# Patient Record
Sex: FEMALE | Race: WHITE | URBAN - METROPOLITAN AREA
[De-identification: names, ages, dates, MRNs, and addresses within clinical notes are randomized per-mention and may not be internally consistent; named-entity substitution may affect disease eponyms.]

---

## 2017-10-01 ENCOUNTER — INPATIENT (INPATIENT)
Facility: HOSPITAL | Age: 82
LOS: 1 days | Discharge: HOME HEALTH | DRG: 641 | End: 2017-10-03
Attending: INTERNAL MEDICINE | Admitting: STUDENT IN AN ORGANIZED HEALTH CARE EDUCATION/TRAINING PROGRAM
Payer: MEDICARE

## 2017-10-01 VITALS
DIASTOLIC BLOOD PRESSURE: 79 MMHG | SYSTOLIC BLOOD PRESSURE: 146 MMHG | RESPIRATION RATE: 16 BRPM | TEMPERATURE: 98 F | OXYGEN SATURATION: 99 % | HEART RATE: 75 BPM

## 2017-10-01 DIAGNOSIS — F03.90 UNSPECIFIED DEMENTIA, UNSPECIFIED SEVERITY, WITHOUT BEHAVIORAL DISTURBANCE, PSYCHOTIC DISTURBANCE, MOOD DISTURBANCE, AND ANXIETY: ICD-10-CM

## 2017-10-01 DIAGNOSIS — F41.0 PANIC DISORDER [EPISODIC PAROXYSMAL ANXIETY]: ICD-10-CM

## 2017-10-01 DIAGNOSIS — N18.3 CHRONIC KIDNEY DISEASE, STAGE 3 (MODERATE): ICD-10-CM

## 2017-10-01 DIAGNOSIS — G89.4 CHRONIC PAIN SYNDROME: ICD-10-CM

## 2017-10-01 DIAGNOSIS — Z29.9 ENCOUNTER FOR PROPHYLACTIC MEASURES, UNSPECIFIED: ICD-10-CM

## 2017-10-01 DIAGNOSIS — M79.673 PAIN IN UNSPECIFIED FOOT: ICD-10-CM

## 2017-10-01 DIAGNOSIS — I10 ESSENTIAL (PRIMARY) HYPERTENSION: ICD-10-CM

## 2017-10-01 DIAGNOSIS — R62.7 ADULT FAILURE TO THRIVE: ICD-10-CM

## 2017-10-01 DIAGNOSIS — E86.0 DEHYDRATION: ICD-10-CM

## 2017-10-01 PROBLEM — Z00.00 ENCOUNTER FOR PREVENTIVE HEALTH EXAMINATION: Status: ACTIVE | Noted: 2017-10-01

## 2017-10-01 LAB
ALBUMIN SERPL ELPH-MCNC: 2.9 G/DL — LOW (ref 3.3–5)
ALP SERPL-CCNC: 267 U/L — HIGH (ref 40–120)
ALT FLD-CCNC: 21 U/L — SIGNIFICANT CHANGE UP (ref 12–78)
ANION GAP SERPL CALC-SCNC: 11 MMOL/L — SIGNIFICANT CHANGE UP (ref 5–17)
APPEARANCE UR: CLEAR — SIGNIFICANT CHANGE UP
AST SERPL-CCNC: 28 U/L — SIGNIFICANT CHANGE UP (ref 15–37)
BACTERIA # UR AUTO: ABNORMAL
BILIRUB SERPL-MCNC: 0.5 MG/DL — SIGNIFICANT CHANGE UP (ref 0.2–1.2)
BILIRUB UR-MCNC: NEGATIVE — SIGNIFICANT CHANGE UP
BUN SERPL-MCNC: 30 MG/DL — HIGH (ref 7–23)
CALCIUM SERPL-MCNC: 8.6 MG/DL — SIGNIFICANT CHANGE UP (ref 8.5–10.1)
CHLORIDE SERPL-SCNC: 107 MMOL/L — SIGNIFICANT CHANGE UP (ref 96–108)
CO2 SERPL-SCNC: 22 MMOL/L — SIGNIFICANT CHANGE UP (ref 22–31)
COLOR SPEC: YELLOW — SIGNIFICANT CHANGE UP
CREAT SERPL-MCNC: 1.1 MG/DL — SIGNIFICANT CHANGE UP (ref 0.5–1.3)
DIFF PNL FLD: ABNORMAL
EOSINOPHIL NFR BLD AUTO: 8 % — HIGH (ref 0–6)
EPI CELLS # UR: ABNORMAL
GLUCOSE SERPL-MCNC: 138 MG/DL — HIGH (ref 70–99)
GLUCOSE UR QL: NEGATIVE — SIGNIFICANT CHANGE UP
HCT VFR BLD CALC: 40.2 % — SIGNIFICANT CHANGE UP (ref 34.5–45)
HGB BLD-MCNC: 11.7 G/DL — SIGNIFICANT CHANGE UP (ref 11.5–15.5)
KETONES UR-MCNC: NEGATIVE — SIGNIFICANT CHANGE UP
LEUKOCYTE ESTERASE UR-ACNC: ABNORMAL
LYMPHOCYTES # BLD AUTO: 32 % — SIGNIFICANT CHANGE UP (ref 13–44)
MCHC RBC-ENTMCNC: 29 GM/DL — LOW (ref 32–36)
MCHC RBC-ENTMCNC: 30.4 PG — SIGNIFICANT CHANGE UP (ref 27–34)
MCV RBC AUTO: 105 FL — HIGH (ref 80–100)
MONOCYTES NFR BLD AUTO: 20 % — HIGH (ref 1–9)
NEUTROPHILS NFR BLD AUTO: 40 % — LOW (ref 43–77)
NITRITE UR-MCNC: NEGATIVE — SIGNIFICANT CHANGE UP
PH UR: 6 — SIGNIFICANT CHANGE UP (ref 5–8)
PLAT MORPH BLD: NORMAL — SIGNIFICANT CHANGE UP
PLATELET # BLD AUTO: 212 K/UL — SIGNIFICANT CHANGE UP (ref 150–400)
POTASSIUM SERPL-MCNC: 3.8 MMOL/L — SIGNIFICANT CHANGE UP (ref 3.5–5.3)
POTASSIUM SERPL-SCNC: 3.8 MMOL/L — SIGNIFICANT CHANGE UP (ref 3.5–5.3)
PROT SERPL-MCNC: 6.7 G/DL — SIGNIFICANT CHANGE UP (ref 6–8.3)
PROT UR-MCNC: 25 MG/DL
RBC # BLD: 3.83 M/UL — SIGNIFICANT CHANGE UP (ref 3.8–5.2)
RBC # FLD: 13.7 % — SIGNIFICANT CHANGE UP (ref 10.3–14.5)
RBC BLD AUTO: SIGNIFICANT CHANGE UP
RBC CASTS # UR COMP ASSIST: ABNORMAL /HPF (ref 0–4)
SODIUM SERPL-SCNC: 140 MMOL/L — SIGNIFICANT CHANGE UP (ref 135–145)
SP GR SPEC: 1.02 — SIGNIFICANT CHANGE UP (ref 1.01–1.02)
UROBILINOGEN FLD QL: NEGATIVE — SIGNIFICANT CHANGE UP
WBC # BLD: 4.5 K/UL — SIGNIFICANT CHANGE UP (ref 3.8–10.5)
WBC # FLD AUTO: 4.5 K/UL — SIGNIFICANT CHANGE UP (ref 3.8–10.5)
WBC UR QL: SIGNIFICANT CHANGE UP

## 2017-10-01 PROCEDURE — 93970 EXTREMITY STUDY: CPT | Mod: 26

## 2017-10-01 PROCEDURE — 70450 CT HEAD/BRAIN W/O DYE: CPT | Mod: 26

## 2017-10-01 PROCEDURE — 93010 ELECTROCARDIOGRAM REPORT: CPT

## 2017-10-01 PROCEDURE — 73630 X-RAY EXAM OF FOOT: CPT | Mod: 26,LT

## 2017-10-01 PROCEDURE — 99223 1ST HOSP IP/OBS HIGH 75: CPT | Mod: AI

## 2017-10-01 PROCEDURE — 71010: CPT | Mod: 26

## 2017-10-01 PROCEDURE — 73610 X-RAY EXAM OF ANKLE: CPT | Mod: 26,LT

## 2017-10-01 PROCEDURE — 99285 EMERGENCY DEPT VISIT HI MDM: CPT

## 2017-10-01 PROCEDURE — 99497 ADVNCD CARE PLAN 30 MIN: CPT | Mod: 25

## 2017-10-01 RX ORDER — SODIUM CHLORIDE 9 MG/ML
3 INJECTION INTRAMUSCULAR; INTRAVENOUS; SUBCUTANEOUS ONCE
Qty: 0 | Refills: 0 | Status: COMPLETED | OUTPATIENT
Start: 2017-10-01 | End: 2017-10-01

## 2017-10-01 RX ORDER — SODIUM CHLORIDE 9 MG/ML
1000 INJECTION INTRAMUSCULAR; INTRAVENOUS; SUBCUTANEOUS ONCE
Qty: 0 | Refills: 0 | Status: COMPLETED | OUTPATIENT
Start: 2017-10-01 | End: 2017-10-01

## 2017-10-01 RX ORDER — LOSARTAN POTASSIUM 100 MG/1
100 TABLET, FILM COATED ORAL DAILY
Qty: 0 | Refills: 0 | Status: DISCONTINUED | OUTPATIENT
Start: 2017-10-01 | End: 2017-10-03

## 2017-10-01 RX ORDER — METOPROLOL TARTRATE 50 MG
25 TABLET ORAL
Qty: 0 | Refills: 0 | Status: DISCONTINUED | OUTPATIENT
Start: 2017-10-01 | End: 2017-10-03

## 2017-10-01 RX ORDER — OXYCODONE AND ACETAMINOPHEN 5; 325 MG/1; MG/1
1 TABLET ORAL EVERY 6 HOURS
Qty: 0 | Refills: 0 | Status: DISCONTINUED | OUTPATIENT
Start: 2017-10-01 | End: 2017-10-01

## 2017-10-01 RX ORDER — INFLUENZA VIRUS VACCINE 15; 15; 15; 15 UG/.5ML; UG/.5ML; UG/.5ML; UG/.5ML
0.5 SUSPENSION INTRAMUSCULAR ONCE
Qty: 0 | Refills: 0 | Status: DISCONTINUED | OUTPATIENT
Start: 2017-10-01 | End: 2017-10-03

## 2017-10-01 RX ORDER — NIFEDIPINE 30 MG
10 TABLET, EXTENDED RELEASE 24 HR ORAL
Qty: 0 | Refills: 0 | Status: DISCONTINUED | OUTPATIENT
Start: 2017-10-01 | End: 2017-10-02

## 2017-10-01 RX ORDER — PANTOPRAZOLE SODIUM 20 MG/1
0 TABLET, DELAYED RELEASE ORAL
Qty: 0 | Refills: 0 | COMMUNITY

## 2017-10-01 RX ORDER — ONDANSETRON 8 MG/1
4 TABLET, FILM COATED ORAL EVERY 6 HOURS
Qty: 0 | Refills: 0 | Status: DISCONTINUED | OUTPATIENT
Start: 2017-10-01 | End: 2017-10-03

## 2017-10-01 RX ORDER — SODIUM CHLORIDE 9 MG/ML
1000 INJECTION INTRAMUSCULAR; INTRAVENOUS; SUBCUTANEOUS
Qty: 0 | Refills: 0 | Status: DISCONTINUED | OUTPATIENT
Start: 2017-10-01 | End: 2017-10-02

## 2017-10-01 RX ORDER — METOPROLOL TARTRATE 50 MG
0 TABLET ORAL
Qty: 0 | Refills: 0 | COMMUNITY

## 2017-10-01 RX ORDER — NIFEDIPINE 30 MG
0 TABLET, EXTENDED RELEASE 24 HR ORAL
Qty: 0 | Refills: 0 | COMMUNITY

## 2017-10-01 RX ORDER — OXYCODONE AND ACETAMINOPHEN 5; 325 MG/1; MG/1
0.5 TABLET ORAL EVERY 4 HOURS
Qty: 0 | Refills: 0 | Status: DISCONTINUED | OUTPATIENT
Start: 2017-10-01 | End: 2017-10-03

## 2017-10-01 RX ORDER — ALPRAZOLAM 0.25 MG
0.25 TABLET ORAL EVERY 6 HOURS
Qty: 0 | Refills: 0 | Status: DISCONTINUED | OUTPATIENT
Start: 2017-10-01 | End: 2017-10-02

## 2017-10-01 RX ORDER — LOSARTAN POTASSIUM 100 MG/1
0 TABLET, FILM COATED ORAL
Qty: 0 | Refills: 0 | COMMUNITY

## 2017-10-01 RX ORDER — PANTOPRAZOLE SODIUM 20 MG/1
40 TABLET, DELAYED RELEASE ORAL
Qty: 0 | Refills: 0 | Status: DISCONTINUED | OUTPATIENT
Start: 2017-10-01 | End: 2017-10-03

## 2017-10-01 RX ORDER — ACETAMINOPHEN 500 MG
650 TABLET ORAL EVERY 6 HOURS
Qty: 0 | Refills: 0 | Status: DISCONTINUED | OUTPATIENT
Start: 2017-10-01 | End: 2017-10-03

## 2017-10-01 RX ADMIN — Medication 25 MILLIGRAM(S): at 17:59

## 2017-10-01 RX ADMIN — SODIUM CHLORIDE 1000 MILLILITER(S): 9 INJECTION INTRAMUSCULAR; INTRAVENOUS; SUBCUTANEOUS at 17:14

## 2017-10-01 RX ADMIN — SODIUM CHLORIDE 3 MILLILITER(S): 9 INJECTION INTRAMUSCULAR; INTRAVENOUS; SUBCUTANEOUS at 14:16

## 2017-10-01 NOTE — H&P ADULT - NSHPSOCIALHISTORY_GEN_ALL_CORE
never smoker  never drinker  never recreational drug user  cannot perform any ADL's, relies on son for assistance w/ all ADL's  consumes soft diet w/ thin liquids at home

## 2017-10-01 NOTE — H&P ADULT - ASSESSMENT
89yo F w/ advanced dementia, HTN, GERD, chronic pain, anxiety w/ panic attacks presents w/ failure to thrive at home.

## 2017-10-01 NOTE — H&P ADULT - NSHPPHYSICALEXAM_GEN_ALL_CORE
T(C): 36.3 (10-01-17 @ 18:15), Max: 36.6 (10-01-17 @ 12:09)  HR: 86 (10-01-17 @ 18:15) (75 - 96)  BP: 178/74 (10-01-17 @ 18:15) (146/79 - 178/90)  RR: 17 (10-01-17 @ 18:15) (16 - 17)  SpO2: 94% (10-01-17 @ 18:15) (94% - 99%)    GENERAL: patient appears thin, pleasantly demented, unable to answer yes/no questions w/ reliable answers  EYES: sclera clear, no exudates  ENMT: oropharynx clear without erythema, no exudates, moist mucous membranes  NECK: supple, soft, no thyromegaly noted  LUNGS: good air entry bilaterally, clear to auscultation, symmetric breath sounds, no wheezing or rhonchi appreciated  HEART: distant heart sounds, soft S1/S2, regular rate  GASTROINTESTINAL: abdomen is thin and soft soft, nontender, nondistended, normoactive bowel sounds  INTEGUMENT: good skin turgor, no lesions noted, marked ecchymosis of L ankle posterior   MUSCULOSKELETAL: no clubbing or cyanosis, no obvious deformity, good AROM of L ankle without obvious discomfort  NEUROLOGIC: awake, alert, oriented to person only, moving 4 extremities, PERRL  PSYCHIATRIC: unable to assess, appears comfortable  HEME/LYMPH: no palpable supraclavicular nodules

## 2017-10-01 NOTE — H&P ADULT - ATTENDING COMMENTS
Advanced care planning was discussed with the patient and family at the bedside. At this time, they have made the informed decision to elect for do not resuscitate and do not intubate without limitations. I completed the DNR hospital form and placed EMR order.    The admission plan was discussed in detail with the patient and family members at the bedside. Son is HCP. Their questions and concerns were addressed to the best of my ability. They are in agreement with the plan as detailed above. They demonstrated adequate understanding of the counseling which I have provided. Advanced care planning was discussed with the patient and family at the bedside. At this time, they have made the informed decision to elect for do not resuscitate and do not intubate without limitations. I completed the DNR hospital form and placed EMR order. The discussion took 19 minutes.     The admission plan was discussed in detail with the patient and family members at the bedside. Son is HCP. Their questions and concerns were addressed to the best of my ability. They are in agreement with the plan as detailed above. They demonstrated adequate understanding of the counseling which I have provided.

## 2017-10-01 NOTE — H&P ADULT - PROBLEM SELECTOR PLAN 7
discussed sedative adverse effect profile w/ the son  he states that she becomes extremely agitated especially at night and he gives 0.25-0.5mg of xanax w/ good effect  he recommends continuing this regimen

## 2017-10-01 NOTE — H&P ADULT - HISTORY OF PRESENT ILLNESS
89yo F w/ advanced dementia, HTN, GERD, chronic pain, anxiety w/ panic attacks presents w/ failure to thrive at home. The pt's son is elderly and states that he cannot provide the care she requires in the home. She isn't ambulatory. She relies on him for assistance w/ all ADL's. He states that she keeps hurting herself without realizing it. He noted bruising all over her L ankle and he isn't not sure how it happened. She isn't falling because she doesn't get out of bed anymore. Other than L ankle bruises, she has no superficial signs of trauma and is in her usual pleasantly demented state. She is oriented only to person @ baseline. Pt is extremely [place] and severely demented. Unable to answer any questions. No other concerns today. The son requests rehab placement once medically cleared.     In the ED, 1L NS bolus was given    Family Hx: son states that there is no premature CAD, DM2, CVA in immediate family members

## 2017-10-01 NOTE — ED PROVIDER NOTE - PROGRESS NOTE DETAILS
All results were explained to patient and/or family and a copy of all available results given.  case brianna Emanuel

## 2017-10-01 NOTE — H&P ADULT - PROBLEM SELECTOR PLAN 6
discussed sedative effects of opiates w/ the son, he gives 1/2 tab every 3-4 hours at home due to c/o constant pain in knees and lumbar spine  opiates are high risk medications for elderly pt's but he states that she has tolerated this regimen for years and recommends continuing for now

## 2017-10-01 NOTE — ED ADULT NURSE REASSESSMENT NOTE - NS ED NURSE REASSESS COMMENT FT1
called to give report LESLI Corbett didn't know she was getting a pt , has to look pt up in computer and will call me back

## 2017-10-01 NOTE — ED ADULT NURSE NOTE - OBJECTIVE STATEMENT
pt's son states pt has decreased po intake recently. pt having more difficulty ambulating. pt diagnosed with dementia 5 weeks ago. pt with bruising to left foot and to inner thigh on left leg

## 2017-10-01 NOTE — H&P ADULT - PROBLEM SELECTOR PLAN 1
admit medicine  may required placement  gentle IVF admit medicine  may require placement  gentle IVF  check BMP in the AM to trend creatinine  PT eval  SW eval

## 2017-10-02 DIAGNOSIS — Z90.12 ACQUIRED ABSENCE OF LEFT BREAST AND NIPPLE: Chronic | ICD-10-CM

## 2017-10-02 LAB
ANION GAP SERPL CALC-SCNC: 11 MMOL/L — SIGNIFICANT CHANGE UP (ref 5–17)
BUN SERPL-MCNC: 23 MG/DL — SIGNIFICANT CHANGE UP (ref 7–23)
CALCIUM SERPL-MCNC: 8.4 MG/DL — LOW (ref 8.5–10.1)
CHLORIDE SERPL-SCNC: 107 MMOL/L — SIGNIFICANT CHANGE UP (ref 96–108)
CO2 SERPL-SCNC: 22 MMOL/L — SIGNIFICANT CHANGE UP (ref 22–31)
CREAT SERPL-MCNC: 0.84 MG/DL — SIGNIFICANT CHANGE UP (ref 0.5–1.3)
CULTURE RESULTS: SIGNIFICANT CHANGE UP
GLUCOSE SERPL-MCNC: 104 MG/DL — HIGH (ref 70–99)
HCT VFR BLD CALC: 39.6 % — SIGNIFICANT CHANGE UP (ref 34.5–45)
HGB BLD-MCNC: 12.4 G/DL — SIGNIFICANT CHANGE UP (ref 11.5–15.5)
MCHC RBC-ENTMCNC: 31.3 GM/DL — LOW (ref 32–36)
MCHC RBC-ENTMCNC: 31.6 PG — SIGNIFICANT CHANGE UP (ref 27–34)
MCV RBC AUTO: 100.9 FL — HIGH (ref 80–100)
PLATELET # BLD AUTO: 235 K/UL — SIGNIFICANT CHANGE UP (ref 150–400)
POTASSIUM SERPL-MCNC: 3.9 MMOL/L — SIGNIFICANT CHANGE UP (ref 3.5–5.3)
POTASSIUM SERPL-SCNC: 3.9 MMOL/L — SIGNIFICANT CHANGE UP (ref 3.5–5.3)
RBC # BLD: 3.92 M/UL — SIGNIFICANT CHANGE UP (ref 3.8–5.2)
RBC # FLD: 13.7 % — SIGNIFICANT CHANGE UP (ref 10.3–14.5)
SODIUM SERPL-SCNC: 140 MMOL/L — SIGNIFICANT CHANGE UP (ref 135–145)
SPECIMEN SOURCE: SIGNIFICANT CHANGE UP
WBC # BLD: 6 K/UL — SIGNIFICANT CHANGE UP (ref 3.8–10.5)
WBC # FLD AUTO: 6 K/UL — SIGNIFICANT CHANGE UP (ref 3.8–10.5)

## 2017-10-02 PROCEDURE — 99233 SBSQ HOSP IP/OBS HIGH 50: CPT | Mod: GC

## 2017-10-02 RX ORDER — ALPRAZOLAM 0.25 MG
0.5 TABLET ORAL THREE TIMES A DAY
Qty: 0 | Refills: 0 | Status: DISCONTINUED | OUTPATIENT
Start: 2017-10-02 | End: 2017-10-03

## 2017-10-02 RX ADMIN — OXYCODONE AND ACETAMINOPHEN 0.5 TABLET(S): 5; 325 TABLET ORAL at 22:31

## 2017-10-02 RX ADMIN — Medication 25 MILLIGRAM(S): at 17:45

## 2017-10-02 RX ADMIN — Medication 0.25 MILLIGRAM(S): at 11:52

## 2017-10-02 RX ADMIN — Medication 10 MILLIGRAM(S): at 05:18

## 2017-10-02 RX ADMIN — PANTOPRAZOLE SODIUM 40 MILLIGRAM(S): 20 TABLET, DELAYED RELEASE ORAL at 05:18

## 2017-10-02 RX ADMIN — Medication 0.5 MILLIGRAM(S): at 17:45

## 2017-10-02 RX ADMIN — OXYCODONE AND ACETAMINOPHEN 0.5 TABLET(S): 5; 325 TABLET ORAL at 18:45

## 2017-10-02 RX ADMIN — Medication 25 MILLIGRAM(S): at 05:18

## 2017-10-02 RX ADMIN — Medication 0.5 MILLIGRAM(S): at 23:54

## 2017-10-02 RX ADMIN — LOSARTAN POTASSIUM 100 MILLIGRAM(S): 100 TABLET, FILM COATED ORAL at 05:18

## 2017-10-02 RX ADMIN — OXYCODONE AND ACETAMINOPHEN 0.5 TABLET(S): 5; 325 TABLET ORAL at 17:45

## 2017-10-02 NOTE — PROGRESS NOTE ADULT - PROBLEM SELECTOR PLAN 1
Does not appear dry on exam, no evidence of fluid overload, pt has taking out the IV   - son requesting placement  - continue gentle IVF   - cr wnl, will continue to trend   - f/u PT eval  - f/u Social work eval Does not appear dry on exam, no evidence of fluid overload. Pulling out IV.   - son requesting placement  - continue gentle IVF   - Cr wnl, will continue to trend   - f/u PT eval  - f/u Social work eval

## 2017-10-02 NOTE — PROGRESS NOTE ADULT - PROBLEM SELECTOR PLAN 2
Advanced, chronic - pt at baseline Advanced, chronic. pt at baseline. Advanced dementia, consider psych to help with controlling patient's behavioural aspects of the dementia

## 2017-10-02 NOTE — PROGRESS NOTE ADULT - ATTENDING COMMENTS
Pt seen + examined. Case discussed with intern/resident. Agree with assessment and plan above (edited) with following addendum:  89yo F w/ advanced dementia, HTN, GERD, chronic pain, anxiety w/ panic attacks presents w/ failure to thrive at home. Admitted for dehydration.  -it seems pt with worsening dementia and behavioral disturbance making it more difficult for son to manage her at home  -work with social work for safest discharge plan -- [facility] vs SNF

## 2017-10-02 NOTE — PROGRESS NOTE ADULT - PROBLEM SELECTOR PLAN 6
Pt severely demented at baseline, unable to ascertain precise pain scale - pain management, percocet prn Severely demented at baseline, unable to ascertain precise pain scale.   - pain management with percocet prn

## 2017-10-02 NOTE — PROGRESS NOTE ADULT - PROBLEM SELECTOR PROBLEM 2
Dementia without behavioral disturbance, unspecified dementia type Dementia with behavioral disturbance

## 2017-10-02 NOTE — GOALS OF CARE CONVERSATION - PERSONAL ADVANCE DIRECTIVE - CONVERSATION DETAILS
met pt son, completed hospital dnr dni consent: further molst discussion: son agrees to dnr dni no TF, wants to continue treatment plan for pt. Dr Gaines aware, to complete molst form  contact # given

## 2017-10-02 NOTE — PROGRESS NOTE ADULT - PROBLEM SELECTOR PLAN 4
BP labile - continue Cozaar, metoprolol, procardia with hold parameters  - consider better optimization of bp  - monitor vitals Chronic, labile.   - continue Cozaar, metoprolol, procardia with hold parameters  - consider better optimization of BP

## 2017-10-02 NOTE — PROGRESS NOTE ADULT - SUBJECTIVE AND OBJECTIVE BOX
INTERVAL HPI/OVERNIGHT EVENTS: 89yo F w/ advanced dementia, HTN, GERD, chronic pain, anxiety w/ panic attacks presents w/ failure to thrive at home. The pt's son is elderly and states that he cannot provide the care she requires in the home. She isn't ambulatory. She relies on him for assistance w/ all ADL's. He states that she keeps hurting herself without realizing it. He noted bruising all over her L ankle and he isn't not sure how it happened. She isn't falling because she doesn't get out of bed anymore. Other than L ankle bruises, she has no superficial signs of trauma and is in her usual pleasantly demented state. She is oriented only to person @ baseline. Pt is extremely [place] and severely demented. Unable to answer any questions. No other concerns today. The son requests rehab placement once medically cleared.     Pt seen and examined at bedside.  Pt is combative and uncooperative. Advanced dementia at baseline, does not respond to questions but to painful stimuli.  Per nurse, pt has been ripping out her IV.    MEDICATIONS  (STANDING):  sodium chloride 0.9%. 1000 milliLiter(s) (125 mL/Hr) IV Continuous <Continuous>  losartan 100 milliGRAM(s) Oral daily  metoprolol 25 milliGRAM(s) Oral two times a day  NIFEdipine IR 10 milliGRAM(s) Oral two times a day  pantoprazole    Tablet 40 milliGRAM(s) Oral before breakfast  influenza   Vaccine 0.5 milliLiter(s) IntraMuscular once    MEDICATIONS  (PRN):  acetaminophen   Tablet. 650 milliGRAM(s) Oral every 6 hours PRN Mild Pain (1 - 3)  ondansetron Injectable 4 milliGRAM(s) IV Push every 6 hours PRN Nausea  oxyCODONE    5 mG/acetaminophen 325 mG 0.5 Tablet(s) Oral every 4 hours PRN Moderate Pain (4 - 6)  ALPRAZolam 0.25 milliGRAM(s) Oral every 6 hours PRN agitation      Allergies    No Known Allergies    Intolerances    ROS: unable to obtain due to severe dementia      Vital Signs Last 24 Hrs  T(C): 36.4 (02 Oct 2017 04:42), Max: 36.6 (01 Oct 2017 12:09)  T(F): 97.6 (02 Oct 2017 04:42), Max: 97.8 (01 Oct 2017 12:09)  HR: 98 (02 Oct 2017 04:42) (75 - 98)  BP: 164/84 (02 Oct 2017 04:42) (124/69 - 178/90)  BP(mean): --  RR: 16 (02 Oct 2017 04:42) (16 - 17)  SpO2: 98% (02 Oct 2017 04:42) (94% - 99%)    10-01 @ 07:01  -  10-02 @ 07:00  --------------------------------------------------------  IN: 1100 mL / OUT: 300 mL / NET: 800 mL      Physical Exam:  General: Frail, severe dementia, combative, does not respond to commands and questions  HEENT: NCAT, PERRLA, EOMI bl, dry mucous membranes   Neck: Supple, nontender, no mass  Neurology: A&Ox1, nonfocal  Respiratory: CTA B/L, No W/R/R  CV: RRR, +S1/S2, no murmurs, rubs or gallops  Abdominal: Soft, NT, ND, hypoactive bowel sounds  Extremities: No C/C/E, +2 peripheral pulses  MSK: no joint erythema or warmth, no joint swelling   Skin: warm, dry, normal color, minor bruise on left lower back and left ankle       LABS:                        12.4   6.0   )-----------( 235      ( 02 Oct 2017 06:35 )             39.6     10    140  |  107  |  23  ----------------------------<  104<H>  3.9   |  22  |  0.84    Ca    8.4<L>      02 Oct 2017 06:35    TPro  6.7  /  Alb  2.9<L>  /  TBili  0.5  /  DBili  x   /  AST  28  /  ALT  21  /  AlkPhos  267<H>  10-01      Urinalysis Basic - ( 01 Oct 2017 15:13 )    Color: Yellow / Appearance: Clear / S.020 / pH: x  Gluc: x / Ketone: Negative  / Bili: Negative / Urobili: Negative   Blood: x / Protein: 25 mg/dL / Nitrite: Negative   Leuk Esterase: Trace / RBC: 3-5 /HPF / WBC 3-5   Sq Epi: x / Non Sq Epi: Moderate / Bacteria: x        RADIOLOGY & ADDITIONAL TESTS: Patient is a 89y old  Female who presents with a chief complaint of dementia "dehydration" as per son (01 Oct 2017 18:17)    INTERVAL HPI/OVERNIGHT EVENTS: 89yo F w/ advanced dementia, HTN, GERD, chronic pain, anxiety w/ panic attacks presents w/ failure to thrive at home. The pt's son is elderly and states that he cannot provide the care she requires in the home. She isn't ambulatory. She relies on him for assistance w/ all ADL's. He states that she keeps hurting herself without realizing it. He noted bruising all over her L ankle and he isn't not sure how it happened. She isn't falling because she doesn't get out of bed anymore. Other than L ankle bruises, she has no superficial signs of trauma and is in her usual pleasantly demented state. She is oriented only to person @ baseline. Pt is extremely [place] and severely demented. Unable to answer any questions. No other concerns today. The son requests rehab placement once medically cleared.     Pt seen and examined at bedside.  Pt is combative and uncooperative. Advanced dementia at baseline, does not respond to questions but to painful stimuli.  Per nurse, pt has been ripping out her IV.    MEDICATIONS  (STANDING):  sodium chloride 0.9%. 1000 milliLiter(s) (125 mL/Hr) IV Continuous <Continuous>  losartan 100 milliGRAM(s) Oral daily  metoprolol 25 milliGRAM(s) Oral two times a day  NIFEdipine IR 10 milliGRAM(s) Oral two times a day  pantoprazole    Tablet 40 milliGRAM(s) Oral before breakfast  influenza   Vaccine 0.5 milliLiter(s) IntraMuscular once    MEDICATIONS  (PRN):  acetaminophen   Tablet. 650 milliGRAM(s) Oral every 6 hours PRN Mild Pain (1 - 3)  ondansetron Injectable 4 milliGRAM(s) IV Push every 6 hours PRN Nausea  oxyCODONE    5 mG/acetaminophen 325 mG 0.5 Tablet(s) Oral every 4 hours PRN Moderate Pain (4 - 6)  ALPRAZolam 0.25 milliGRAM(s) Oral every 6 hours PRN agitation      Allergies    No Known Allergies    Intolerances    ROS: unable to obtain due to severe dementia      Vital Signs Last 24 Hrs  T(C): 36.4 (02 Oct 2017 04:42), Max: 36.6 (01 Oct 2017 12:09)  T(F): 97.6 (02 Oct 2017 04:42), Max: 97.8 (01 Oct 2017 12:09)  HR: 98 (02 Oct 2017 04:42) (75 - 98)  BP: 164/84 (02 Oct 2017 04:42) (124/69 - 178/90)  BP(mean): --  RR: 16 (02 Oct 2017 04:42) (16 - 17)  SpO2: 98% (02 Oct 2017 04:42) (94% - 99%)    10-01 @ 07:01  -  10-02 @ 07:00  --------------------------------------------------------  IN: 1100 mL / OUT: 300 mL / NET: 800 mL      Physical Exam:  General: Frail, severe dementia, combative, does not respond to commands and questions  HEENT: NCAT, PERRLA, EOMI bl, dry mucous membranes   Neck: Supple, nontender, no mass  Neurology: A&Ox1, nonfocal  Respiratory: CTA B/L, No W/R/R  CV: RRR, +S1/S2, no murmurs, rubs or gallops  Abdominal: Soft, NT, ND, hypoactive bowel sounds  Extremities: No C/C/E, +2 peripheral pulses  MSK: no joint erythema or warmth, no joint swelling   Skin: warm, dry, normal color, minor bruise on left lower back and left ankle       LABS:                        12.4   6.0   )-----------( 235      ( 02 Oct 2017 06:35 )             39.6     10    140  |  107  |  23  ----------------------------<  104<H>  3.9   |  22  |  0.84    Ca    8.4<L>      02 Oct 2017 06:35    TPro  6.7  /  Alb  2.9<L>  /  TBili  0.5  /  DBili  x   /  AST  28  /  ALT  21  /  AlkPhos  267<H>  10      Urinalysis Basic - ( 01 Oct 2017 15:13 )    Color: Yellow / Appearance: Clear / S.020 / pH: x  Gluc: x / Ketone: Negative  / Bili: Negative / Urobili: Negative   Blood: x / Protein: 25 mg/dL / Nitrite: Negative   Leuk Esterase: Trace / RBC: 3-5 /HPF / WBC 3-5   Sq Epi: x / Non Sq Epi: Moderate / Bacteria: x        RADIOLOGY & ADDITIONAL TESTS:

## 2017-10-02 NOTE — PROGRESS NOTE ADULT - PROBLEM SELECTOR PLAN 3
likely stage 3-4 @ baseline  - GFR improving, cr wnl  - continue to trend  - continue ivf likely stage 3-4 @ baseline  - continue gentle hydration   - GFR improving, Cr wnl  - continue to trend

## 2017-10-02 NOTE — PROGRESS NOTE ADULT - PROBLEM SELECTOR PLAN 8
GI ppx w/ home dose protonix 40mg   DVT ppx w/ ICD's due to unknown case of bruising, potentially ?falls DVT ppx with ICD's due to unknown case of bruising, potentially ?falls  GI ppx with protonix 40mg

## 2017-10-03 ENCOUNTER — TRANSCRIPTION ENCOUNTER (OUTPATIENT)
Age: 82
End: 2017-10-03

## 2017-10-03 VITALS
RESPIRATION RATE: 16 BRPM | HEART RATE: 90 BPM | DIASTOLIC BLOOD PRESSURE: 69 MMHG | SYSTOLIC BLOOD PRESSURE: 139 MMHG | TEMPERATURE: 98 F | OXYGEN SATURATION: 95 %

## 2017-10-03 DIAGNOSIS — G30.8 OTHER ALZHEIMER'S DISEASE: ICD-10-CM

## 2017-10-03 DIAGNOSIS — F32.9 MAJOR DEPRESSIVE DISORDER, SINGLE EPISODE, UNSPECIFIED: ICD-10-CM

## 2017-10-03 DIAGNOSIS — F03.91 UNSPECIFIED DEMENTIA WITH BEHAVIORAL DISTURBANCE: ICD-10-CM

## 2017-10-03 LAB
ANION GAP SERPL CALC-SCNC: 10 MMOL/L — SIGNIFICANT CHANGE UP (ref 5–17)
BUN SERPL-MCNC: 23 MG/DL — SIGNIFICANT CHANGE UP (ref 7–23)
CALCIUM SERPL-MCNC: 9.1 MG/DL — SIGNIFICANT CHANGE UP (ref 8.5–10.1)
CHLORIDE SERPL-SCNC: 104 MMOL/L — SIGNIFICANT CHANGE UP (ref 96–108)
CO2 SERPL-SCNC: 24 MMOL/L — SIGNIFICANT CHANGE UP (ref 22–31)
CREAT SERPL-MCNC: 1.1 MG/DL — SIGNIFICANT CHANGE UP (ref 0.5–1.3)
GLUCOSE SERPL-MCNC: 81 MG/DL — SIGNIFICANT CHANGE UP (ref 70–99)
HCT VFR BLD CALC: 40.6 % — SIGNIFICANT CHANGE UP (ref 34.5–45)
HGB BLD-MCNC: 12.1 G/DL — SIGNIFICANT CHANGE UP (ref 11.5–15.5)
MCHC RBC-ENTMCNC: 29.7 GM/DL — LOW (ref 32–36)
MCHC RBC-ENTMCNC: 31.1 PG — SIGNIFICANT CHANGE UP (ref 27–34)
MCV RBC AUTO: 104.7 FL — HIGH (ref 80–100)
PLATELET # BLD AUTO: 237 K/UL — SIGNIFICANT CHANGE UP (ref 150–400)
POTASSIUM SERPL-MCNC: 4.2 MMOL/L — SIGNIFICANT CHANGE UP (ref 3.5–5.3)
POTASSIUM SERPL-SCNC: 4.2 MMOL/L — SIGNIFICANT CHANGE UP (ref 3.5–5.3)
RBC # BLD: 3.88 M/UL — SIGNIFICANT CHANGE UP (ref 3.8–5.2)
RBC # FLD: 14.2 % — SIGNIFICANT CHANGE UP (ref 10.3–14.5)
SODIUM SERPL-SCNC: 138 MMOL/L — SIGNIFICANT CHANGE UP (ref 135–145)
WBC # BLD: 7 K/UL — SIGNIFICANT CHANGE UP (ref 3.8–10.5)
WBC # FLD AUTO: 7 K/UL — SIGNIFICANT CHANGE UP (ref 3.8–10.5)

## 2017-10-03 PROCEDURE — 99239 HOSP IP/OBS DSCHRG MGMT >30: CPT

## 2017-10-03 RX ORDER — NIFEDIPINE 30 MG
0 TABLET, EXTENDED RELEASE 24 HR ORAL
Qty: 0 | Refills: 0 | COMMUNITY

## 2017-10-03 RX ORDER — ALPRAZOLAM 0.25 MG
1 TABLET ORAL
Qty: 0 | Refills: 0 | COMMUNITY

## 2017-10-03 RX ORDER — ALPRAZOLAM 0.25 MG
1 TABLET ORAL
Qty: 0 | Refills: 0 | COMMUNITY
Start: 2017-10-03

## 2017-10-03 RX ADMIN — LOSARTAN POTASSIUM 100 MILLIGRAM(S): 100 TABLET, FILM COATED ORAL at 05:42

## 2017-10-03 RX ADMIN — PANTOPRAZOLE SODIUM 40 MILLIGRAM(S): 20 TABLET, DELAYED RELEASE ORAL at 05:42

## 2017-10-03 RX ADMIN — OXYCODONE AND ACETAMINOPHEN 0.5 TABLET(S): 5; 325 TABLET ORAL at 00:27

## 2017-10-03 RX ADMIN — Medication 25 MILLIGRAM(S): at 05:42

## 2017-10-03 NOTE — PROGRESS NOTE ADULT - PROBLEM SELECTOR PLAN 1
Does not appear dry on exam, no evidence of fluid overload. Off IVF  - encourage PO intake  - Cr wnl, will continue to trend   - per PT, bed mobility training; transfer training; gait training on placement  - f/u Social work eval, [facility] vs SNF Does not appear dry on exam, no evidence of fluid overload. Off IVF  - encourage PO fluid intake  - f/u Social work eval, [facility] vs SNF  - PT recommends home PT

## 2017-10-03 NOTE — DISCHARGE NOTE ADULT - MEDICATION SUMMARY - MEDICATIONS TO STOP TAKING
I will STOP taking the medications listed below when I get home from the hospital:  None I will STOP taking the medications listed below when I get home from the hospital:    Procardia 10 mg oral capsule  -- orally 2 times a day

## 2017-10-03 NOTE — PROGRESS NOTE ADULT - ASSESSMENT
89yo F w/ advanced dementia, HTN, GERD, chronic pain, anxiety w/ panic attacks presents w/ failure to thrive at home. Admitted for dehydration.
89yo F w/ advanced dementia, HTN, GERD, chronic pain, anxiety w/ panic attacks presents w/ failure to thrive at home admitted for dehydration and FTT at home.

## 2017-10-03 NOTE — DISCHARGE NOTE ADULT - CARE PLAN
Principal Discharge DX:	Dehydration  Goal:	Resolution  Instructions for follow-up, activity and diet:	Resolved. Encourage fluid intake.  Secondary Diagnosis:	Alzheimer's dementia with behavioral disturbance, unspecified timing of dementia onset  Instructions for follow-up, activity and diet:	Continue taking  Secondary Diagnosis:	Essential hypertension  Instructions for follow-up, activity and diet:	Continue taking cozaar and metoprolol  Secondary Diagnosis:	CKD (chronic kidney disease) stage 3, GFR 30-59 ml/min  Secondary Diagnosis:	Chronic pain syndrome  Instructions for follow-up, activity and diet:	Continue taking vicodin.  Secondary Diagnosis:	Pain of foot, unspecified laterality  Instructions for follow-up, activity and diet:	X rays negative for acute fractures.   Continue to monitor for changes. Principal Discharge DX:	Dehydration  Goal:	Resolution  Instructions for follow-up, activity and diet:	Resolved. Encourage fluid intake.  Secondary Diagnosis:	Alzheimer's dementia with behavioral disturbance, unspecified timing of dementia onset  Secondary Diagnosis:	Essential hypertension  Goal:	BP control  Instructions for follow-up, activity and diet:	Continue taking cozaar, metoprolol, procardia  Secondary Diagnosis:	CKD (chronic kidney disease) stage 3, GFR 30-59 ml/min  Secondary Diagnosis:	Chronic pain syndrome  Goal:	Pain control  Instructions for follow-up, activity and diet:	Continue taking home meds, Vicodin  Secondary Diagnosis:	Pain of foot, unspecified laterality  Instructions for follow-up, activity and diet:	X rays negative for acute fractures.   Continue to monitor for changes.  Secondary Diagnosis:	Panic anxiety syndrome  Goal:	Improving  Instructions for follow-up, activity and diet:	Continue xanax Principal Discharge DX:	Dehydration  Goal:	Resolution  Instructions for follow-up, activity and diet:	Resolved. Encourage fluid intake.  Secondary Diagnosis:	Alzheimer's dementia with behavioral disturbance, unspecified timing of dementia onset  Instructions for follow-up, activity and diet:	Continue taking parnate and xanax.  Secondary Diagnosis:	Essential hypertension  Goal:	BP control  Instructions for follow-up, activity and diet:	Stop taking procardia. Continue taking cozaar, metoprolol.  Secondary Diagnosis:	CKD (chronic kidney disease) stage 3, GFR 30-59 ml/min  Instructions for follow-up, activity and diet:	Encourage fluid intake.  Secondary Diagnosis:	Chronic pain syndrome  Goal:	Pain control  Instructions for follow-up, activity and diet:	Continue taking Vicodin.  Secondary Diagnosis:	Pain of foot, unspecified laterality  Instructions for follow-up, activity and diet:	X rays negative for acute fractures.   Continue to monitor for changes.  Secondary Diagnosis:	Panic anxiety syndrome  Goal:	Improving  Instructions for follow-up, activity and diet:	Continue taking xanax. Principal Discharge DX:	Dehydration  Goal:	Resolution  Instructions for follow-up, activity and diet:	Resolved. Encourage fluid intake by mouth.  Secondary Diagnosis:	Alzheimer's dementia with behavioral disturbance, unspecified timing of dementia onset  Instructions for follow-up, activity and diet:	Continue taking parnate and xanax. Follow up with PMD / psychiatrist.  Secondary Diagnosis:	Essential hypertension  Instructions for follow-up, activity and diet:	Stop taking procardia (this has been shown to cause significant drop in blood pressure for some patients). Continue taking cozaar, metoprolol. Monitor the blood pressure and make a daily log of blood pressure readings to show to PMD, to see if blood pressure adequately controlled without the procardia.  Secondary Diagnosis:	CKD (chronic kidney disease) stage 3, GFR 30-59 ml/min  Instructions for follow-up, activity and diet:	Encourage fluid intake. Follow up with PMD.  Secondary Diagnosis:	Chronic pain syndrome  Goal:	Pain control  Instructions for follow-up, activity and diet:	Continue taking Vicodin.  Secondary Diagnosis:	Pain of foot, unspecified laterality  Instructions for follow-up, activity and diet:	X rays negative for acute fractures.   Continue to monitor for changes.  Secondary Diagnosis:	Panic anxiety syndrome  Instructions for follow-up, activity and diet:	Continue taking xanax. Principal Discharge DX:	Dehydration  Goal:	Resolution  Instructions for follow-up, activity and diet:	Resolved. Encourage fluid intake by mouth.  Secondary Diagnosis:	Alzheimer's dementia with behavioral disturbance, unspecified timing of dementia onset  Instructions for follow-up, activity and diet:	Continue taking parnate and xanax. Follow up with PMD / psychiatrist.  Secondary Diagnosis:	Essential hypertension  Instructions for follow-up, activity and diet:	Stop taking procardia (this has been shown to cause significant drop in blood pressure for some patients). Continue taking cozaar, metoprolol. Monitor the blood pressure and make a daily log of blood pressure readings to show to PMD, to see if blood pressure adequately controlled without the procardia.  Secondary Diagnosis:	CKD (chronic kidney disease) stage 3, GFR 30-59 ml/min  Instructions for follow-up, activity and diet:	Encourage fluid intake. Follow up with PMD.  Secondary Diagnosis:	Chronic pain syndrome  Goal:	Pain control  Instructions for follow-up, activity and diet:	Continue taking Vicodin.  Secondary Diagnosis:	Pain of foot, unspecified laterality  Instructions for follow-up, activity and diet:	X rays negative for acute fractures.   You may use Tylenol for pain control.  Secondary Diagnosis:	Panic anxiety syndrome  Instructions for follow-up, activity and diet:	Continue taking xanax and follow up with your PMD / psychiatrist.

## 2017-10-03 NOTE — PROGRESS NOTE ADULT - SUBJECTIVE AND OBJECTIVE BOX
Patient is a 89y old  Female who presents with a chief complaint of dementia "dehydration" as per son (01 Oct 2017 18:17)    INTERVAL HPI/OVERNIGHT EVENTS: 89yo F w/ advanced dementia, HTN, GERD, chronic pain, anxiety w/ panic attacks presents w/ failure to thrive at home. The pt's son is elderly and states that he cannot provide the care she requires in the home. She isn't ambulatory. She relies on him for assistance w/ all ADL's. He states that she keeps hurting herself without realizing it. He noted bruising all over her L ankle and he isn't not sure how it happened. She isn't falling because she doesn't get out of bed anymore. Other than L ankle bruises, she has no superficial signs of trauma and is in her usual pleasantly demented state. She is oriented only to person @ baseline. Pt is extremely [place] and severely demented. Unable to answer any questions. No other concerns today. The son requests rehab placement once medically cleared.     Pt seen and examined at bedside sleeping comfortably.  Remains uncooperative with exam and does not respond to questions properly likely due to advanced dementia at baseline.  Improved from yesterday.    MEDICATIONS  (STANDING):  influenza   Vaccine 0.5 milliLiter(s) IntraMuscular once  losartan 100 milliGRAM(s) Oral daily  metoprolol 25 milliGRAM(s) Oral two times a day  pantoprazole    Tablet 40 milliGRAM(s) Oral before breakfast    MEDICATIONS  (PRN):  acetaminophen   Tablet. 650 milliGRAM(s) Oral every 6 hours PRN Mild Pain (1 - 3)  ALPRAZolam 0.5 milliGRAM(s) Oral three times a day PRN Aniexty/insomia  ondansetron Injectable 4 milliGRAM(s) IV Push every 6 hours PRN Nausea  oxyCODONE    5 mG/acetaminophen 325 mG 0.5 Tablet(s) Oral every 4 hours PRN Moderate Pain (4 - 6)      Allergies    No Known Allergies    Intolerances      ROS: unable to obtain due to severe dementia    Vital Signs Last 24 Hrs  T(C): 36.6 (03 Oct 2017 06:43), Max: 36.9 (02 Oct 2017 12:50)  T(F): 97.8 (03 Oct 2017 06:43), Max: 98.4 (02 Oct 2017 12:50)  HR: 90 (03 Oct 2017 06:43) (88 - 91)  BP: 139/69 (03 Oct 2017 06:43) (134/73 - 151/68)  BP(mean): --  RR: 16 (03 Oct 2017 06:43) (16 - 17)  SpO2: 95% (03 Oct 2017 06:43) (95% - 99%)    10-02 @ 07:01  -  10-03 @ 07:00  --------------------------------------------------------  IN: 250 mL / OUT: 0 mL / NET: 250 mL      Physical Exam:  General: Frail, severe dementia, responds to stimuli and pain  HEENT: NCAT, PERRLA, EOMI bl, dry mucous membranes   Neck: Supple, nontender, no mass  Neurology: A&Ox1, nonfocal  Respiratory: CTA B/L, No W/R/R  CV: RRR, +S1/S2, no murmurs, rubs or gallops  Abdominal: Soft, NT, ND, hypoactive bowel sounds  Extremities: No C/C/E, +2 peripheral pulses  MSK: no joint erythema or warmth, no joint swelling   Skin: warm, dry, normal color, minor bruise on left lower back and left ankle     LABS:                        12.1   7.0   )-----------( 237      ( 03 Oct 2017 06:46 )             40.6     10-03    138  |  104  |  23  ----------------------------<  81  4.2   |  24  |  1.10    Ca    9.1      03 Oct 2017 06:46    TPro  6.7  /  Alb  2.9<L>  /  TBili  0.5  /  DBili  x   /  AST  28  /  ALT  21  /  AlkPhos  267<H>  10-      Urinalysis Basic - ( 01 Oct 2017 15:13 )    Color: Yellow / Appearance: Clear / S.020 / pH: x  Gluc: x / Ketone: Negative  / Bili: Negative / Urobili: Negative   Blood: x / Protein: 25 mg/dL / Nitrite: Negative   Leuk Esterase: Trace / RBC: 3-5 /HPF / WBC 3-5   Sq Epi: x / Non Sq Epi: Moderate / Bacteria: Occasional        RADIOLOGY & ADDITIONAL TESTS: Patient is a 89y old  Female who presents with a chief complaint of dementia "dehydration" as per son (01 Oct 2017 18:17)    INTERVAL HPI/OVERNIGHT EVENTS: 89yo F w/ advanced dementia, HTN, GERD, chronic pain, anxiety w/ panic attacks presents w/ failure to thrive at home. The pt's son is elderly and states that he cannot provide the care she requires in the home. She isn't ambulatory. She relies on him for assistance w/ all ADL's. He states that she keeps hurting herself without realizing it. He noted bruising all over her L ankle and he isn't not sure how it happened. She isn't falling because she doesn't get out of bed anymore. Other than L ankle bruises, she has no superficial signs of trauma and is in her usual pleasantly demented state. She is oriented only to person @ baseline. Pt is extremely [place] and severely demented. Unable to answer any questions. No other concerns today. The son requests rehab placement once medically cleared.     Pt seen and examined at bedside sleeping comfortably.  Remains uncooperative with exam and does not respond to questions properly likely due to advanced dementia at baseline.  Improved from yesterday.    MEDICATIONS  (STANDING):  influenza   Vaccine 0.5 milliLiter(s) IntraMuscular once  losartan 100 milliGRAM(s) Oral daily  metoprolol 25 milliGRAM(s) Oral two times a day  pantoprazole    Tablet 40 milliGRAM(s) Oral before breakfast    MEDICATIONS  (PRN):  acetaminophen   Tablet. 650 milliGRAM(s) Oral every 6 hours PRN Mild Pain (1 - 3)  ALPRAZolam 0.5 milliGRAM(s) Oral three times a day PRN Aniexty/insomia  ondansetron Injectable 4 milliGRAM(s) IV Push every 6 hours PRN Nausea  oxyCODONE    5 mG/acetaminophen 325 mG 0.5 Tablet(s) Oral every 4 hours PRN Moderate Pain (4 - 6)      Allergies    No Known Allergies    Intolerances      ROS: unable to obtain due to severe dementia    Vital Signs Last 24 Hrs  T(C): 36.6 (03 Oct 2017 06:43), Max: 36.9 (02 Oct 2017 12:50)  T(F): 97.8 (03 Oct 2017 06:43), Max: 98.4 (02 Oct 2017 12:50)  HR: 90 (03 Oct 2017 06:43) (88 - 91)  BP: 139/69 (03 Oct 2017 06:43) (134/73 - 151/68)  BP(mean): --  RR: 16 (03 Oct 2017 06:43) (16 - 17)  SpO2: 95% (03 Oct 2017 06:43) (95% - 99%)    10-02 @ 07:01  -  10-03 @ 07:00  --------------------------------------------------------  IN: 250 mL / OUT: 0 mL / NET: 250 mL      Physical Exam:  General: Frail, severe dementia, responds to stimuli and pain  HEENT: NCAT, PERRLA, EOMI bl, dry mucous membranes   Neck: Supple, nontender, no mass  Neurology: A&Ox1, nonfocal  Respiratory: CTA B/L, No W/R/R  CV: RRR, +S1/S2, no murmurs, rubs or gallops  Abdominal: Soft, NT, ND, hypoactive bowel sounds  Extremities: No C/C/E, +2 peripheral pulses  MSK: no joint erythema or warmth, no joint swelling   Skin: warm, dry, normal color, minor bruise on left lower back and left ankle     LABS:                        12.1   7.0   )-----------( 237      ( 03 Oct 2017 06:46 )             40.6     10-03    138  |  104  |  23  ----------------------------<  81  4.2   |  24  |  1.10    Ca    9.1      03 Oct 2017 06:46    TPro  6.7  /  Alb  2.9<L>  /  TBili  0.5  /  DBili  x   /  AST  28  /  ALT  21  /  AlkPhos  267<H>  10-      Urinalysis Basic - ( 01 Oct 2017 15:13 )    Color: Yellow / Appearance: Clear / S.020 / pH: x  Gluc: x / Ketone: Negative  / Bili: Negative / Urobili: Negative   Blood: x / Protein: 25 mg/dL / Nitrite: Negative   Leuk Esterase: Trace / RBC: 3-5 /HPF / WBC 3-5   Sq Epi: x / Non Sq Epi: Moderate / Bacteria: Occasional    Culture - Urine (10.01.17 @ 18:37)    Specimen Source: .Urine Catheterized    Culture Results:   <10,000 CFU/ml  Normal Urogenital elizabeth present      RADIOLOGY & ADDITIONAL TESTS:

## 2017-10-03 NOTE — DISCHARGE NOTE ADULT - HOSPITAL COURSE
89yo F w/ advanced dementia, HTN, GERD, chronic pain, anxiety w/ panic attacks presents w/ failure to thrive at home. The pt's son is elderly and states that he cannot provide the care she requires in the home. She isn't ambulatory. She relies on him for assistance w/ all ADL's. He states that she keeps hurting herself without realizing it. He noted bruising all over her L ankle and he isn't not sure how it happened. She isn't falling because she doesn't get out of bed anymore. Other than L ankle bruises, she has no superficial signs of trauma and is in her usual pleasantly demented state. She is oriented only to person @ baseline. Pt is extremely [place] and severely demented. Unable to answer any questions. No other concerns today. The son requests rehab placement once medically cleared.     Admitted for dehydration and failure to thrive at home.  Pt severely demented at baseline, very agitated throughout hospital course. PT and Psych (Patel) consulted.  Xrays of feet and ankle done on admission due to bruising of unknown cause, negative for any acute pathology or fracture. B/L lower extremity doppler performed due to swelling of unknown etiology negative for DVTs and CT head negative for ?falls.  Pt labs all wnl during hospital course but severely agitated due to half home dose of xanax started on admission in light of patients age. Pt started back on home dose xanax with improvement in agitation and anxiety.  Son requested placement due to inability to take care of pt at home, pt is completely dependent.  Social work evaluation for home health aid or nursing home. Discharge home with home health aid. 89yo F w/ advanced dementia, HTN, GERD, chronic pain, anxiety w/ panic attacks presents w/ failure to thrive at home. The pt's son is elderly and states that he cannot provide the care she requires in the home. She isn't ambulatory. She relies on him for assistance w/ all ADL's. He states that she keeps hurting herself without realizing it. He noted bruising all over her L ankle and he isn't not sure how it happened. She isn't falling because she doesn't get out of bed anymore. Other than L ankle bruises, she has no superficial signs of trauma and is in her usual pleasantly demented state. She is oriented only to person @ baseline. Pt is extremely [place] and severely demented. Unable to answer any questions. No other concerns today. The son requests rehab placement once medically cleared.     Admitted for dehydration and failure to thrive at home.  Pt severely demented at baseline, very agitated throughout hospital course. PT and Psych (Patel) consulted.  Xrays of feet and ankle done on admission due to bruising of unknown cause, negative for any acute pathology or fracture. B/L lower extremity doppler performed due to swelling of unknown etiology negative for DVTs and CT head negative for ?falls.  Pt labs all wnl during hospital course but severely agitated due to half home dose of xanax started on admission in light of patients age. Pt started back on home dose xanax with improvement in agitation and anxiety.  Son requested placement due to inability to take care of pt at home, pt is completely dependent.  Social work evaluation for home health aid or nursing home. Pt medical stable to be discharged home with home health aid. 89yo F w/ advanced dementia, HTN, GERD, chronic pain, anxiety w/ panic attacks presents w/ failure to thrive at home. The pt's son is elderly and states that he cannot provide the care she requires in the home. She isn't ambulatory. She relies on him for assistance w/ all ADL's. He states that she keeps hurting herself without realizing it. He noted bruising all over her L ankle and he isn't not sure how it happened. She isn't falling because she doesn't get out of bed anymore. Other than L ankle bruises, she has no superficial signs of trauma and is in her usual pleasantly demented state. She is oriented only to person @ baseline. Pt is extremely [place] and severely demented. Unable to answer any questions. No other concerns today. The son requests rehab placement once medically cleared. Admitted for dehydration and failure to thrive at home.     During hospital stay, Pt given fluids for dehydration with improvement. Pt severely demented at baseline, very agitated throughout hospital course. Pt started back on home dose xanax with improvement in agitation and anxiety. PT and Psych (Patel) consulted. Xrays of feet and ankle done due to bruising of unknown cause, negative for any acute pathology or fracture. B/L lower extremity doppler performed due to swelling of unknown etiology negative for DVTs.  CT head negative for questionable falls. Social work on case recommended discharge home with health aid. Pt medical stable to be discharged home. HPI:  89yo F w/ advanced dementia, HTN, GERD, chronic pain, anxiety w/ panic attacks presents w/ failure to thrive at home. The pt's son is elderly and states that he cannot provide the care she requires in the home. She isn't ambulatory. She relies on him for assistance w/ all ADL's. He states that she keeps hurting herself without realizing it. He noted bruising all over her L ankle and he isn't not sure how it happened. She isn't falling because she doesn't get out of bed anymore. Other than L ankle bruises, she has no superficial signs of trauma and is in her usual pleasantly demented state. She is oriented only to person @ baseline. Pt is extremely [place] and severely demented. Unable to answer any questions. No other concerns today. The son requests rehab placement once medically cleared. Admitted for dehydration and failure to thrive at home.     Hospital Course:  During hospital stay, pt given fluids for dehydration with improvement. Pt severely demented at baseline, very agitated throughout hospital course. Pt started back on home dose xanax with improvement in agitation and anxiety. PT and Psych (Patel) consulted. Xrays of feet and ankle done due to bruising of unknown cause, negative for any acute pathology or fracture. B/L lower extremity doppler performed due to swelling of unknown etiology negative for DVTs.  CT head negative for questionable falls. Social work on case recommended discharge home with health aid. Pt medical stable to be discharged home.     Called PMD, Dr. Quesada's office and left message regarding discharge.     Time spent: 45min

## 2017-10-03 NOTE — BEHAVIORAL HEALTH ASSESSMENT NOTE - HPI (INCLUDE ILLNESS QUALITY, SEVERITY, DURATION, TIMING, CONTEXT, MODIFYING FACTORS, ASSOCIATED SIGNS AND SYMPTOMS)
89yo F w/ advanced dementia, HTN, GERD, chronic pain, anxiety w/ panic attacks presents w/ failure to thrive at home. The pt's son is elderly and states that he cannot provide the care she requires in the home. She isn't ambulatory. She relies on him for assistance w/ all ADL's. He states that she keeps hurting herself without realizing it. He noted bruising all over her L ankle and he isn't not sure how it happened. She isn't falling because she doesn't get out of bed anymore. Other than L ankle bruises, she has no superficial signs of trauma and is in her usual pleasantly demented state. She is oriented only to person @ baseline. Pt is extremely [place] and severely demented. Unable to answer any questions. No other concerns today. The son requests rehab placement once medically cleared.   Patient is a 89 y/o WWF, with no prior psychiatric hospitalizations, who was brought to the hospital by her son, who is no longer able to care for self. Patient at this time is in control, but she is very hard of hearing and is unable to answer the most basic questions.

## 2017-10-03 NOTE — PROGRESS NOTE ADULT - PROBLEM SELECTOR PLAN 3
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

## 2017-10-03 NOTE — PROGRESS NOTE ADULT - PROBLEM SELECTOR PLAN 4
Chronic, labile - more controlled today  - continue Cozaar, metoprolol with hold parameters Chronic, labile - improved  - d/c procardia  - continue cozaar and metoprolol with hold parameters

## 2017-10-03 NOTE — DISCHARGE NOTE ADULT - MEDICATION SUMMARY - MEDICATIONS TO TAKE
I will START or STAY ON the medications listed below when I get home from the hospital:    Vicodin 5 mg-300 mg oral tablet  -- orally 4 times a day  -- Indication: For Chronic pain syndrome    Cozaar 100 mg oral tablet  -- 1 tab(s) by mouth once a day  -- Indication: For Essential hypertension    Parnate 10 mg oral tablet  -- 1 tab(s) by mouth 3 times a day  -- Indication: For Depressive disorder    ALPRAZolam 0.5 mg oral tablet  -- 1 tab(s) by mouth 3 times a day, As needed, Aniexty/insomia  -- Indication: For Panic anxiety syndrome    metoprolol tartrate 25 mg oral tablet  -- 1 tab(s) by mouth 2 times a day  -- Indication: For HTN (hypertension)    Procardia 10 mg oral capsule  -- orally 2 times a day  -- Indication: For HTN (hypertension)    pantoprazole 40 mg oral granule, enteric coated  -- 1 each by mouth once a day  -- Indication: For GERD I will START or STAY ON the medications listed below when I get home from the hospital:    Vicodin 5 mg-300 mg oral tablet  -- orally 4 times a day  -- Indication: For Chronic pain syndrome    Cozaar 100 mg oral tablet  -- 1 tab(s) by mouth once a day  -- Indication: For Essential hypertension    Parnate 10 mg oral tablet  -- 1 tab(s) by mouth 3 times a day  -- Indication: For Depressive disorder    ALPRAZolam 0.5 mg oral tablet  -- 1 tab(s) by mouth 3 times a day, As needed, Aniexty/insomia  -- Indication: For Panic anxiety syndrome    metoprolol tartrate 25 mg oral tablet  -- 1 tab(s) by mouth 2 times a day  -- Indication: For HTN (hypertension)    pantoprazole 40 mg oral granule, enteric coated  -- 1 each by mouth once a day  -- Indication: For GERD I will START or STAY ON the medications listed below when I get home from the hospital:    Vicodin 5 mg-300 mg oral tablet  -- 1 tab by mouth 4 times a day, As Needed for severe pain  -- Indication: For for severe pain if needed    Cozaar 100 mg oral tablet  -- 1 tab(s) by mouth once a day  -- Indication: For Essential hypertension    Parnate 10 mg oral tablet  -- 1 tab(s) by mouth 3 times a day  -- Indication: For Depressive disorder    ALPRAZolam 0.5 mg oral tablet  -- 1 tab(s) by mouth 3 times a day, As needed, Aniexty/insomia  -- Indication: For Panic anxiety syndrome    metoprolol tartrate 25 mg oral tablet  -- 1 tab(s) by mouth 2 times a day  -- Indication: For HTN (hypertension)    pantoprazole 40 mg oral granule, enteric coated  -- 1 each by mouth once a day  -- Indication: For GERD

## 2017-10-03 NOTE — DISCHARGE NOTE ADULT - SECONDARY DIAGNOSIS.
Alzheimer's dementia with behavioral disturbance, unspecified timing of dementia onset Essential hypertension CKD (chronic kidney disease) stage 3, GFR 30-59 ml/min Chronic pain syndrome Pain of foot, unspecified laterality Panic anxiety syndrome

## 2017-10-03 NOTE — DISCHARGE NOTE ADULT - NS AS ACTIVITY OBS
Do not make important decisions/No Heavy lifting/straining/Do not drive or operate machinery Showering allowed/Bathing allowed/No Heavy lifting/straining/Do not make important decisions/Do not drive or operate machinery

## 2017-10-03 NOTE — DISCHARGE NOTE ADULT - PLAN OF CARE
Resolution Resolved. Encourage fluid intake. Continue taking Continue taking cozaar and metoprolol Continue taking vicodin. X rays negative for acute fractures.   Continue to monitor for changes. BP control Continue taking cozaar, metoprolol, procardia Pain control Continue taking home meds, Vicodin Improving Continue xanax Continue taking parnate and xanax. Stop taking procardia. Continue taking cozaar, metoprolol. Encourage fluid intake. Continue taking Vicodin. Continue taking xanax. Resolved. Encourage fluid intake by mouth. Continue taking parnate and xanax. Follow up with PMD / psychiatrist. Stop taking procardia (this has been shown to cause significant drop in blood pressure for some patients). Continue taking cozaar, metoprolol. Monitor the blood pressure and make a daily log of blood pressure readings to show to PMD, to see if blood pressure adequately controlled without the procardia. Encourage fluid intake. Follow up with PMD. X rays negative for acute fractures.   You may use Tylenol for pain control. Continue taking xanax and follow up with your PMD / psychiatrist.

## 2017-10-03 NOTE — PROGRESS NOTE ADULT - ATTENDING COMMENTS
Pt seen + examined. Case discussed with intern/resident. Agree with assessment and plan above (edited) with following addendum:  89yo F w/ advanced dementia, HTN, GERD, chronic pain, anxiety w/ panic attacks presents w/ failure to thrive at home. Admitted for dehydration.  -it seems pt with worsening dementia and behavioral disturbance making it more difficult for son to manage her at home. psych consulted and recommended continuing home regimen if it successfully calmed her  -work with social work for safest discharge plan -- son chose to take pt home and has hired privately for [facility].   -discharge today

## 2017-10-03 NOTE — PROGRESS NOTE ADULT - PROBLEM SELECTOR PLAN 5
Uncertain cause of bruising in the area, X-ray's negative for fx or concerns  - continue to monitor patient, fall protocols Uncertain cause of bruising in the area, X-ray's negative for fx or concerns  - continue to monitor patient  - fall precautions

## 2017-10-03 NOTE — PROGRESS NOTE ADULT - PROBLEM SELECTOR PLAN 2
Advanced, chronic - pt with worsening dementia and behavioral disturbance making it more difficult for son to manage her at home  - son requesting placement Chronic, advanced. Pt with worsening dementia and behavioral disturbance making it more difficult for son to manage pt at home.   - No dementia meds. Chronic, advanced. Pt with worsening dementia and behavioral disturbance making it more difficult for son to manage pt at home.   - No dementia meds. --- using xanax to keep calm / less anxious

## 2017-10-03 NOTE — DISCHARGE NOTE ADULT - PATIENT PORTAL LINK FT
“You can access the [facility] Patient Portal, offered by [facility], by registering with the following website: [email or web address]”

## 2017-10-06 DIAGNOSIS — N18.3 CHRONIC KIDNEY DISEASE, STAGE 3 (MODERATE): ICD-10-CM

## 2017-10-06 DIAGNOSIS — G89.4 CHRONIC PAIN SYNDROME: ICD-10-CM

## 2017-10-06 DIAGNOSIS — R62.51 FAILURE TO THRIVE (CHILD): ICD-10-CM

## 2017-10-06 DIAGNOSIS — F41.9 ANXIETY DISORDER, UNSPECIFIED: ICD-10-CM

## 2017-10-06 DIAGNOSIS — I12.9 HYPERTENSIVE CHRONIC KIDNEY DISEASE WITH STAGE 1 THROUGH STAGE 4 CHRONIC KIDNEY DISEASE, OR UNSPECIFIED CHRONIC KIDNEY DISEASE: ICD-10-CM

## 2017-10-06 DIAGNOSIS — Z28.21 IMMUNIZATION NOT CARRIED OUT BECAUSE OF PATIENT REFUSAL: ICD-10-CM

## 2017-10-06 DIAGNOSIS — F03.91 UNSPECIFIED DEMENTIA WITH BEHAVIORAL DISTURBANCE: ICD-10-CM

## 2017-10-06 DIAGNOSIS — E86.0 DEHYDRATION: ICD-10-CM

## 2017-10-06 DIAGNOSIS — F41.0 PANIC DISORDER [EPISODIC PAROXYSMAL ANXIETY]: ICD-10-CM

## 2017-10-06 DIAGNOSIS — M79.673 PAIN IN UNSPECIFIED FOOT: ICD-10-CM

## 2017-10-06 DIAGNOSIS — K21.9 GASTRO-ESOPHAGEAL REFLUX DISEASE WITHOUT ESOPHAGITIS: ICD-10-CM

## 2017-12-19 PROCEDURE — 36415 COLL VENOUS BLD VENIPUNCTURE: CPT

## 2017-12-19 PROCEDURE — 70450 CT HEAD/BRAIN W/O DYE: CPT

## 2017-12-19 PROCEDURE — 80053 COMPREHEN METABOLIC PANEL: CPT

## 2017-12-19 PROCEDURE — 87086 URINE CULTURE/COLONY COUNT: CPT

## 2017-12-19 PROCEDURE — 73630 X-RAY EXAM OF FOOT: CPT

## 2017-12-19 PROCEDURE — 85027 COMPLETE CBC AUTOMATED: CPT

## 2017-12-19 PROCEDURE — 81001 URINALYSIS AUTO W/SCOPE: CPT

## 2017-12-19 PROCEDURE — 71045 X-RAY EXAM CHEST 1 VIEW: CPT

## 2017-12-19 PROCEDURE — 97110 THERAPEUTIC EXERCISES: CPT

## 2017-12-19 PROCEDURE — 93005 ELECTROCARDIOGRAM TRACING: CPT

## 2017-12-19 PROCEDURE — 99285 EMERGENCY DEPT VISIT HI MDM: CPT | Mod: 25

## 2017-12-19 PROCEDURE — 80048 BASIC METABOLIC PNL TOTAL CA: CPT

## 2017-12-19 PROCEDURE — 97530 THERAPEUTIC ACTIVITIES: CPT

## 2017-12-19 PROCEDURE — 93970 EXTREMITY STUDY: CPT

## 2017-12-19 PROCEDURE — 97162 PT EVAL MOD COMPLEX 30 MIN: CPT

## 2017-12-19 PROCEDURE — 73610 X-RAY EXAM OF ANKLE: CPT

## 2018-02-25 ENCOUNTER — INPATIENT (INPATIENT)
Facility: HOSPITAL | Age: 83
LOS: 4 days | Discharge: HOME | DRG: 563 | End: 2018-03-02
Attending: INTERNAL MEDICINE | Admitting: INTERNAL MEDICINE
Payer: MEDICARE

## 2018-02-25 VITALS
OXYGEN SATURATION: 99 % | DIASTOLIC BLOOD PRESSURE: 74 MMHG | TEMPERATURE: 94 F | HEART RATE: 60 BPM | SYSTOLIC BLOOD PRESSURE: 133 MMHG | RESPIRATION RATE: 18 BRPM | WEIGHT: 119.93 LBS

## 2018-02-25 DIAGNOSIS — Z29.9 ENCOUNTER FOR PROPHYLACTIC MEASURES, UNSPECIFIED: ICD-10-CM

## 2018-02-25 DIAGNOSIS — S82.202A UNSPECIFIED FRACTURE OF SHAFT OF LEFT TIBIA, INITIAL ENCOUNTER FOR CLOSED FRACTURE: ICD-10-CM

## 2018-02-25 DIAGNOSIS — F03.90 UNSPECIFIED DEMENTIA WITHOUT BEHAVIORAL DISTURBANCE: ICD-10-CM

## 2018-02-25 DIAGNOSIS — T68.XXXA HYPOTHERMIA, INITIAL ENCOUNTER: ICD-10-CM

## 2018-02-25 DIAGNOSIS — F41.9 ANXIETY DISORDER, UNSPECIFIED: ICD-10-CM

## 2018-02-25 DIAGNOSIS — Z90.12 ACQUIRED ABSENCE OF LEFT BREAST AND NIPPLE: Chronic | ICD-10-CM

## 2018-02-25 DIAGNOSIS — I10 ESSENTIAL (PRIMARY) HYPERTENSION: ICD-10-CM

## 2018-02-25 DIAGNOSIS — N17.9 ACUTE KIDNEY FAILURE, UNSPECIFIED: ICD-10-CM

## 2018-02-25 DIAGNOSIS — K21.9 GASTRO-ESOPHAGEAL REFLUX DISEASE WITHOUT ESOPHAGITIS: ICD-10-CM

## 2018-02-25 DIAGNOSIS — G89.4 CHRONIC PAIN SYNDROME: ICD-10-CM

## 2018-02-25 DIAGNOSIS — Z71.89 OTHER SPECIFIED COUNSELING: ICD-10-CM

## 2018-02-25 LAB
ALBUMIN SERPL ELPH-MCNC: 3.5 G/DL — SIGNIFICANT CHANGE UP (ref 3.3–5)
ALP SERPL-CCNC: 193 U/L — HIGH (ref 40–120)
ALT FLD-CCNC: 13 U/L — SIGNIFICANT CHANGE UP (ref 12–78)
ANION GAP SERPL CALC-SCNC: 8 MMOL/L — SIGNIFICANT CHANGE UP (ref 5–17)
APPEARANCE UR: CLEAR — SIGNIFICANT CHANGE UP
AST SERPL-CCNC: 18 U/L — SIGNIFICANT CHANGE UP (ref 15–37)
BACTERIA # UR AUTO: ABNORMAL
BASOPHILS # BLD AUTO: 0.1 K/UL — SIGNIFICANT CHANGE UP (ref 0–0.2)
BASOPHILS NFR BLD AUTO: 1 % — SIGNIFICANT CHANGE UP (ref 0–2)
BILIRUB SERPL-MCNC: 0.5 MG/DL — SIGNIFICANT CHANGE UP (ref 0.2–1.2)
BILIRUB UR-MCNC: NEGATIVE — SIGNIFICANT CHANGE UP
BUN SERPL-MCNC: 38 MG/DL — HIGH (ref 7–23)
CALCIUM SERPL-MCNC: 8.6 MG/DL — SIGNIFICANT CHANGE UP (ref 8.5–10.1)
CHLORIDE SERPL-SCNC: 104 MMOL/L — SIGNIFICANT CHANGE UP (ref 96–108)
CK SERPL-CCNC: 39 U/L — SIGNIFICANT CHANGE UP (ref 26–192)
CO2 SERPL-SCNC: 25 MMOL/L — SIGNIFICANT CHANGE UP (ref 22–31)
COLOR SPEC: YELLOW — SIGNIFICANT CHANGE UP
CREAT SERPL-MCNC: 1.4 MG/DL — HIGH (ref 0.5–1.3)
DIFF PNL FLD: ABNORMAL
EOSINOPHIL # BLD AUTO: 0.1 K/UL — SIGNIFICANT CHANGE UP (ref 0–0.5)
EOSINOPHIL NFR BLD AUTO: 1.1 % — SIGNIFICANT CHANGE UP (ref 0–6)
EPI CELLS # UR: SIGNIFICANT CHANGE UP
GLUCOSE SERPL-MCNC: 112 MG/DL — HIGH (ref 70–99)
GLUCOSE UR QL: NEGATIVE — SIGNIFICANT CHANGE UP
HCT VFR BLD CALC: 33.1 % — LOW (ref 34.5–45)
HGB BLD-MCNC: 10.8 G/DL — LOW (ref 11.5–15.5)
INR BLD: 1.09 RATIO — SIGNIFICANT CHANGE UP (ref 0.88–1.16)
KETONES UR-MCNC: NEGATIVE — SIGNIFICANT CHANGE UP
LACTATE SERPL-SCNC: 1.5 MMOL/L — SIGNIFICANT CHANGE UP (ref 0.7–2)
LEUKOCYTE ESTERASE UR-ACNC: NEGATIVE — SIGNIFICANT CHANGE UP
LYMPHOCYTES # BLD AUTO: 2.5 K/UL — SIGNIFICANT CHANGE UP (ref 1–3.3)
LYMPHOCYTES # BLD AUTO: 27.2 % — SIGNIFICANT CHANGE UP (ref 13–44)
MCHC RBC-ENTMCNC: 32.5 GM/DL — SIGNIFICANT CHANGE UP (ref 32–36)
MCHC RBC-ENTMCNC: 32.7 PG — SIGNIFICANT CHANGE UP (ref 27–34)
MCV RBC AUTO: 100.5 FL — HIGH (ref 80–100)
MONOCYTES # BLD AUTO: 1.2 K/UL — HIGH (ref 0–0.9)
MONOCYTES NFR BLD AUTO: 13.6 % — HIGH (ref 1–9)
NEUTROPHILS # BLD AUTO: 5.2 K/UL — SIGNIFICANT CHANGE UP (ref 1.8–7.4)
NEUTROPHILS NFR BLD AUTO: 57 % — SIGNIFICANT CHANGE UP (ref 43–77)
NITRITE UR-MCNC: NEGATIVE — SIGNIFICANT CHANGE UP
PH UR: 5 — SIGNIFICANT CHANGE UP (ref 5–8)
PLATELET # BLD AUTO: 199 K/UL — SIGNIFICANT CHANGE UP (ref 150–400)
POTASSIUM SERPL-MCNC: 4.7 MMOL/L — SIGNIFICANT CHANGE UP (ref 3.5–5.3)
POTASSIUM SERPL-SCNC: 4.7 MMOL/L — SIGNIFICANT CHANGE UP (ref 3.5–5.3)
PROT SERPL-MCNC: 6.9 G/DL — SIGNIFICANT CHANGE UP (ref 6–8.3)
PROT UR-MCNC: NEGATIVE — SIGNIFICANT CHANGE UP
PROTHROM AB SERPL-ACNC: 11.9 SEC — SIGNIFICANT CHANGE UP (ref 9.8–12.7)
RBC # BLD: 3.3 M/UL — LOW (ref 3.8–5.2)
RBC # FLD: 14.1 % — SIGNIFICANT CHANGE UP (ref 10.3–14.5)
RBC CASTS # UR COMP ASSIST: SIGNIFICANT CHANGE UP /HPF (ref 0–4)
SODIUM SERPL-SCNC: 137 MMOL/L — SIGNIFICANT CHANGE UP (ref 135–145)
SP GR SPEC: 1.01 — SIGNIFICANT CHANGE UP (ref 1.01–1.02)
TROPONIN I SERPL-MCNC: <.015 NG/ML — SIGNIFICANT CHANGE UP (ref 0.01–0.04)
UROBILINOGEN FLD QL: NEGATIVE — SIGNIFICANT CHANGE UP
WBC # BLD: 9.1 K/UL — SIGNIFICANT CHANGE UP (ref 3.8–10.5)
WBC # FLD AUTO: 9.1 K/UL — SIGNIFICANT CHANGE UP (ref 3.8–10.5)
WBC UR QL: SIGNIFICANT CHANGE UP

## 2018-02-25 PROCEDURE — 73552 X-RAY EXAM OF FEMUR 2/>: CPT | Mod: 26,LT

## 2018-02-25 PROCEDURE — 73610 X-RAY EXAM OF ANKLE: CPT | Mod: 26,LT

## 2018-02-25 PROCEDURE — 99223 1ST HOSP IP/OBS HIGH 75: CPT | Mod: AI,GC

## 2018-02-25 PROCEDURE — 99285 EMERGENCY DEPT VISIT HI MDM: CPT

## 2018-02-25 PROCEDURE — 73700 CT LOWER EXTREMITY W/O DYE: CPT | Mod: 26,LT

## 2018-02-25 PROCEDURE — 72125 CT NECK SPINE W/O DYE: CPT | Mod: 26

## 2018-02-25 PROCEDURE — 71045 X-RAY EXAM CHEST 1 VIEW: CPT | Mod: 26

## 2018-02-25 PROCEDURE — 72192 CT PELVIS W/O DYE: CPT | Mod: 26

## 2018-02-25 PROCEDURE — 70450 CT HEAD/BRAIN W/O DYE: CPT | Mod: 26

## 2018-02-25 PROCEDURE — 73521 X-RAY EXAM HIPS BI 2 VIEWS: CPT | Mod: 26

## 2018-02-25 PROCEDURE — 73590 X-RAY EXAM OF LOWER LEG: CPT | Mod: 26,LT

## 2018-02-25 PROCEDURE — 73562 X-RAY EXAM OF KNEE 3: CPT | Mod: 26,LT

## 2018-02-25 PROCEDURE — 99497 ADVNCD CARE PLAN 30 MIN: CPT | Mod: 25

## 2018-02-25 PROCEDURE — 93010 ELECTROCARDIOGRAM REPORT: CPT

## 2018-02-25 RX ORDER — NIFEDIPINE 30 MG
10 TABLET, EXTENDED RELEASE 24 HR ORAL EVERY 12 HOURS
Qty: 0 | Refills: 0 | Status: DISCONTINUED | OUTPATIENT
Start: 2018-02-25 | End: 2018-03-02

## 2018-02-25 RX ORDER — TRAMADOL HYDROCHLORIDE 50 MG/1
50 TABLET ORAL EVERY 6 HOURS
Qty: 0 | Refills: 0 | Status: DISCONTINUED | OUTPATIENT
Start: 2018-02-25 | End: 2018-03-02

## 2018-02-25 RX ORDER — MORPHINE SULFATE 50 MG/1
2 CAPSULE, EXTENDED RELEASE ORAL ONCE
Qty: 0 | Refills: 0 | Status: DISCONTINUED | OUTPATIENT
Start: 2018-02-25 | End: 2018-02-25

## 2018-02-25 RX ORDER — METOPROLOL TARTRATE 50 MG
25 TABLET ORAL
Qty: 0 | Refills: 0 | Status: DISCONTINUED | OUTPATIENT
Start: 2018-02-25 | End: 2018-03-02

## 2018-02-25 RX ORDER — SENNA PLUS 8.6 MG/1
2 TABLET ORAL AT BEDTIME
Qty: 0 | Refills: 0 | Status: DISCONTINUED | OUTPATIENT
Start: 2018-02-25 | End: 2018-03-02

## 2018-02-25 RX ORDER — DOCUSATE SODIUM 100 MG
100 CAPSULE ORAL THREE TIMES A DAY
Qty: 0 | Refills: 0 | Status: DISCONTINUED | OUTPATIENT
Start: 2018-02-25 | End: 2018-03-02

## 2018-02-25 RX ORDER — SODIUM CHLORIDE 9 MG/ML
1000 INJECTION INTRAMUSCULAR; INTRAVENOUS; SUBCUTANEOUS ONCE
Qty: 0 | Refills: 0 | Status: COMPLETED | OUTPATIENT
Start: 2018-02-25 | End: 2018-02-25

## 2018-02-25 RX ORDER — ACETAMINOPHEN 500 MG
650 TABLET ORAL EVERY 6 HOURS
Qty: 0 | Refills: 0 | Status: DISCONTINUED | OUTPATIENT
Start: 2018-02-25 | End: 2018-03-02

## 2018-02-25 RX ORDER — TRANYLCYPROMINE SULFATE 10 MG/1
10 TABLET, FILM COATED ORAL THREE TIMES A DAY
Qty: 0 | Refills: 0 | Status: DISCONTINUED | OUTPATIENT
Start: 2018-02-25 | End: 2018-03-02

## 2018-02-25 RX ORDER — HEPARIN SODIUM 5000 [USP'U]/ML
5000 INJECTION INTRAVENOUS; SUBCUTANEOUS EVERY 12 HOURS
Qty: 0 | Refills: 0 | Status: DISCONTINUED | OUTPATIENT
Start: 2018-02-25 | End: 2018-03-02

## 2018-02-25 RX ORDER — ALPRAZOLAM 0.25 MG
0.5 TABLET ORAL
Qty: 0 | Refills: 0 | Status: DISCONTINUED | OUTPATIENT
Start: 2018-02-25 | End: 2018-03-02

## 2018-02-25 RX ORDER — PANTOPRAZOLE SODIUM 20 MG/1
40 TABLET, DELAYED RELEASE ORAL DAILY
Qty: 0 | Refills: 0 | Status: DISCONTINUED | OUTPATIENT
Start: 2018-02-25 | End: 2018-02-25

## 2018-02-25 RX ORDER — ACETAMINOPHEN 500 MG
650 TABLET ORAL ONCE
Qty: 0 | Refills: 0 | Status: COMPLETED | OUTPATIENT
Start: 2018-02-25 | End: 2018-02-25

## 2018-02-25 RX ORDER — PANTOPRAZOLE SODIUM 20 MG/1
40 TABLET, DELAYED RELEASE ORAL DAILY
Qty: 0 | Refills: 0 | Status: DISCONTINUED | OUTPATIENT
Start: 2018-02-25 | End: 2018-02-26

## 2018-02-25 RX ORDER — OXYCODONE AND ACETAMINOPHEN 5; 325 MG/1; MG/1
1 TABLET ORAL EVERY 6 HOURS
Qty: 0 | Refills: 0 | Status: DISCONTINUED | OUTPATIENT
Start: 2018-02-25 | End: 2018-03-02

## 2018-02-25 RX ADMIN — TRANYLCYPROMINE SULFATE 10 MILLIGRAM(S): 10 TABLET, FILM COATED ORAL at 21:38

## 2018-02-25 RX ADMIN — OXYCODONE AND ACETAMINOPHEN 1 TABLET(S): 5; 325 TABLET ORAL at 14:14

## 2018-02-25 RX ADMIN — MORPHINE SULFATE 2 MILLIGRAM(S): 50 CAPSULE, EXTENDED RELEASE ORAL at 09:00

## 2018-02-25 RX ADMIN — TRANYLCYPROMINE SULFATE 10 MILLIGRAM(S): 10 TABLET, FILM COATED ORAL at 16:04

## 2018-02-25 RX ADMIN — Medication 100 MILLIGRAM(S): at 21:36

## 2018-02-25 RX ADMIN — HEPARIN SODIUM 5000 UNIT(S): 5000 INJECTION INTRAVENOUS; SUBCUTANEOUS at 17:36

## 2018-02-25 RX ADMIN — PANTOPRAZOLE SODIUM 40 MILLIGRAM(S): 20 TABLET, DELAYED RELEASE ORAL at 13:04

## 2018-02-25 RX ADMIN — Medication 25 MILLIGRAM(S): at 17:36

## 2018-02-25 RX ADMIN — OXYCODONE AND ACETAMINOPHEN 1 TABLET(S): 5; 325 TABLET ORAL at 17:15

## 2018-02-25 RX ADMIN — Medication 650 MILLIGRAM(S): at 06:38

## 2018-02-25 RX ADMIN — Medication 0.5 MILLIGRAM(S): at 16:38

## 2018-02-25 RX ADMIN — SODIUM CHLORIDE 1000 MILLILITER(S): 9 INJECTION INTRAMUSCULAR; INTRAVENOUS; SUBCUTANEOUS at 06:38

## 2018-02-25 RX ADMIN — TRAMADOL HYDROCHLORIDE 50 MILLIGRAM(S): 50 TABLET ORAL at 22:36

## 2018-02-25 RX ADMIN — Medication 10 MILLIGRAM(S): at 17:36

## 2018-02-25 RX ADMIN — TRAMADOL HYDROCHLORIDE 50 MILLIGRAM(S): 50 TABLET ORAL at 21:36

## 2018-02-25 RX ADMIN — Medication 100 MILLIGRAM(S): at 16:04

## 2018-02-25 RX ADMIN — Medication 650 MILLIGRAM(S): at 08:00

## 2018-02-25 RX ADMIN — MORPHINE SULFATE 2 MILLIGRAM(S): 50 CAPSULE, EXTENDED RELEASE ORAL at 08:34

## 2018-02-25 NOTE — H&P ADULT - NSHPSOCIALHISTORY_GEN_ALL_CORE
never smoker  never drinker  never recreational drug user  cannot perform any ADL's, relies on son for assistance w/ all ADL's  consumes soft diet w/ thin liquids at home    Family Hx: son states that there is no premature CAD, DM2, CVA in immediate family members never smoker  never drinker  never recreational drug user  cannot perform any ADL's, relies on son for assistance w/ all ADL's  consumes soft diet w/ thin liquids at home  Patient lives at home with son and does not use assistive devices.     Family Hx: son states that there is no premature CAD, DM2, CVA in immediate family members

## 2018-02-25 NOTE — CONSULT NOTE ADULT - ASSESSMENT
A/P: 89y Female with L tibial plateau and proximal fibula fracture  Pain control  NWB LLE, bedrest  FU labs/imaging, CT scans  Admit to medical team  Will Discuss with attending A/P: 89y Female with L tibial plateau and proximal fibula fracture  Pain control  NWB LLE, bedrest, maintain in bulky harp knee immobilizer  FU labs/imaging, CT scans official reads  DVT PPX  Admit to medical team  No acute orthopedic surgical intervention at this time  Discussed with Dr. De León who agrees with above

## 2018-02-25 NOTE — H&P ADULT - PROBLEM SELECTOR PLAN 9
DNR/DNI. Patient's family does not want artificial feeds.   SW consult for likely ELIAS  PT consult IMPROVE VTE Individual Risk Assessment          RISK                                                          Points  [  ] Previous VTE                                                3  [  ] Thrombophilia                                             2  [  ] Lower limb paralysis                                   2        (unable to hold up >15 seconds)    [  ] Current Cancer                                             2         (within 6 months)  [ x ] Immobilization > 24 hrs                              1  [  ] ICU/CCU stay > 24 hours                             1  [  x] Age > 60                                                         1    IMPROVE VTE Score: 2    Heparin 5000 units SQ BID for dvt ppx

## 2018-02-25 NOTE — PATIENT PROFILE ADULT. - SOURCE OF INFORMATION, PROFILE
chart(s)/Copy Forward H&P from 2/25/2018, info verified with HCP. son HCP Daron Segundo  [phone]. Pt poor ./family

## 2018-02-25 NOTE — H&P ADULT - PROBLEM SELECTOR PLAN 8
IMPROVE VTE Individual Risk Assessment          RISK                                                          Points  [  ] Previous VTE                                                3  [  ] Thrombophilia                                             2  [  ] Lower limb paralysis                                   2        (unable to hold up >15 seconds)    [  ] Current Cancer                                             2         (within 6 months)  [ x ] Immobilization > 24 hrs                              1  [  ] ICU/CCU stay > 24 hours                             1  [  x] Age > 60                                                         1    IMPROVE VTE Score: 2    SCDs for dvt ppx as patient is at risk for falls GERD, chronic.   - IV protonix 40 daily as patient's son states the patient needs Protonix on time every day as she has bad dysphagia if she does not get protonix routinely.

## 2018-02-25 NOTE — H&P ADULT - PROBLEM SELECTOR PLAN 2
Hypothermia, T: 94.5 in ED. Patient given hyperthermia blanket.   - monitor vital signs   -continue hyperthermia blanket, warm packs Hypothermia, T: 94.5 in ED. Patient given hyperthermia blanket.   - monitor vital signs   -continue hyperthermia blanket, warm packs  -f/u blood and urine cultures

## 2018-02-25 NOTE — ED ADULT NURSE REASSESSMENT NOTE - NS ED NURSE REASSESS COMMENT FT1
Pt is very [place], confused about situation/location. Pt does not recall falling out of bed. Pt is C/O left knee/leg pain, unable to state pain scale, appears to be in severe pain with movement. MD made aware, medicated as per MD orders. Pt remains hypothermic, son states baseline temp is 94-95F. Pt cleaned & linens changed after BM. Pt placed in knee immobilizer by ortho. Pt at CT at this time. Will CTM.

## 2018-02-25 NOTE — PATIENT PROFILE ADULT. - HEALTH/HEALTHCARE ANXIETIES, PROFILE
per HCP son, "she is nervous and naturally gets depressed due to she can't do the things she wants to do"

## 2018-02-25 NOTE — PATIENT PROFILE ADULT. - FALL HARM RISK
age(85 years old or older)/coagulation(Bleeding disorder R/T clinical cond/anti-coags)/bones(Osteoporosis,prev fx,steroid use,metastatic bone ca/other/s/p fall at home 2/24@11PM per son pt fell OOB

## 2018-02-25 NOTE — H&P ADULT - PROBLEM SELECTOR PLAN 1
Fracture of tibia and fibula s/p mechanical fall  - admit to F  - Ortho consulted. No surgical intervention required at this time.   - SW consult  - fall precautions  - pain management Fracture of tibia and fibula s/p mechanical fall  - admit to [facility]  - Ortho consulted. No surgical intervention required at this time.   - SW consult for possible ELIAS  - PT eval  - fall precautions  -safety precautions   - pain management Fracture of tibia and fibula s/p mechanical fall  - admit to [facility]  - Tylenol for mild pain, percocet for moderate pain, tramadol for severe pain.   -Will hold home vicodin for now.   - Ortho consulted. No surgical intervention required at this time.   - SW consult for possible ELIAS  - PT eval  - fall precautions  -safety precautions   - pain management

## 2018-02-25 NOTE — H&P ADULT - PROBLEM SELECTOR PLAN 5
REGINALDO 2/2 likely dehydration. BUN/Cr elevated REGINALDO 2/2 likely dehydration. BUN/Cr elevated  - Patient given 1L NS bolus in ED.   - monitor AM labs HTN, chronic, stable.   - Continue Metoprolol tartrate 25mg BID and Cozaar 100mg PO daily with hold parameters. HTN, chronic, stable.   - Continue Metoprolol tartrate 25mg BID with hold parameters.   - Hold Cozaar 100mg PO daily in setting of REGINALDO. Resume when REGINALDO resolves.  -monitor vital signs q12 hours (morning and night)

## 2018-02-25 NOTE — CONSULT NOTE ADULT - SUBJECTIVE AND OBJECTIVE BOX
89y Female minimal household ambulator with 1 person assist presents c/o L knee pain after an unwitnessed roll out of bed from a low height. Per Son the patient has dementia and rolled out of bed this morning. Per son the patient ambulates minimally around the house too and from the bathroom or kitchen under one person carry assist and is only oriented to person. Per son the patient has a history of bilateral lower extremity and knee pain with numbness below the knees for "years". He also states that she had been receiving injections in her right knee "years ago" for arthritis. The patient is unable to answer questions appropriately but follows some commands, she then forgets and asks where she is and what is happening every few moments.       PAST MEDICAL & SURGICAL HISTORY:  HTN (hypertension)  Dementia  Arthritis  Neuropathy  Spinal Surgery for Stenosis type uknown  H/O left mastectomy    MEDICATIONS  (STANDING):  docusate sodium 100 milliGRAM(s) Oral three times a day    Allergies    No Known Allergies    Intolerances                            10.8   9.1   )-----------( 199      ( 25 Feb 2018 06:30 )             33.1     25 Feb 2018 06:30    137    |  104    |  38     ----------------------------<  112    4.7     |  25     |  1.40     Ca    8.6        25 Feb 2018 06:30    TPro  6.9    /  Alb  3.5    /  TBili  0.5    /  DBili  x      /  AST  18     /  ALT  13     /  AlkPhos  193    25 Feb 2018 06:30    PT/INR - ( 25 Feb 2018 06:30 )   PT: 11.9 sec;   INR: 1.09 ratio           Vital Signs Last 24 Hrs  T(C): 34.7 (02-25-18 @ 08:00), Max: 34.7 (02-25-18 @ 05:45)  T(F): 94.4 (02-25-18 @ 08:00), Max: 94.5 (02-25-18 @ 05:45)  HR: 60 (02-25-18 @ 08:00) (60 - 60)  BP: 129/61 (02-25-18 @ 08:00) (129/61 - 133/74)  BP(mean): --  RR: 16 (02-25-18 @ 08:00) (16 - 18)  SpO2: 98% (02-25-18 @ 08:00) (98% - 99%)    Imaging: XR demonstrates L tibial plateau and proximal fibula fractures  Pending CT scans    Physical Exam  General: Awake, not oriented,   Neurologic: No gross deficits, moving all 4s. Patient rolling around in bed on examination.     Left LE: No open skin. Ecchymosis noted from the knee down to just proximal to the ankle. No deformities or other signs of trauma at lower leg, ankle or foot. Full baseline painless ROM at ankle and toes with. Negative log-roll. Patient actively SLR on inspection prior to exam. Patient unable to follow commands for movement of ankles bilaterally, Unable to assess sensory 2/2 mental status. 2+ DP pulse with brisk cap refill distally. Compartments soft and compressible.     Secondary Survey: Full ROM of unaffected extremities, able to SLR B/L, SILT Globally, compartments soft, no bony TTP over bony prominences, no calf TTP B/L, no TTP along axial spine. 89y Female minimal household ambulator with 1 person assist presents c/o L knee pain after an unwitnessed roll out of bed from a low height. Per Son the patient has dementia and rolled out of bed this morning. Per son the patient ambulates minimally around the house too and from the bathroom or kitchen under one person carry assist and is only oriented to person. Per son the patient has a history of bilateral lower extremity and knee pain with numbness below the knees for "years". He also states that she had been receiving injections in her right knee "years ago" for arthritis. The patient is unable to answer questions appropriately but follows some commands, she then forgets and asks where she is and what is happening every few moments.       PAST MEDICAL & SURGICAL HISTORY:  HTN (hypertension)  Dementia  Arthritis  Neuropathy  Spinal Surgery for Stenosis type uknown  H/O left mastectomy    MEDICATIONS  (STANDING):  docusate sodium 100 milliGRAM(s) Oral three times a day    Allergies    No Known Allergies    Intolerances                            10.8   9.1   )-----------( 199      ( 25 Feb 2018 06:30 )             33.1     25 Feb 2018 06:30    137    |  104    |  38     ----------------------------<  112    4.7     |  25     |  1.40     Ca    8.6        25 Feb 2018 06:30    TPro  6.9    /  Alb  3.5    /  TBili  0.5    /  DBili  x      /  AST  18     /  ALT  13     /  AlkPhos  193    25 Feb 2018 06:30    PT/INR - ( 25 Feb 2018 06:30 )   PT: 11.9 sec;   INR: 1.09 ratio           Vital Signs Last 24 Hrs  T(C): 34.7 (02-25-18 @ 08:00), Max: 34.7 (02-25-18 @ 05:45)  T(F): 94.4 (02-25-18 @ 08:00), Max: 94.5 (02-25-18 @ 05:45)  HR: 60 (02-25-18 @ 08:00) (60 - 60)  BP: 129/61 (02-25-18 @ 08:00) (129/61 - 133/74)  BP(mean): --  RR: 16 (02-25-18 @ 08:00) (16 - 18)  SpO2: 98% (02-25-18 @ 08:00) (98% - 99%)    Imaging: XR demonstrates L tibial plateau and proximal fibula fractures  CT Pelvis: No acute fracture/dislocations noted pending official read  CT L Knee: Tibial plateau fracture and proximal fibula fracture      Physical Exam  General: Awake, not oriented,   Neurologic: No gross deficits, moving all 4s. Patient rolling around in bed on examination.     Left LE: No open skin. Ecchymosis noted from the knee down to just proximal to the ankle. No deformities or other signs of trauma at lower leg, ankle or foot. Full baseline painless ROM at ankle and toes with. Negative log-roll. Patient actively SLR on inspection prior to exam. Patient unable to follow commands for movement of ankles bilaterally, Unable to assess sensory 2/2 mental status. 2+ DP pulse with brisk cap refill distally. Compartments soft and compressible.     Secondary Survey: Full ROM of unaffected extremities, able to SLR B/L, SILT Globally, compartments soft, no bony TTP over bony prominences, no calf TTP B/L, no TTP along axial spine.

## 2018-02-25 NOTE — ED PROVIDER NOTE - OBJECTIVE STATEMENT
89 female presents to ER by ambulance with report of fall out of bed, left leg pain, son is on his way to ER. Patient provides no information, details obtained from EMS.

## 2018-02-25 NOTE — CHART NOTE - NSCHARTNOTEFT_GEN_A_CORE
Compartment check    Patient is more responsive at this time following more commands  LLE Exam: Compartments soft/compressible, no pain with passive stretch of toes/foot/ankle, EHL/FHL/TA/GS intact, DP pulse palpable, diminished sensation over foot per patient/son at baseline.
Patient resting comfortably eating dinner    LLE Exam: Compartments soft/compressible, no pain with passive stretch of toes/foot/ankle, EHL/FHL/TA/GS intact, DP pulse palpable, diminished sensation over foot  unchanged from previous

## 2018-02-25 NOTE — PATIENT PROFILE ADULT. - VISION (WITH CORRECTIVE LENSES IF THE PATIENT USUALLY WEARS THEM):
reading glasses not with pt/Partially impaired: cannot see medication labels or newsprint, but can see obstacles in path, and the surrounding layout; can count fingers at arm's length

## 2018-02-25 NOTE — H&P ADULT - PROBLEM SELECTOR PLAN 6
IMPROVE VTE Individual Risk Assessment          RISK                                                          Points  [  ] Previous VTE                                                3  [  ] Thrombophilia                                             2  [  ] Lower limb paralysis                                   2        (unable to hold up >15 seconds)    [  ] Current Cancer                                             2         (within 6 months)  [ x ] Immobilization > 24 hrs                              1  [  ] ICU/CCU stay > 24 hours                             1  [  x] Age > 60                                                         1    IMPROVE VTE Score: 2    SCDs for dvt ppx as patient is at risk for falls discussed sedative effects of opiates w/ the son, he gives 1/2 tab every 3-4 hours at home due to c/o constant pain in knees and lumbar spine  opiates are high risk medications for elderly pt's but he states that she has tolerated this regimen for years and recommends continuing for now. opiates are high risk medications for elderly pt's. Will hold home vicodin for now.   - Tylenol for mild, percocet for moderate pain, tramadol for severe pain.

## 2018-02-25 NOTE — ED PROVIDER NOTE - CARE PLAN
Principal Discharge DX:	Fracture of tibia and fibula, left, closed, initial encounter  Secondary Diagnosis:	Unable to ambulate  Secondary Diagnosis:	Hypothermia, initial encounter

## 2018-02-25 NOTE — H&P ADULT - PROBLEM SELECTOR PLAN 7
discussed sedative adverse effect profile w/ the son  he states that she becomes extremely agitated especially at night and he gives 0.25-0.5mg of xanax w/ good effect  he recommends continuing this regimen. Will continue Xanax 0.5mg [id number] prn as patient has been taking it for years.   Please hold for lethargy or sedation.

## 2018-02-25 NOTE — H&P ADULT - HISTORY OF PRESENT ILLNESS
89yo F with PMHx of advanced dementia, HTN, GERD, chronic pain, anxiety w/ panic attacks presents s/p mechanical fall after "falling out of bed" at home. She is complaining of left leg pain. The patient’s son is elderly and states that he cannot provide the care she requires in the home. Son is HCP. Patient is [facility]   Of note, patient was admitted in October 2017 for FTT at home. During that admission, patient was made DNR/DNI.   In the ED, vital signs were T: 94.5, HR 60, /74, R 18, SpO2 99% RA. Patient given Hyperthermia blanket for decreased temperature. Lactate 1.5. CMP significant for elevated Cr 1.4 and BUN 38, alk phos elevated to 193. UA negative. X-ray of ankle , X-ray hip, Tibia and Fibula, X-ray knee  CXR shows… EKG Cardiac enzymes were negative x1. Patient given NS bolus x1, Tylenol x1, blood cultures pending, urine culture pending. Ortho consulted to evaluate patient. 89yo F with PMHx of advanced dementia, HTN, GERD, chronic pain, anxiety w/ panic attacks presents s/p mechanical fall after "falling out of bed" at home. She is complaining of left leg pain. The patient’s son is elderly and states that he cannot provide the care she requires in the home. Son is HCP. Patient is [place] with baseline severe dementia (AOx1 self). Spoke to son who states her baseline temperature runs around 94F.   Of note, patient was admitted in October 2017 for FTT at home. During that admission, patient was made DNR/DNI.   In the ED, vital signs were T: 94.5, HR 60, /74, R 18, SpO2 99% RA. Patient given Hyperthermia blanket for decreased temperature. Lactate 1.5. CMP significant for elevated Cr 1.4 and BUN 38, alk phos elevated to 193. UA negative. X-ray of ankle , X-ray hip, Tibia and Fibula, X-ray knee  CXR shows… EKG Cardiac enzymes were negative x1. Patient given NS bolus x1, Tylenol x1, blood cultures pending, urine culture pending. Ortho consulted to evaluate patient. 89yo F with PMHx of advanced dementia, HTN, GERD, chronic pain, anxiety w/ panic attacks presents s/p mechanical fall after "falling out of bed" at home. She is complaining of left leg pain. The patient’s son is present at bedside and he is her HCP providing history. Patient is [place] with baseline severe dementia (AOx1 self). The patient is unable to answer questions appropriately but follows some commands, she then forgets and asks where she is and what is happening every few moments. He states that she doesn't sleep well at night and she must have been reaching for her glass of water at night and rolled out of bed this morning. The patient ambulates minimally around the house with assistance from him as patient does not use assistive devices. The patient has chronic pain in bilateral lower extremities and knee pain with numbness below the knees for many years. She has had 2 spinal stenosis surgeries in the past. As per son, her baseline temperature runs around 94F.   Of note, patient was admitted in October 2017 for FTT at home. During that admission, patient was made DNR/DNI.   In the ED, vital signs were T: 94.5, HR 60, /74, R 18, SpO2 99% RA. Patient given Hyperthermia blanket for decreased temperature. Lactate 1.5. CMP significant for elevated Cr 1.4 and BUN 38, alk phos elevated to 193. UA negative. X-ray of ankle , X-ray hip, Tibia and Fibula, X-ray knee . CT head shows: scattered chronic ischemic changes with atrophy. No acute abnormality noted. No intracerebral hemorrhage or contusion is identified. CXR shows… EKG shows sinus rhythm with PACs, RBBB. Cardiac enzymes were negative x1. Patient given NS bolus x1, Tylenol x1, blood cultures pending, urine culture pending. Ortho consulted to evaluate patient. 89yo F with PMHx of advanced dementia, HTN, GERD, chronic pain, anxiety w/ panic attacks presents s/p mechanical fall after "falling out of bed" at home. She is complaining of left leg pain. The patient’s son is present at bedside and he is her HCP providing history. Patient is [facility] with baseline severe dementia (AOx1 self). The patient is unable to answer questions appropriately but follows some commands, she then forgets and asks where she is and what is happening every few moments. He states that she doesn't sleep well at night and she must have been reaching for her glass of water at night and rolled out of bed this morning. The patient ambulates minimally around the house with assistance from him as patient does not use assistive devices. The patient has chronic pain in bilateral lower extremities and knee pain with numbness below the knees for many years. She has had 2 spinal stenosis surgeries in the past. As per son, her baseline temperature runs around 94F.   Of note, patient was admitted in October 2017 for FTT at home. During that admission, patient was made DNR/DNI. No artificial feeding desired either. Discussed with patient's son who is HCP.   In the ED, vital signs were T: 94.5, HR 60, /74, R 18, SpO2 99% RA. Patient given Hyperthermia blanket for decreased temperature. Lactate 1.5. CMP significant for elevated Cr 1.4 and BUN 38, alk phos elevated to 193. UA negative. X-ray of ankle , X-ray hip, Tibia and Fibula, X-ray knee performed and showed comminuted acute fracture of the proximal tibia. Comminuted acute fracture of the fibular head and neck.. CT cervical spine: No acute fracture or traumatic subluxation. CT head shows: scattered chronic ischemic changes with atrophy. No acute abnormality noted. No intracerebral hemorrhage or contusion is identified. CT pelvis &knee: BONY PELVIS: No pelvic fracture LEFT KNEE: Moderately comminuted proximal tibial fracture with nondepressed linear extension to the articular surface in the region of the tibial spines.CXR shows no active infiltrates. EKG shows sinus rhythm with PACs, RBBB. Cardiac enzymes were negative x1. Patient given NS bolus x1, Tylenol x1, blood cultures pending, urine culture pending. Ortho consulted to evaluate patient. 89yo F with PMHx of  dementia, HTN, GERD, chronic pain, anxiety w/ panic attacks presents s/p mechanical fall after "falling out of bed" at home. She is complaining of left leg pain. The patient’s son is present at bedside and he is her HCP providing history. Patient is [facility] with baseline severe dementia (AOx1 self). The patient is unable to answer questions appropriately but follows some commands, she then forgets and asks where she is and what is happening every few moments. He states that she doesn't sleep well at night and she must have been reaching for her glass of water at night and rolled out of bed this morning. The patient ambulates minimally around the house with assistance from him as patient does not use assistive devices. The patient has chronic pain in bilateral lower extremities and knee pain with numbness below the knees for many years. She has had 2 spinal stenosis surgeries in the past. As per son, her baseline temperature runs around 94F.   Of note, patient was admitted in October 2017 for FTT at home. During that admission, patient was made DNR/DNI. No artificial feeding desired either. Discussed with patient's son who is HCP.   In the ED, vital signs were T: 94.5, HR 60, /74, R 18, SpO2 99% RA. Patient given Hyperthermia blanket for decreased temperature. Lactate 1.5. CMP significant for elevated Cr 1.4 and BUN 38, alk phos elevated to 193. UA negative. X-ray of ankle , X-ray hip, Tibia and Fibula, X-ray knee performed and showed comminuted acute fracture of the proximal tibia. Comminuted acute fracture of the fibular head and neck.. CT cervical spine: No acute fracture or traumatic subluxation. CT head shows: scattered chronic ischemic changes with atrophy. No acute abnormality noted. No intracerebral hemorrhage or contusion is identified. CT pelvis &knee: BONY PELVIS: No pelvic fracture LEFT KNEE: Moderately comminuted proximal tibial fracture with nondepressed linear extension to the articular surface in the region of the tibial spines.CXR shows no active infiltrates. EKG shows sinus rhythm with PACs, RBBB. Cardiac enzymes were negative x1. Patient given NS bolus x1, Tylenol x1, blood cultures pending, urine culture pending. Ortho consulted to evaluate patient. 89yo F with PMHx of  dementia, HTN, GERD, chronic pain, anxiety w/ panic attacks presents s/p mechanical fall after "falling out of bed" at home. She is complaining of left leg pain. The patient’s son is present at bedside and he is her HCP providing history. Patient is [place] with baseline severe dementia (AOx1 self). The patient is unable to answer questions appropriately but follows some commands, she then forgets and asks where she is and what is happening every few moments. He states that she doesn't sleep well at night and she must have been reaching for her glass of water at night and rolled out of bed this morning. The patient ambulates minimally around the house with assistance from him as patient does not use assistive devices. He states that she did not hit her head and denied LOC. The patient has chronic pain in bilateral lower extremities and knee pain with numbness below the knees for many years. She has had 2 spinal stenosis surgeries in the past. As per son, her baseline temperature runs around 94F.   Of note, patient was admitted in October 2017 for FTT at home. During that admission, patient was made DNR/DNI. No artificial feeding desired either. Discussed with patient's son who is HCP.   In the ED, vital signs were T: 94.5, HR 60, /74, R 18, SpO2 99% RA. Patient given Hyperthermia blanket for decreased temperature. Lactate 1.5. CMP significant for elevated Cr 1.4 and BUN 38, alk phos elevated to 193. UA negative. X-ray of ankle , X-ray hip, Tibia and Fibula, X-ray knee performed and showed comminuted acute fracture of the proximal tibia. Comminuted acute fracture of the fibular head and neck.. CT cervical spine: No acute fracture or traumatic subluxation. CT head shows: scattered chronic ischemic changes with atrophy. No acute abnormality noted. No intracerebral hemorrhage or contusion is identified. CT pelvis &knee: BONY PELVIS: No pelvic fracture LEFT KNEE: Moderately comminuted proximal tibial fracture with nondepressed linear extension to the articular surface in the region of the tibial spines.CXR shows no active infiltrates. EKG shows sinus rhythm with PACs, RBBB. Cardiac enzymes were negative x1. Patient given NS bolus x1, Tylenol x1, blood cultures pending, urine culture pending. Ortho consulted to evaluate patient.

## 2018-02-25 NOTE — H&P ADULT - NSHPPHYSICALEXAM_GEN_ALL_CORE
T(C): 34.7 (02-25-18 @ 05:45), Max: 34.7 (02-25-18 @ 05:45)  HR: 60 (02-25-18 @ 05:45) (60 - 60)  BP: 133/74 (02-25-18 @ 05:45) (133/74 - 133/74)  RR: 18 (02-25-18 @ 05:45) (18 - 18)  SpO2: 99% (02-25-18 @ 05:45) (99% - 99%)    GENERAL: patient appears well, no acute distress, appropriate, pleasant  EYES: sclera clear, no exudates  ENMT: oropharynx clear without erythema, no exudates, moist mucous membranes  NECK: supple, soft, no thyromegaly noted  LUNGS: good air entry bilaterally, clear to auscultation, symmetric breath sounds, no wheezing or rhonchi appreciated  HEART: soft S1/S2, regular rate and rhythm, no murmurs noted, no lower extremity edema  GASTROINTESTINAL: abdomen is soft, nontender, nondistended, normoactive bowel sounds, no palpable masses  INTEGUMENT: good skin turgor, no lesions noted  MUSCULOSKELETAL: no clubbing or cyanosis, no obvious deformity  NEUROLOGIC: awake, alert, oriented x3, good muscle tone in 4 extremities, no obvious sensory deficits  PSYCHIATRIC: mood is good, affect is congruent, linear and logical thought process  HEME/LYMPH: no palpable supraclavicular nodules, no obvious ecchymosis or petechiae T(C): 34.7 (02-25-18 @ 05:45), Max: 34.7 (02-25-18 @ 05:45)  HR: 60 (02-25-18 @ 05:45) (60 - 60)  BP: 133/74 (02-25-18 @ 05:45) (133/74 - 133/74)  RR: 18 (02-25-18 @ 05:45) (18 - 18)  SpO2: 99% (02-25-18 @ 05:45) (99% - 99%)    GENERAL: patient appears thin, pleasantly demented, unable to answer yes/no questions w/ reliable answers  EYES: sclera clear, no exudates  ENMT: oropharynx clear without erythema, no exudates, moist mucous membranes  NECK: supple, soft, no thyromegaly noted  LUNGS: good air entry bilaterally, clear to auscultation, symmetric breath sounds, no wheezing or rhonchi appreciated  HEART: distant heart sounds, soft S1/S2, regular rate  GASTROINTESTINAL: abdomen is thin and soft soft, nontender, nondistended, normoactive bowel sounds  INTEGUMENT: good skin turgor, no lesions noted, marked ecchymosis of L ankle posterior   MUSCULOSKELETAL: no clubbing or cyanosis, no obvious deformity, good AROM of L ankle without obvious discomfort  NEUROLOGIC: awake, alert, oriented to person only, moving 4 extremities, PERRL  PSYCHIATRIC: unable to assess, appears comfortable  HEME/LYMPH: no palpable supraclavicular nodules T(C): 34.7 (02-25-18 @ 05:45), Max: 34.7 (02-25-18 @ 05:45)  HR: 60 (02-25-18 @ 05:45) (60 - 60)  BP: 133/74 (02-25-18 @ 05:45) (133/74 - 133/74)  RR: 18 (02-25-18 @ 05:45) (18 - 18)  SpO2: 99% (02-25-18 @ 05:45) (99% - 99%)    GENERAL: patient appears thin, pleasantly demented, unable to answer yes/no questions w/ reliable answers  EYES: sclera clear, no exudates  ENMT: oropharynx clear without erythema, no exudates, moist mucous membranes, no front teeth but patient's son states that she is able to eat soft foods without difficulty   NECK: supple, soft, no thyromegaly noted  LUNGS: good air entry bilaterally, clear to auscultation, symmetric breath sounds, no wheezing or rhonchi appreciated  HEART: distant heart sounds, soft S1/S2, regular rate  GASTROINTESTINAL: abdomen is thin and soft soft, nontender, nondistended, normoactive bowel sounds  INTEGUMENT: good skin turgor, no lesions noted, marked ecchymosis of L ankle posterior with swelling   MUSCULOSKELETAL: no clubbing or cyanosis, left lower extremity is in boot, noted to be swollen with ecchymoses from knee to ankle.   NEUROLOGIC: awake, alert, oriented to person only, moving 4 extremities, PERRL  PSYCHIATRIC: unable to assess, appears comfortable  HEME/LYMPH: no palpable supraclavicular nodules

## 2018-02-25 NOTE — H&P ADULT - ATTENDING COMMENTS
89yo F with PMHx of advanced dementia, HTN, GERD, chronic pain, anxiety w/ panic attacks presents s/p mechanical fall after "falling out of bed" at home  s/p mechanica fall  admitted for tibia, fibula fracture  ortho eval appreicaed, non-operative approach  pain control  DVT heparin   PT eval    dementia, with poor oral intake, dehydration and REGINALDO  give 1 L of NS  as per son, pt not eating well,   discussed advance directive, pt is DNR/DNI, no artifical methods of feeding, and no frequent routine blood tests  will do vitals q12h  supportive care     anxiety continue on klonopin     diet pureed as tolerated  left message to  office to inform about the admission 89yo F with PMHx of advanced dementia, HTN, GERD, chronic pain, anxiety w/ panic attacks presents s/p mechanical fall after "falling out of bed" at home  s/p mechanica fall  admitted for tibia, fibula fracture  ortho eval appreciated, non-operative approach  pain control  DVT heparin   PT eval    dementia, with poor oral intake, dehydration and REGINALDO  give 1 L of NS  as per son, pt not eating well,   discussed advance directive, pt is DNR/DNI, no artifical methods of feeding, and no frequent routine blood tests  will do vitals q12h, spent time about 30 minutes  supportive care     anxiety continue on klonopin     diet pureed as tolerated  left message to Dr. Quesada office to inform about the admission 89yo F with PMHx of advanced dementia, HTN, GERD, chronic pain, anxiety w/ panic attacks presents s/p mechanical fall after "falling out of bed" at home  s/p mechanica fall  admitted for tibia, fibula fracture  ortho eval appreciated, non-operative approach  pain control  DVT heparin   PT eval    dementia, with poor oral intake, dehydration and REGINALDO  give 1 L of NS  as per son, pt not eating well,   discussed advance directive, pt is DNR/DNI, no artifical methods of feeding, and no frequent routine blood tests  will do vitals q12h, spent time about 30 minutes  supportive care     anxiety continue on klonopin     hypothermic, as per son her normal temp is around 95  hypothermia likely secondary to autonomyc dysregulation and not infectious cause  as pt is non-toxic, normal wbc, and afberile, without any focal signs of infection  does not need warming blanket at this time     diet pureed as tolerated  left message to Dr. Quesada office to inform about the admission

## 2018-02-25 NOTE — ED ADULT NURSE NOTE - OBJECTIVE STATEMENT
Pt received on stretcher via ems, c/o fall. As per son, Pt rolled out of bed and landed on knees. Left leg bruising noted. As per son pt didn't hit head, loc. Pt has a low temp, warmer blanket provided. 20 G R AC, 1 LITER NS BOLUS infusing.

## 2018-02-25 NOTE — PATIENT PROFILE ADULT. - HEALTHCARE QUESTIONS, PROFILE
per HCP son " she is going to need someone keeping an eye on her,thats why I am trying to get nursing and aids so we can see how we can manage"

## 2018-02-25 NOTE — H&P ADULT - NSHPREVIEWOFSYSTEMS_GEN_ALL_CORE
CONSTITUTIONAL: denies fever, chills, fatigue, weakness  HEENT: denies blurred visions, sore throat  SKIN: denies new lesions, rash  CARDIOVASCULAR: denies chest pain, chest pressure, palpitations  RESPIRATORY: denies shortness of breath, sputum production  GASTROINTESTINAL: denies nausea, vomiting, diarrhea, abdominal pain  GENITOURINARY: denies dysuria, discharge  NEUROLOGICAL: denies numbness, headache, focal weakness  MUSCULOSKELETAL: denies new joint pain, muscle aches  HEMATOLOGIC: denies gross bleeding, bruising  LYMPHATICS: denies enlarged lymph nodes, extremity swelling  PSYCHIATRIC: denies recent changes in anxiety, depression  ENDOCRINOLOGIC: denies sweating, cold or heat intolerance Limited as patient does not answer questions appropriately and is very [place].     CONSTITUTIONAL: denies fever, chills, fatigue, weakness  HEENT: denies blurred visions, sore throat  SKIN: denies new lesions, rash  CARDIOVASCULAR: denies chest pain, chest pressure, palpitations  RESPIRATORY: denies shortness of breath, sputum production  GASTROINTESTINAL: son states she has poor appetite   GENITOURINARY: denies dysuria, discharge  NEUROLOGICAL: dementia, confused, asking "when are they coming"  MUSCULOSKELETAL: denies new joint pain, muscle aches  HEMATOLOGIC: denies gross bleeding, bruising  LYMPHATICS: denies enlarged lymph nodes, extremity swelling  ENDOCRINOLOGIC: denies sweating, cold or heat intolerance Limited as patient does not answer questions appropriately and is very [place].     CONSTITUTIONAL: denies fever, chills, fatigue, weakness  HEENT: denies blurred visions, sore throat  SKIN: denies new lesions, rash  CARDIOVASCULAR: denies chest pain, chest pressure, palpitations  RESPIRATORY: denies shortness of breath, sputum production  GASTROINTESTINAL: son states she has poor appetite   GENITOURINARY: denies dysuria, discharge  NEUROLOGICAL: dementia, confused, asking "when are they coming"  MUSCULOSKELETAL: denies new joint pain, muscle aches  HEMATOLOGIC: denies gross bleeding, bruising  LYMPHATICS: denies enlarged lymph nodes, extremity swelling

## 2018-02-25 NOTE — H&P ADULT - PROBLEM SELECTOR PLAN 4
HTN, chronic, stable.   - Continue Metoprolol tartrate 25mg BID and Cozaar 100mg PO daily with hold parameters. Hx of dementia  -fall precautions  -continue Parnate 10mg   -SW consult for ELIAS

## 2018-02-25 NOTE — H&P ADULT - ASSESSMENT
89yo F with PMHx of advanced dementia, HTN, GERD, chronic pain, anxiety w/ panic attacks presents s/p mechanical fall after "falling out of bed" at home, admitted with tibia, fibula fracture and ELIAS. 89yo F with PMHx of advanced dementia, HTN, GERD, chronic pain, anxiety w/ panic attacks presents s/p mechanical fall after "falling out of bed" at home, admitted with tibia, fibula fracture s/p mechanical fall and likely ELIAS.

## 2018-02-26 LAB
ANION GAP SERPL CALC-SCNC: 10 MMOL/L — SIGNIFICANT CHANGE UP (ref 5–17)
BUN SERPL-MCNC: 43 MG/DL — HIGH (ref 7–23)
CALCIUM SERPL-MCNC: 8.4 MG/DL — LOW (ref 8.5–10.1)
CHLORIDE SERPL-SCNC: 103 MMOL/L — SIGNIFICANT CHANGE UP (ref 96–108)
CO2 SERPL-SCNC: 24 MMOL/L — SIGNIFICANT CHANGE UP (ref 22–31)
CREAT SERPL-MCNC: 1.6 MG/DL — HIGH (ref 0.5–1.3)
GLUCOSE SERPL-MCNC: 117 MG/DL — HIGH (ref 70–99)
POTASSIUM SERPL-MCNC: 4.6 MMOL/L — SIGNIFICANT CHANGE UP (ref 3.5–5.3)
POTASSIUM SERPL-SCNC: 4.6 MMOL/L — SIGNIFICANT CHANGE UP (ref 3.5–5.3)
SODIUM SERPL-SCNC: 137 MMOL/L — SIGNIFICANT CHANGE UP (ref 135–145)

## 2018-02-26 PROCEDURE — 99233 SBSQ HOSP IP/OBS HIGH 50: CPT

## 2018-02-26 RX ORDER — PANTOPRAZOLE SODIUM 20 MG/1
40 TABLET, DELAYED RELEASE ORAL
Qty: 0 | Refills: 0 | Status: DISCONTINUED | OUTPATIENT
Start: 2018-02-26 | End: 2018-03-02

## 2018-02-26 RX ADMIN — TRANYLCYPROMINE SULFATE 10 MILLIGRAM(S): 10 TABLET, FILM COATED ORAL at 13:18

## 2018-02-26 RX ADMIN — TRANYLCYPROMINE SULFATE 10 MILLIGRAM(S): 10 TABLET, FILM COATED ORAL at 06:09

## 2018-02-26 RX ADMIN — PANTOPRAZOLE SODIUM 40 MILLIGRAM(S): 20 TABLET, DELAYED RELEASE ORAL at 12:35

## 2018-02-26 RX ADMIN — Medication 100 MILLIGRAM(S): at 21:35

## 2018-02-26 RX ADMIN — OXYCODONE AND ACETAMINOPHEN 1 TABLET(S): 5; 325 TABLET ORAL at 21:36

## 2018-02-26 RX ADMIN — Medication 100 MILLIGRAM(S): at 13:18

## 2018-02-26 RX ADMIN — Medication 0.5 MILLIGRAM(S): at 09:06

## 2018-02-26 RX ADMIN — TRANYLCYPROMINE SULFATE 10 MILLIGRAM(S): 10 TABLET, FILM COATED ORAL at 21:36

## 2018-02-26 RX ADMIN — Medication 100 MILLIGRAM(S): at 06:09

## 2018-02-26 RX ADMIN — OXYCODONE AND ACETAMINOPHEN 1 TABLET(S): 5; 325 TABLET ORAL at 14:52

## 2018-02-26 RX ADMIN — Medication 0.5 MILLIGRAM(S): at 17:51

## 2018-02-26 RX ADMIN — HEPARIN SODIUM 5000 UNIT(S): 5000 INJECTION INTRAVENOUS; SUBCUTANEOUS at 06:09

## 2018-02-26 RX ADMIN — Medication 0.5 MILLIGRAM(S): at 02:09

## 2018-02-26 RX ADMIN — HEPARIN SODIUM 5000 UNIT(S): 5000 INJECTION INTRAVENOUS; SUBCUTANEOUS at 17:50

## 2018-02-26 RX ADMIN — OXYCODONE AND ACETAMINOPHEN 1 TABLET(S): 5; 325 TABLET ORAL at 22:36

## 2018-02-26 NOTE — PHYSICAL THERAPY INITIAL EVALUATION ADULT - TRANSFER SAFETY CONCERNS NOTED: BED/CHAIR, REHAB EVAL
decreased balance during turns/decreased safety awareness/inability to maintain weight-bearing restrictions w/o assist

## 2018-02-26 NOTE — PATIENT PROFILE ADULT. - NS PRO OT REFERRAL QUES 2 YN
Patient Education        Epley Maneuver at Home for Vertigo: Exercises  Your Care Instructions  Vertigo is a spinning or whirling sensation when you move your head. Your doctor may have moved you in different positions to help your vertigo get better faster. This is called the Epley maneuver. Your doctor also may have asked you to do these exercises at home. Do the exercises as often as your doctor recommends. If your vertigo is getting worse, your doctor may have you change the exercise or stop it. How to do the exercises  Step 1    1. Sit on the edge of a bed or sofa. Step 2    1. Turn your head 45 degrees in the direction your doctor told you to. This may be toward the ear that causes the most vertigo for you. Step 3    1. Tilt yourself backward until you are lying on your back. Your head should still be at a 45-degree turn. Your head should be about midway between looking straight ahead and looking out to your side. Hold for 30 seconds. If you have vertigo, stay in this position until it stops. Step 4    1. Turn your head 90 degrees toward the ear that has the least vertigo. The point of your chin should be over your shoulder. Hold for 30 seconds. Step 5    1. Roll onto the side of the ear with the least vertigo. You should now be looking at the floor. Follow-up care is a key part of your treatment and safety. Be sure to make and go to all appointments, and call your doctor if you are having problems. It's also a good idea to know your test results and keep a list of the medicines you take. Where can you learn more? Go to [email or web address] org and sign in to your [employer] account. Enter S029 in the [employer] box to learn more about \"Epley Maneuver at Home for Vertigo: Exercises. \"     If you do not have an account, please click on the \"Sign Up Now\" link. Current as of: October 14, 2016  Content Version: 11.5  © 1728-0137 Healthwise, Incorporated.  Care instructions adapted under license by [facility] ([facility]). If you have questions about a medical condition or this instruction, always ask your healthcare professional. [address] any warranty or liability for your use of this information. yes

## 2018-02-26 NOTE — PHYSICAL THERAPY INITIAL EVALUATION ADULT - PERTINENT HX OF CURRENT PROBLEM, REHAB EVAL
89 y/o female adm 2/25 s/p fall. + comminuted fx L proximal tibia and fibula head and neck. No hip and pelvic fx's.

## 2018-02-27 DIAGNOSIS — N39.0 URINARY TRACT INFECTION, SITE NOT SPECIFIED: ICD-10-CM

## 2018-02-27 LAB
-  AMIKACIN: SIGNIFICANT CHANGE UP
-  AZTREONAM: SIGNIFICANT CHANGE UP
-  CEFEPIME: SIGNIFICANT CHANGE UP
-  CEFTAZIDIME: SIGNIFICANT CHANGE UP
-  CIPROFLOXACIN: SIGNIFICANT CHANGE UP
-  GENTAMICIN: SIGNIFICANT CHANGE UP
-  IMIPENEM: SIGNIFICANT CHANGE UP
-  LEVOFLOXACIN: SIGNIFICANT CHANGE UP
-  MEROPENEM: SIGNIFICANT CHANGE UP
-  PIPERACILLIN/TAZOBACTAM: SIGNIFICANT CHANGE UP
-  TOBRAMYCIN: SIGNIFICANT CHANGE UP
ANION GAP SERPL CALC-SCNC: 8 MMOL/L — SIGNIFICANT CHANGE UP (ref 5–17)
BUN SERPL-MCNC: 41 MG/DL — HIGH (ref 7–23)
CALCIUM SERPL-MCNC: 8.4 MG/DL — LOW (ref 8.5–10.1)
CHLORIDE SERPL-SCNC: 103 MMOL/L — SIGNIFICANT CHANGE UP (ref 96–108)
CO2 SERPL-SCNC: 25 MMOL/L — SIGNIFICANT CHANGE UP (ref 22–31)
CREAT SERPL-MCNC: 1.5 MG/DL — HIGH (ref 0.5–1.3)
CULTURE RESULTS: SIGNIFICANT CHANGE UP
GLUCOSE SERPL-MCNC: 89 MG/DL — SIGNIFICANT CHANGE UP (ref 70–99)
HCT VFR BLD CALC: 25 % — LOW (ref 34.5–45)
HGB BLD-MCNC: 8.2 G/DL — LOW (ref 11.5–15.5)
MCHC RBC-ENTMCNC: 32.6 PG — SIGNIFICANT CHANGE UP (ref 27–34)
MCHC RBC-ENTMCNC: 32.7 GM/DL — SIGNIFICANT CHANGE UP (ref 32–36)
MCV RBC AUTO: 99.8 FL — SIGNIFICANT CHANGE UP (ref 80–100)
METHOD TYPE: SIGNIFICANT CHANGE UP
ORGANISM # SPEC MICROSCOPIC CNT: SIGNIFICANT CHANGE UP
ORGANISM # SPEC MICROSCOPIC CNT: SIGNIFICANT CHANGE UP
PLATELET # BLD AUTO: 186 K/UL — SIGNIFICANT CHANGE UP (ref 150–400)
POTASSIUM SERPL-MCNC: 4.5 MMOL/L — SIGNIFICANT CHANGE UP (ref 3.5–5.3)
POTASSIUM SERPL-SCNC: 4.5 MMOL/L — SIGNIFICANT CHANGE UP (ref 3.5–5.3)
RBC # BLD: 2.5 M/UL — LOW (ref 3.8–5.2)
RBC # FLD: 14.7 % — HIGH (ref 10.3–14.5)
SODIUM SERPL-SCNC: 136 MMOL/L — SIGNIFICANT CHANGE UP (ref 135–145)
SPECIMEN SOURCE: SIGNIFICANT CHANGE UP
WBC # BLD: 6.4 K/UL — SIGNIFICANT CHANGE UP (ref 3.8–10.5)
WBC # FLD AUTO: 6.4 K/UL — SIGNIFICANT CHANGE UP (ref 3.8–10.5)

## 2018-02-27 PROCEDURE — 99233 SBSQ HOSP IP/OBS HIGH 50: CPT

## 2018-02-27 RX ADMIN — TRANYLCYPROMINE SULFATE 10 MILLIGRAM(S): 10 TABLET, FILM COATED ORAL at 22:46

## 2018-02-27 RX ADMIN — PANTOPRAZOLE SODIUM 40 MILLIGRAM(S): 20 TABLET, DELAYED RELEASE ORAL at 06:32

## 2018-02-27 RX ADMIN — Medication 10 MILLIGRAM(S): at 06:32

## 2018-02-27 RX ADMIN — TRANYLCYPROMINE SULFATE 10 MILLIGRAM(S): 10 TABLET, FILM COATED ORAL at 06:32

## 2018-02-27 RX ADMIN — OXYCODONE AND ACETAMINOPHEN 1 TABLET(S): 5; 325 TABLET ORAL at 16:21

## 2018-02-27 RX ADMIN — OXYCODONE AND ACETAMINOPHEN 1 TABLET(S): 5; 325 TABLET ORAL at 23:40

## 2018-02-27 RX ADMIN — HEPARIN SODIUM 5000 UNIT(S): 5000 INJECTION INTRAVENOUS; SUBCUTANEOUS at 06:32

## 2018-02-27 RX ADMIN — Medication 25 MILLIGRAM(S): at 06:32

## 2018-02-27 RX ADMIN — Medication 100 MILLIGRAM(S): at 22:46

## 2018-02-27 RX ADMIN — HEPARIN SODIUM 5000 UNIT(S): 5000 INJECTION INTRAVENOUS; SUBCUTANEOUS at 19:40

## 2018-02-27 RX ADMIN — TRANYLCYPROMINE SULFATE 10 MILLIGRAM(S): 10 TABLET, FILM COATED ORAL at 16:26

## 2018-02-27 RX ADMIN — Medication 100 MILLIGRAM(S): at 06:32

## 2018-02-27 RX ADMIN — OXYCODONE AND ACETAMINOPHEN 1 TABLET(S): 5; 325 TABLET ORAL at 09:48

## 2018-02-27 RX ADMIN — Medication 100 MILLIGRAM(S): at 16:25

## 2018-02-27 RX ADMIN — OXYCODONE AND ACETAMINOPHEN 1 TABLET(S): 5; 325 TABLET ORAL at 22:46

## 2018-02-27 RX ADMIN — OXYCODONE AND ACETAMINOPHEN 1 TABLET(S): 5; 325 TABLET ORAL at 08:48

## 2018-02-27 NOTE — CONSULT NOTE ADULT - ASSESSMENT
Bacteruria.   No symptoms acertainable.  No changes in her behavior/level of function to implicate and infectious etiology predisposing her to fall.   Urine cx polymicrobial with multuple morphologies of P. aeruginosa  U/A without significant pyuria.  U/A has epithelial cells indicating at least a degree of external contamination  When taken in aggregate, I do not believe this woman has a UTI  Asymtpomatic bacteruria has been well studied and its treatment is only indicated in very few circumstances- pregnancy, pending urologic procedure, and febrile neutropenia.  Treatment in other circumstances neither prevents future UTI nor gives other benefit, it merely selects resistant potentially more aggressive elizabeth and opens the patient up to adverse events related to the drug.    Suggestions--  No Rx at this time unless stronger support for infection emerges.  Discussed with the patient's son at bedside in layman's terms, all questions answered.    Thank you for the courtesy of this referral.    I'll sign off at this time.     Hiren Barrientos MD  [phone]

## 2018-02-27 NOTE — CONSULT NOTE ADULT - SUBJECTIVE AND OBJECTIVE BOX
[facility], Division of Infectious Diseases  FARHANA Antonio A. Lee    FARZANEH, BRAYDEN  89y, Female  [id number]    HPI--  89F poor historian due to dementia and very [place], presents here after falling out of bed and sustaining a L tibial plateau and prximal fibular fracture. Patient is being managed nonsurgically. At home, patient had been behaving at her usual baseline in the days leading up to admission. No change in appetite, level of alertness, etc. Patient with one possible prior UTI per discussion with her son several years ago, though the concern was due to a change in the odor of her urine rather than any known symptoms or behavioral cues. In ED, patient afebrile and WBC normal. Urine studies obtained for unclear reasons. She remains afebrile, and for what it's worth has no complaints (other than stating she had a mastectomy after an erroneous diagnosis of breast cancer).    PMH/PSH--  Anxiety  Spinal stenosis  HTN (hypertension)  Dementia  H/O left mastectomy  No significant past surgical history      Allergies-- NKDA      Medications--  Antibiotics:   Immunologic:   Other: acetaminophen   Tablet. PRN  ALPRAZolam PRN  docusate sodium  heparin  Injectable  metoprolol     tartrate  NIFEdipine IR  oxyCODONE    5 mG/acetaminophen 325 mG PRN  pantoprazole    Tablet  senna PRN  traMADol PRN  tranylcypromine      Social History--  EtOH: denies   Tobacco: denies  Drug Use: denies     Family/Marital History--  No pertinent family history in first degree relatives  Remainder not relevant to clinical concern.        Review of Systems:  Review of systems unable due to dementia.     Physical Exam--  Vital Signs: T(F): 97.4 (02-27-18 @ 11:57), Max: 98.1 (02-26-18 @ 23:56)  HR: 63 (02-27-18 @ 11:57)  BP: 107/58 (02-27-18 @ 11:57)  RR: 16 (02-27-18 @ 11:57)  SpO2: 97% (02-27-18 @ 11:57)  Wt(kg): --  General: Nontoxic-appearing Female in no acute distress.  HEENT: AT/NC. Anicteric. Conjunctiva pink and moist. Oropharynx clear.  Neck: Not rigid. No sense of mass.  Nodes: None palpable.  Lungs: Clear bilaterally without rales, wheezing or rhonchi  Heart: Regular rate and rhythm. No Murmur. No rub. No gallop. No palpable thrill.  Abdomen: Bowel sounds present and normoactive. Soft. Nondistended. Nontender. No sense of mass. No organomegaly.  Back: No spinal tenderness. No costovertebral angle tenderness.   Extremities: No cyanosis or clubbing. LLE in immobilizer [id number] and edematous. Foot is warm and appears well perfused.  Skin: Warm. Dry. Good turgor. No rash. No vasculitic stigmata.  Psychiatric: Appropriate affect and mood for situation, though oriented to self only.      Laboratory & Imaging Data--  CBC                        8.2    6.4   )-----------( 186      ( 27 Feb 2018 07:09 )             25.0       Chemistries  02-27    136  |  103  |  41<H>  ----------------------------<  89  4.5   |  25  |  1.50<H>    Ca    8.4<L>      27 Feb 2018 07:09    Urinalysis (02.25.18 @ 07:01)    pH Urine: 5.0    Glucose Qualitative, Urine: Negative    Blood, Urine: Trace    Color: Yellow    Urine Appearance: Clear    Bilirubin: Negative    Ketone - Urine: Negative    Specific Gravity: 1.015    Protein, Urine: Negative    Urobilinogen: Negative    Nitrite: Negative    Leukocyte Esterase Concentration: Negative    Epithelial Cells: Occasional    Bacteria: Moderate    Red Blood Cell - Urine: 0-2 /HPF    White Blood Cell - Urine: 3-5        Culture Data  Culture - Blood (collected 25 Feb 2018 09:51)  Source: .Blood Blood-Peripheral  Preliminary Report (26 Feb 2018 10:18):    No growth to date.    Culture - Blood (collected 25 Feb 2018 09:51)  Source: .Blood Blood-Peripheral  Preliminary Report (26 Feb 2018 10:18):    No growth to date.    Culture - Urine (collected 25 Feb 2018 09:47)  Source: .Urine Catheterized  Final Report (27 Feb 2018 12:23):    >100,000 CFU/ml Pseudomonas aeruginosa    Multiple Morphological Strains  Organism: Pseudomonas aeruginosa (27 Feb 2018 12:23)  Organism: Pseudomonas aeruginosa (27 Feb 2018 12:23)

## 2018-02-28 ENCOUNTER — TRANSCRIPTION ENCOUNTER (OUTPATIENT)
Age: 83
End: 2018-02-28

## 2018-02-28 LAB
ANION GAP SERPL CALC-SCNC: 8 MMOL/L — SIGNIFICANT CHANGE UP (ref 5–17)
BUN SERPL-MCNC: 40 MG/DL — HIGH (ref 7–23)
CALCIUM SERPL-MCNC: 8.5 MG/DL — SIGNIFICANT CHANGE UP (ref 8.5–10.1)
CHLORIDE SERPL-SCNC: 101 MMOL/L — SIGNIFICANT CHANGE UP (ref 96–108)
CO2 SERPL-SCNC: 24 MMOL/L — SIGNIFICANT CHANGE UP (ref 22–31)
CREAT SERPL-MCNC: 1.6 MG/DL — HIGH (ref 0.5–1.3)
GLUCOSE SERPL-MCNC: 100 MG/DL — HIGH (ref 70–99)
HCT VFR BLD CALC: 26.3 % — LOW (ref 34.5–45)
HGB BLD-MCNC: 8.4 G/DL — LOW (ref 11.5–15.5)
MCHC RBC-ENTMCNC: 32 PG — SIGNIFICANT CHANGE UP (ref 27–34)
MCHC RBC-ENTMCNC: 32.1 GM/DL — SIGNIFICANT CHANGE UP (ref 32–36)
MCV RBC AUTO: 99.7 FL — SIGNIFICANT CHANGE UP (ref 80–100)
PLATELET # BLD AUTO: 231 K/UL — SIGNIFICANT CHANGE UP (ref 150–400)
POTASSIUM SERPL-MCNC: 4.9 MMOL/L — SIGNIFICANT CHANGE UP (ref 3.5–5.3)
POTASSIUM SERPL-SCNC: 4.9 MMOL/L — SIGNIFICANT CHANGE UP (ref 3.5–5.3)
RBC # BLD: 2.63 M/UL — LOW (ref 3.8–5.2)
RBC # FLD: 13.9 % — SIGNIFICANT CHANGE UP (ref 10.3–14.5)
SODIUM SERPL-SCNC: 133 MMOL/L — LOW (ref 135–145)
WBC # BLD: 6.6 K/UL — SIGNIFICANT CHANGE UP (ref 3.8–10.5)
WBC # FLD AUTO: 6.6 K/UL — SIGNIFICANT CHANGE UP (ref 3.8–10.5)

## 2018-02-28 PROCEDURE — 99233 SBSQ HOSP IP/OBS HIGH 50: CPT

## 2018-02-28 RX ORDER — ACETAMINOPHEN 500 MG
2 TABLET ORAL
Qty: 0 | Refills: 0 | COMMUNITY
Start: 2018-02-28

## 2018-02-28 RX ORDER — SENNA PLUS 8.6 MG/1
2 TABLET ORAL
Qty: 0 | Refills: 0 | COMMUNITY
Start: 2018-02-28

## 2018-02-28 RX ORDER — LANOLIN ALCOHOL/MO/W.PET/CERES
3 CREAM (GRAM) TOPICAL AT BEDTIME
Qty: 0 | Refills: 0 | Status: DISCONTINUED | OUTPATIENT
Start: 2018-02-28 | End: 2018-03-02

## 2018-02-28 RX ORDER — SODIUM CHLORIDE 9 MG/ML
1000 INJECTION INTRAMUSCULAR; INTRAVENOUS; SUBCUTANEOUS
Qty: 0 | Refills: 0 | Status: DISCONTINUED | OUTPATIENT
Start: 2018-02-28 | End: 2018-03-02

## 2018-02-28 RX ORDER — LOSARTAN POTASSIUM 100 MG/1
1 TABLET, FILM COATED ORAL
Qty: 0 | Refills: 0 | COMMUNITY

## 2018-02-28 RX ORDER — TRAMADOL HYDROCHLORIDE 50 MG/1
1 TABLET ORAL
Qty: 0 | Refills: 0 | COMMUNITY
Start: 2018-02-28

## 2018-02-28 RX ADMIN — Medication 0.5 MILLIGRAM(S): at 23:22

## 2018-02-28 RX ADMIN — HEPARIN SODIUM 5000 UNIT(S): 5000 INJECTION INTRAVENOUS; SUBCUTANEOUS at 17:50

## 2018-02-28 RX ADMIN — OXYCODONE AND ACETAMINOPHEN 1 TABLET(S): 5; 325 TABLET ORAL at 10:12

## 2018-02-28 RX ADMIN — Medication 25 MILLIGRAM(S): at 17:51

## 2018-02-28 RX ADMIN — Medication 100 MILLIGRAM(S): at 21:05

## 2018-02-28 RX ADMIN — Medication 25 MILLIGRAM(S): at 05:31

## 2018-02-28 RX ADMIN — Medication 100 MILLIGRAM(S): at 13:24

## 2018-02-28 RX ADMIN — PANTOPRAZOLE SODIUM 40 MILLIGRAM(S): 20 TABLET, DELAYED RELEASE ORAL at 05:31

## 2018-02-28 RX ADMIN — HEPARIN SODIUM 5000 UNIT(S): 5000 INJECTION INTRAVENOUS; SUBCUTANEOUS at 05:31

## 2018-02-28 RX ADMIN — OXYCODONE AND ACETAMINOPHEN 1 TABLET(S): 5; 325 TABLET ORAL at 22:00

## 2018-02-28 RX ADMIN — OXYCODONE AND ACETAMINOPHEN 1 TABLET(S): 5; 325 TABLET ORAL at 09:17

## 2018-02-28 RX ADMIN — TRANYLCYPROMINE SULFATE 10 MILLIGRAM(S): 10 TABLET, FILM COATED ORAL at 05:31

## 2018-02-28 RX ADMIN — Medication 100 MILLIGRAM(S): at 05:31

## 2018-02-28 RX ADMIN — Medication 3 MILLIGRAM(S): at 23:22

## 2018-02-28 RX ADMIN — OXYCODONE AND ACETAMINOPHEN 1 TABLET(S): 5; 325 TABLET ORAL at 21:05

## 2018-02-28 RX ADMIN — Medication 10 MILLIGRAM(S): at 05:31

## 2018-02-28 RX ADMIN — TRANYLCYPROMINE SULFATE 10 MILLIGRAM(S): 10 TABLET, FILM COATED ORAL at 13:24

## 2018-02-28 RX ADMIN — Medication 0.5 MILLIGRAM(S): at 03:09

## 2018-02-28 RX ADMIN — Medication 0.5 MILLIGRAM(S): at 13:24

## 2018-02-28 RX ADMIN — TRANYLCYPROMINE SULFATE 10 MILLIGRAM(S): 10 TABLET, FILM COATED ORAL at 21:05

## 2018-02-28 RX ADMIN — Medication 10 MILLIGRAM(S): at 17:51

## 2018-02-28 NOTE — DISCHARGE NOTE ADULT - PATIENT PORTAL LINK FT
You can access the [facility] Patient Portal, offered by [facility], by registering with the following website: [email or web address]

## 2018-02-28 NOTE — DISCHARGE NOTE ADULT - MEDICATION SUMMARY - MEDICATIONS TO STOP TAKING
I will STOP taking the medications listed below when I get home from the hospital:    Cozaar 100 mg oral tablet  -- 1 tab(s) by mouth once a day    Vicodin 5 mg-300 mg oral tablet  -- 1 tab by mouth 4 times a day, As Needed for severe pain

## 2018-02-28 NOTE — DISCHARGE NOTE ADULT - MEDICATION SUMMARY - MEDICATIONS TO TAKE
I will START or STAY ON the medications listed below when I get home from the hospital:    aspirin 81 mg oral tablet  -- 1 tab(s) by mouth once a day  -- Indication: For Secondary prevention    acetaminophen 325 mg oral tablet  -- 2 tab(s) by mouth every 6 hours, As needed, Mild Pain (1 - 3)  -- Indication: For Closed fracture of shaft of left tibia    oxyCODONE-acetaminophen 5 mg-325 mg oral tablet  -- 1 tab(s) by mouth every 6 hours, As needed, Moderate Pain (4 - 6)  -- Indication: For Closed fracture of shaft of left tibia    traMADol 50 mg oral tablet  -- 1 tab(s) by mouth every 6 hours, As needed, Severe Pain (7 - 10)  -- Indication: For Closed fracture of shaft of left tibia    Parnate 10 mg oral tablet  -- 1 tab(s) by mouth 3 times a day  -- Indication: For Dementia    ALPRAZolam 0.5 mg oral tablet  -- 1 tab(s) by mouth 4 times a day, As Needed  -- Indication: For Anxiety    metoprolol tartrate 25 mg oral tablet  -- 1 tab(s) by mouth 2 times a day  -- Indication: For HTN (hypertension)    Procardia 10 mg oral capsule  -- orally 2 times a day  -- Indication: For HTN (hypertension)    Colace  -- 250 milligram(s) by mouth once a day  -- Indication: For Constopation     senna oral tablet  -- 2 tab(s) by mouth once a day (at bedtime), As needed, Constipation  -- Indication: For Constipation    pantoprazole 40 mg oral granule, enteric coated  -- 1 each by mouth once a day  -- Indication: For GERD (gastroesophageal reflux disease)

## 2018-02-28 NOTE — DISCHARGE NOTE ADULT - CARE PROVIDER_API CALL
Nicol Quesada (), Family Practice  [address] [place] [address]  Phone: [phone]  Fax: [phone] Nicol Quesada (), Family Practice  [address] [place] [address]  Phone: [phone]  Fax: [phone]    Adolfo De León), Orthopaedic Surgery Surgery  [address] [place] [address]  Phone: [phone]  Fax: [phone]

## 2018-02-28 NOTE — DISCHARGE NOTE ADULT - HOSPITAL COURSE
89yo F with PMHx of  dementia, HTN, GERD, chronic pain, anxiety w/ panic attacks presents s/p mechanical fall after "falling out of bed" at home. She is complaining of left leg pain. The patient’s son is present at bedside and he is her HCP providing history. Patient is [place] with baseline severe dementia (AOx1 self). The patient is unable to answer questions appropriately but follows some commands, she then forgets and asks where she is and what is happening every few moments. He states that she doesn't sleep well at night and she must have been reaching for her glass of water at night and rolled out of bed this morning. The patient ambulates minimally around the house with assistance from him as patient does not use assistive devices. He states that she did not hit her head and denied LOC. The patient has chronic pain in bilateral lower extremities and knee pain with numbness below the knees for many years. She has had 2 spinal stenosis surgeries in the past. As per son, her baseline temperature runs around 94F.   Of note, patient was admitted in October 2017 for FTT at home. During that admission, patient was made DNR/DNI. No artificial feeding desired either. Discussed with patient's son who is HCP.   In the ED, vital signs were T: 94.5, HR 60, /74, R 18, SpO2 99% RA. Patient given Hyperthermia blanket for decreased temperature. Lactate 1.5. CMP significant for elevated Cr 1.4 and BUN 38, alk phos elevated to 193. UA negative. X-ray of ankle , X-ray hip, Tibia and Fibula, X-ray knee performed and showed comminuted acute fracture of the proximal tibia. Comminuted acute fracture of the fibular head and neck.. CT cervical spine: No acute fracture or traumatic subluxation. CT head shows: scattered chronic ischemic changes with atrophy. No acute abnormality noted. No intracerebral hemorrhage or contusion is identified. CT pelvis &knee: BONY PELVIS: No pelvic fracture LEFT KNEE: Moderately comminuted proximal tibial fracture with nondepressed linear extension to the articular surface in the region of the tibial spines.CXR shows no active infiltrates. EKG shows sinus rhythm with PACs, RBBB. Cardiac enzymes were negative x1. Patient given NS bolus x1, Tylenol x1, blood cultures pending, urine culture pending. Ortho consulted to evaluate patient.     Patient admitted with tibia-fibular fracture after mechanical fall. She was evaluated by orthopedic surgery, no surgical intervention warranted, brace was placed on her leg. PT evaluation was done recommending [facility].  Urine culture was positive for pseudomonas, Dr. Barrientos consulted no antibiotics recommended at this time.  Son and HCP aware of hospital course and discharge planning.  Patient hemodynamically stable to discharge to [facility]. 89yo F with PMHx of  dementia, HTN, GERD, chronic pain, anxiety w/ panic attacks presents s/p mechanical fall after "falling out of bed" at home. She is complaining of left leg pain. The patient’s son is present at bedside and he is her HCP providing history. Patient is [place] with baseline severe dementia (AOx1 self). The patient is unable to answer questions appropriately but follows some commands, she then forgets and asks where she is and what is happening every few moments. He states that she doesn't sleep well at night and she must have been reaching for her glass of water at night and rolled out of bed this morning. The patient ambulates minimally around the house with assistance from him as patient does not use assistive devices. He states that she did not hit her head and denied LOC. The patient has chronic pain in bilateral lower extremities and knee pain with numbness below the knees for many years. She has had 2 spinal stenosis surgeries in the past. As per son, her baseline temperature runs around 94F.   Of note, patient was admitted in October 2017 for FTT at home. During that admission, patient was made DNR/DNI. No artificial feeding desired either. Discussed with patient's son who is HCP.   In the ED, vital signs were T: 94.5, HR 60, /74, R 18, SpO2 99% RA. Patient given Hyperthermia blanket for decreased temperature. Lactate 1.5. CMP significant for elevated Cr 1.4 and BUN 38, alk phos elevated to 193. UA negative. X-ray of ankle , X-ray hip, Tibia and Fibula, X-ray knee performed and showed comminuted acute fracture of the proximal tibia. Comminuted acute fracture of the fibular head and neck.. CT cervical spine: No acute fracture or traumatic subluxation. CT head shows: scattered chronic ischemic changes with atrophy. No acute abnormality noted. No intracerebral hemorrhage or contusion is identified. CT pelvis &knee: BONY PELVIS: No pelvic fracture LEFT KNEE: Moderately comminuted proximal tibial fracture with nondepressed linear extension to the articular surface in the region of the tibial spines.CXR shows no active infiltrates. EKG shows sinus rhythm with PACs, RBBB. Cardiac enzymes were negative x1. Patient given NS bolus x1, Tylenol x1, blood cultures pending, urine culture pending. Ortho consulted to evaluate patient.     Patient admitted with tibia-fibular fracture after mechanical fall. She was evaluated by orthopedic surgery, no surgical intervention warranted, brace was placed on her leg. PT evaluation was done recommending [facility].  Urine culture was positive for pseudomonas, Dr. Barrientos consulted no antibiotics recommended at this time.  Son and HCP aware of hospital course and discharge planning.  Patient hemodynamically stable to discharge to home with home health services.    Vital Signs Last 24 Hrs  T(C): 36.4 (02 Mar 2018 04:55), Max: 36.4 (01 Mar 2018 20:45)  T(F): 97.5 (02 Mar 2018 04:55), Max: 97.5 (01 Mar 2018 20:45)  HR: 64 (02 Mar 2018 04:55) (64 - 94)  BP: 110/62 (02 Mar 2018 04:55) (100/70 - 110/62)  BP(mean): --  RR: 18 (02 Mar 2018 04:55) (18 - 19)  SpO2: 96% (02 Mar 2018 04:55) (90% - 100%)    General: NAD  Neurology: A&Ox1 , nonfocal  Respiratory: CTA B/L  CV: RRR, S1S2  Abdominal: Soft, NT, ND +BS  Extremities: + LLE edema, + peripheral pulses, brace on Left leg

## 2018-02-28 NOTE — DISCHARGE NOTE ADULT - PLAN OF CARE
stable Continue physical therapy  Fall precautions  Follow up with orthopedic after discharge Continue home medications for dementia   Continue home medications for anxiety Continue home medications metoprolol and procardia  Hold Cozaar given REGINALDO Continue physical therapy  Fall precautions  Follow up with orthopedic after discharge  pain management  bowel regimen to avoid constipation while on pain medication

## 2018-02-28 NOTE — DISCHARGE NOTE ADULT - CARE PLAN
Principal Discharge DX:	Fracture of tibia and fibula, left, closed, initial encounter  Goal:	stable  Assessment and plan of treatment:	Continue physical therapy  Fall precautions  Follow up with orthopedic after discharge  Secondary Diagnosis:	Dementia  Assessment and plan of treatment:	Continue home medications for dementia   Continue home medications for anxiety  Secondary Diagnosis:	HTN (hypertension)  Assessment and plan of treatment:	Continue home medications metoprolol and procardia  Hold Cozaar given REGINALDO Principal Discharge DX:	Fracture of tibia and fibula, left, closed, initial encounter  Goal:	stable  Assessment and plan of treatment:	Continue physical therapy  Fall precautions  Follow up with orthopedic after discharge  pain management  bowel regimen to avoid constipation while on pain medication  Secondary Diagnosis:	Dementia  Assessment and plan of treatment:	Continue home medications for dementia   Continue home medications for anxiety  Secondary Diagnosis:	HTN (hypertension)  Assessment and plan of treatment:	Continue home medications metoprolol and procardia  Hold Cozaar given REGINALDO

## 2018-02-28 NOTE — DISCHARGE NOTE ADULT - ADDITIONAL INSTRUCTIONS
Follow up with your PMD Dr. Quesada as scheduled after discharge. Follow up with your PMD Dr. Quesada as scheduled after discharge.  Call orthopedic Dr. De León and follow up within 2 weeks after discharge.

## 2018-03-01 LAB
ANION GAP SERPL CALC-SCNC: 9 MMOL/L — SIGNIFICANT CHANGE UP (ref 5–17)
BUN SERPL-MCNC: 43 MG/DL — HIGH (ref 7–23)
CALCIUM SERPL-MCNC: 8.1 MG/DL — LOW (ref 8.5–10.1)
CHLORIDE SERPL-SCNC: 102 MMOL/L — SIGNIFICANT CHANGE UP (ref 96–108)
CO2 SERPL-SCNC: 24 MMOL/L — SIGNIFICANT CHANGE UP (ref 22–31)
CREAT SERPL-MCNC: 1.6 MG/DL — HIGH (ref 0.5–1.3)
FERRITIN SERPL-MCNC: 91 NG/ML — SIGNIFICANT CHANGE UP (ref 15–150)
GLUCOSE SERPL-MCNC: 93 MG/DL — SIGNIFICANT CHANGE UP (ref 70–99)
HCT VFR BLD CALC: 23.1 % — LOW (ref 34.5–45)
HGB BLD-MCNC: 7.8 G/DL — LOW (ref 11.5–15.5)
IRON SATN MFR SERPL: 17 % — SIGNIFICANT CHANGE UP (ref 14–50)
IRON SATN MFR SERPL: 41 UG/DL — SIGNIFICANT CHANGE UP (ref 30–160)
MCHC RBC-ENTMCNC: 33.4 PG — SIGNIFICANT CHANGE UP (ref 27–34)
MCHC RBC-ENTMCNC: 33.6 GM/DL — SIGNIFICANT CHANGE UP (ref 32–36)
MCV RBC AUTO: 99.3 FL — SIGNIFICANT CHANGE UP (ref 80–100)
PLATELET # BLD AUTO: 239 K/UL — SIGNIFICANT CHANGE UP (ref 150–400)
POTASSIUM SERPL-MCNC: 5 MMOL/L — SIGNIFICANT CHANGE UP (ref 3.5–5.3)
POTASSIUM SERPL-SCNC: 5 MMOL/L — SIGNIFICANT CHANGE UP (ref 3.5–5.3)
RBC # BLD: 2.33 M/UL — LOW (ref 3.8–5.2)
RBC # FLD: 14.2 % — SIGNIFICANT CHANGE UP (ref 10.3–14.5)
SODIUM SERPL-SCNC: 135 MMOL/L — SIGNIFICANT CHANGE UP (ref 135–145)
TIBC SERPL-MCNC: 238 UG/DL — SIGNIFICANT CHANGE UP (ref 220–430)
UIBC SERPL-MCNC: 197 UG/DL — SIGNIFICANT CHANGE UP (ref 110–370)
WBC # BLD: 6.2 K/UL — SIGNIFICANT CHANGE UP (ref 3.8–10.5)
WBC # FLD AUTO: 6.2 K/UL — SIGNIFICANT CHANGE UP (ref 3.8–10.5)

## 2018-03-01 PROCEDURE — 99233 SBSQ HOSP IP/OBS HIGH 50: CPT

## 2018-03-01 RX ADMIN — PANTOPRAZOLE SODIUM 40 MILLIGRAM(S): 20 TABLET, DELAYED RELEASE ORAL at 06:02

## 2018-03-01 RX ADMIN — OXYCODONE AND ACETAMINOPHEN 1 TABLET(S): 5; 325 TABLET ORAL at 22:10

## 2018-03-01 RX ADMIN — TRAMADOL HYDROCHLORIDE 50 MILLIGRAM(S): 50 TABLET ORAL at 18:41

## 2018-03-01 RX ADMIN — TRAMADOL HYDROCHLORIDE 50 MILLIGRAM(S): 50 TABLET ORAL at 18:40

## 2018-03-01 RX ADMIN — Medication 100 MILLIGRAM(S): at 06:02

## 2018-03-01 RX ADMIN — HEPARIN SODIUM 5000 UNIT(S): 5000 INJECTION INTRAVENOUS; SUBCUTANEOUS at 18:00

## 2018-03-01 RX ADMIN — TRANYLCYPROMINE SULFATE 10 MILLIGRAM(S): 10 TABLET, FILM COATED ORAL at 14:06

## 2018-03-01 RX ADMIN — Medication 25 MILLIGRAM(S): at 18:41

## 2018-03-01 RX ADMIN — OXYCODONE AND ACETAMINOPHEN 1 TABLET(S): 5; 325 TABLET ORAL at 14:05

## 2018-03-01 RX ADMIN — OXYCODONE AND ACETAMINOPHEN 1 TABLET(S): 5; 325 TABLET ORAL at 14:30

## 2018-03-01 RX ADMIN — TRANYLCYPROMINE SULFATE 10 MILLIGRAM(S): 10 TABLET, FILM COATED ORAL at 06:02

## 2018-03-01 RX ADMIN — Medication 10 MILLIGRAM(S): at 18:40

## 2018-03-01 RX ADMIN — OXYCODONE AND ACETAMINOPHEN 1 TABLET(S): 5; 325 TABLET ORAL at 21:39

## 2018-03-01 RX ADMIN — HEPARIN SODIUM 5000 UNIT(S): 5000 INJECTION INTRAVENOUS; SUBCUTANEOUS at 06:02

## 2018-03-01 RX ADMIN — Medication 3 MILLIGRAM(S): at 21:40

## 2018-03-01 RX ADMIN — Medication 100 MILLIGRAM(S): at 14:06

## 2018-03-01 RX ADMIN — TRANYLCYPROMINE SULFATE 10 MILLIGRAM(S): 10 TABLET, FILM COATED ORAL at 21:40

## 2018-03-01 RX ADMIN — Medication 100 MILLIGRAM(S): at 21:40

## 2018-03-01 NOTE — PROGRESS NOTE ADULT - PROBLEM SELECTOR PROBLEM 3
REGINALDO (acute kidney injury)

## 2018-03-01 NOTE — PROGRESS NOTE ADULT - NSHPATTENDINGPLANDISCUSS_GEN_ALL_CORE
patient, son, SW, consults
patient, son, , , consults
patient, son, , , consults
patient, son, SW, consults

## 2018-03-01 NOTE — PROGRESS NOTE ADULT - PROBLEM SELECTOR PLAN 5
HTN, chronic, stable.   - Continue Metoprolol tartrate 25mg BID with hold parameters.   - Hold Cozaar 100mg PO daily in setting of REGINALDO. Resume when REGINALDO resolves.
HTN, chronic, stable.   - Continue Metoprolol tartrate and Nifedipine with hold parameters.   - Hold Cozaar 100mg PO daily in setting of REGINALDO on CKD
HTN, chronic, stable.   - Continue Metoprolol tartrate and Nifedipine with hold parameters.   - Hold Cozaar 100mg PO daily in setting of REGINALDO.
HTN, chronic, stable.   - Continue Metoprolol tartrate and Nifedipine with hold parameters.   - Hold Cozaar 100mg PO daily in setting of REGINALDO.

## 2018-03-01 NOTE — PROGRESS NOTE ADULT - SUBJECTIVE AND OBJECTIVE BOX
INTERVAL HPI/OVERNIGHT EVENTS: Patient seen and examined at bedside. No acute events overnight. Patient with baseline dementia but not in distress.  Spoke to cheng Astudillo at bedside aware of plan. Awaiting PT eval for [facility] versus home as per sons wishes.     MEDICATIONS  (STANDING):  docusate sodium 100 milliGRAM(s) Oral three times a day  heparin  Injectable 5000 Unit(s) SubCutaneous every 12 hours  metoprolol     tartrate 25 milliGRAM(s) Oral two times a day  NIFEdipine IR 10 milliGRAM(s) Oral every 12 hours  pantoprazole    Tablet 40 milliGRAM(s) Oral before breakfast  tranylcypromine 10 milliGRAM(s) Oral three times a day    MEDICATIONS  (PRN):  acetaminophen   Tablet. 650 milliGRAM(s) Oral every 6 hours PRN Mild Pain (1 - 3)  ALPRAZolam 0.5 milliGRAM(s) Oral four times a day PRN anxiety  oxyCODONE    5 mG/acetaminophen 325 mG 1 Tablet(s) Oral every 6 hours PRN Moderate Pain (4 - 6)  senna 2 Tablet(s) Oral at bedtime PRN Constipation  traMADol 50 milliGRAM(s) Oral every 6 hours PRN Severe Pain (7 - 10)      Allergies    No Known Allergies    Intolerances        ROS: Unable to obtain, patient with dementia     Vital Signs Last 24 Hrs  T(C): 36.3 (2018 11:57), Max: 36.7 (2018 23:56)  T(F): 97.4 (2018 11:57), Max: 98.1 (2018 23:56)  HR: 63 (2018 11:57) (58 - 86)  BP: 107/58 (2018 11:57) (99/64 - 124/64)  BP(mean): --  RR: 16 (2018 11:57) (16 - 16)  SpO2: 97% (2018 11:57) (96% - 99%)    General: NAD  Neurology: A&Ox1 , nonfocal  Respiratory: CTA B/L  CV: RRR, S1S2  Abdominal: Soft, NT, ND +BS  Extremities: + LLE edema, + peripheral pulses, brace on Left leg      LABS:                        8.2    6.4   )-----------( 186      ( 2018 07:09 )             25.0         136  |  103  |  41<H>  ----------------------------<  89  4.5   |  25  |  1.50<H>    Ca    8.4<L>      2018 07:09               Urinalysis Basic - ( 2018 07:01 )    Color: Yellow / Appearance: Clear / S.015 / pH: x  Gluc: x / Ketone: Negative  / Bili: Negative / Urobili: Negative   Blood: x / Protein: Negative / Nitrite: Negative   Leuk Esterase: Negative / RBC: 0-2 /HPF / WBC 3-5   Sq Epi: x / Non Sq Epi: Occasional / Bacteria: Moderate    Culture - Urine (18 @ 09:47)    -  Amikacin: S <=8    -  Aztreonam: S <=4    -  Cefepime: S <=2    -  Ceftazidime: S <=1    -  Ciprofloxacin: S <=0.5    -  Gentamicin: S <=1    -  Imipenem: S <=1    -  Levofloxacin: S <=1    -  Meropenem: S <=1    -  Piperacillin/Tazobactam: S <=8    -  Tobramycin: S <=2    Specimen Source: .Urine Catheterized    Culture Results:   >100,000 CFU/ml Pseudomonas aeruginosa  Multiple Morphological Strains    Organism Identification: Pseudomonas aeruginosa    Organism: Pseudomonas aeruginosa    Method Type: RUPERTO        RADIOLOGY & ADDITIONAL TESTS:
Pt S/E at bedside, no acute events overnight, pain controlled    Vital Signs Last 24 Hrs  T(C): 36.3 (25 Feb 2018 23:53), Max: 36.4 (25 Feb 2018 16:36)  T(F): 97.4 (25 Feb 2018 23:53), Max: 97.6 (25 Feb 2018 16:36)  HR: 73 (26 Feb 2018 06:07) (60 - 99)  BP: 104/59 (26 Feb 2018 06:07) (104/59 - 156/71)  BP(mean): --  RR: 16 (25 Feb 2018 23:53) (16 - 17)  SpO2: 97% (25 Feb 2018 23:53) (94% - 98%)    Gen: NAD,     Left Lower Extremity:  Skin intact  In Bulky Platt knee immobilizer  +EHL/FHL/TA/GS  Sensation exam limited secondary to patient mental status  +DP Pulse  Brisk CR  Compartments soft  No calf TTP B/L
INTERVAL HPI/OVERNIGHT EVENTS: Patient seen and examined at bedside. No acute events overnight. Patient with baseline dementia but not in distress.  Spoke to cheng Astudillo at bedside aware of plan.     MEDICATIONS  (STANDING):  docusate sodium 100 milliGRAM(s) Oral three times a day  heparin  Injectable 5000 Unit(s) SubCutaneous every 12 hours  metoprolol     tartrate 25 milliGRAM(s) Oral two times a day  NIFEdipine IR 10 milliGRAM(s) Oral every 12 hours  pantoprazole    Tablet 40 milliGRAM(s) Oral before breakfast  tranylcypromine 10 milliGRAM(s) Oral three times a day    MEDICATIONS  (PRN):  acetaminophen   Tablet. 650 milliGRAM(s) Oral every 6 hours PRN Mild Pain (1 - 3)  ALPRAZolam 0.5 milliGRAM(s) Oral four times a day PRN anxiety  oxyCODONE    5 mG/acetaminophen 325 mG 1 Tablet(s) Oral every 6 hours PRN Moderate Pain (4 - 6)  senna 2 Tablet(s) Oral at bedtime PRN Constipation  traMADol 50 milliGRAM(s) Oral every 6 hours PRN Severe Pain (7 - 10)      Allergies    No Known Allergies    Intolerances        ROS: Unable to obtain, patient with dementia     Vital Signs Last 24 Hrs  T(C): 36.4 (2018 09:45), Max: 36.9 (2018 07:07)  T(F): 97.5 (2018 09:45), Max: 98.4 (2018 07:07)  HR: 67 (2018 09:45) (60 - 99)  BP: 115/63 (2018 09:45) (104/59 - 156/71)  BP(mean): --  RR: 15 (2018 09:45) (15 - 17)  SpO2: 97% (2018 09:45) (96% - 97%)    General: NAD  Neurology: A&Ox1 , nonfocal  Respiratory: CTA B/L  CV: RRR, S1S2  Abdominal: Soft, NT, ND +BS  Extremities: + LLE edema, + peripheral pulses, brace on Left leg      LABS:                        10.8   9.1   )-----------( 199      ( 2018 06:30 )             33.1     02-    137  |  103  |  43<H>  ----------------------------<  117<H>  4.6   |  24  |  1.60<H>    Ca    8.4<L>      2018 06:57    TPro  6.9  /  Alb  3.5  /  TBili  0.5  /  DBili  x   /  AST  18  /  ALT  13  /  AlkPhos  193<H>  02-25    PT/INR - ( 2018 06:30 )   PT: 11.9 sec;   INR: 1.09 ratio           Urinalysis Basic - ( 2018 07:01 )    Color: Yellow / Appearance: Clear / S.015 / pH: x  Gluc: x / Ketone: Negative  / Bili: Negative / Urobili: Negative   Blood: x / Protein: Negative / Nitrite: Negative   Leuk Esterase: Negative / RBC: 0-2 /HPF / WBC 3-5   Sq Epi: x / Non Sq Epi: Occasional / Bacteria: Moderate        RADIOLOGY & ADDITIONAL TESTS:
INTERVAL HPI/OVERNIGHT EVENTS: Patient seen and examined at bedside. No acute events overnight. Patient with baseline dementia but not in distress.  Son wishes to take patient home not rehab.  and SW aware. Awaiting discharge tomorrow AM.    MEDICATIONS  (STANDING):  docusate sodium 100 milliGRAM(s) Oral three times a day  heparin  Injectable 5000 Unit(s) SubCutaneous every 12 hours  melatonin 3 milliGRAM(s) Oral at bedtime  metoprolol     tartrate 25 milliGRAM(s) Oral two times a day  NIFEdipine IR 10 milliGRAM(s) Oral every 12 hours  pantoprazole    Tablet 40 milliGRAM(s) Oral before breakfast  sodium chloride 0.9%. 1000 milliLiter(s) (50 mL/Hr) IV Continuous <Continuous>  tranylcypromine 10 milliGRAM(s) Oral three times a day    MEDICATIONS  (PRN):  acetaminophen   Tablet. 650 milliGRAM(s) Oral every 6 hours PRN Mild Pain (1 - 3)  ALPRAZolam 0.5 milliGRAM(s) Oral four times a day PRN anxiety  oxyCODONE    5 mG/acetaminophen 325 mG 1 Tablet(s) Oral every 6 hours PRN Moderate Pain (4 - 6)  senna 2 Tablet(s) Oral at bedtime PRN Constipation  traMADol 50 milliGRAM(s) Oral every 6 hours PRN Severe Pain (7 - 10)          Allergies    No Known Allergies    Intolerances        ROS: Unable to obtain, patient with dementia     Vital Signs Last 24 Hrs  T(C): 36.3 (01 Mar 2018 13:15), Max: 36.8 (2018 20:30)  T(F): 97.3 (01 Mar 2018 13:15), Max: 98.2 (2018 20:30)  HR: 83 (01 Mar 2018 13:15) (60 - 83)  BP: 108/67 (01 Mar 2018 13:15) (104/61 - 122/57)  BP(mean): --  RR: 19 (01 Mar 2018 13:15) (17 - 19)  SpO2: 100% (01 Mar 2018 13:15) (93% - 100%)    General: NAD  Neurology: A&Ox1 , nonfocal  Respiratory: CTA B/L  CV: RRR, S1S2  Abdominal: Soft, NT, ND +BS  Extremities: + LLE edema, + peripheral pulses, brace on Left leg      LABS:                        7.8    6.2   )-----------( 239      ( 01 Mar 2018 06:48 )             23.1     03-    135  |  102  |  43<H>  ----------------------------<  93  5.0   |  24  |  1.60<H>    Ca    8.1<L>      01 Mar 2018 06:48         Urinalysis Basic - ( 2018 07:01 )    Color: Yellow / Appearance: Clear / S.015 / pH: x  Gluc: x / Ketone: Negative  / Bili: Negative / Urobili: Negative   Blood: x / Protein: Negative / Nitrite: Negative   Leuk Esterase: Negative / RBC: 0-2 /HPF / WBC 3-5   Sq Epi: x / Non Sq Epi: Occasional / Bacteria: Moderate    Culture - Urine (18 @ 09:47)    -  Amikacin: S <=8    -  Aztreonam: S <=4    -  Cefepime: S <=2    -  Ceftazidime: S <=1    -  Ciprofloxacin: S <=0.5    -  Gentamicin: S <=1    -  Imipenem: S <=1    -  Levofloxacin: S <=1    -  Meropenem: S <=1    -  Piperacillin/Tazobactam: S <=8    -  Tobramycin: S <=2    Specimen Source: .Urine Catheterized    Culture Results:   >100,000 CFU/ml Pseudomonas aeruginosa  Multiple Morphological Strains    Organism Identification: Pseudomonas aeruginosa    Organism: Pseudomonas aeruginosa    Method Type: RUPERTO        RADIOLOGY & ADDITIONAL TESTS:
INTERVAL HPI/OVERNIGHT EVENTS: Patient seen and examined at bedside. No acute events overnight. Patient with baseline dementia but not in distress.  Son wishes to take patient home not rehab.  and SW aware.    MEDICATIONS  (STANDING):  docusate sodium 100 milliGRAM(s) Oral three times a day  heparin  Injectable 5000 Unit(s) SubCutaneous every 12 hours  metoprolol     tartrate 25 milliGRAM(s) Oral two times a day  NIFEdipine IR 10 milliGRAM(s) Oral every 12 hours  pantoprazole    Tablet 40 milliGRAM(s) Oral before breakfast  tranylcypromine 10 milliGRAM(s) Oral three times a day    MEDICATIONS  (PRN):  acetaminophen   Tablet. 650 milliGRAM(s) Oral every 6 hours PRN Mild Pain (1 - 3)  ALPRAZolam 0.5 milliGRAM(s) Oral four times a day PRN anxiety  oxyCODONE    5 mG/acetaminophen 325 mG 1 Tablet(s) Oral every 6 hours PRN Moderate Pain (4 - 6)  senna 2 Tablet(s) Oral at bedtime PRN Constipation  traMADol 50 milliGRAM(s) Oral every 6 hours PRN Severe Pain (7 - 10)        Allergies    No Known Allergies    Intolerances        ROS: Unable to obtain, patient with dementia     Vital Signs Last 24 Hrs  T(C): 36.6 (2018 13:01), Max: 36.8 (2018 20:41)  T(F): 97.8 (2018 13:01), Max: 98.2 (2018 20:41)  HR: 68 (2018 13:01) (68 - 94)  BP: 118/63 (2018 13:01) (96/54 - 118/63)  BP(mean): --  RR: 17 (2018 13:01) (16 - 17)  SpO2: 96% (2018 13:01) (95% - 98%)    General: NAD  Neurology: A&Ox1 , nonfocal  Respiratory: CTA B/L  CV: RRR, S1S2  Abdominal: Soft, NT, ND +BS  Extremities: + LLE edema, + peripheral pulses, brace on Left leg      LABS:                        8.4    6.6   )-----------( 231      ( 2018 07:02 )             26.3     02-28    133<L>  |  101  |  40<H>  ----------------------------<  100<H>  4.9   |  24  |  1.60<H>    Ca    8.5      2018 07:02           Urinalysis Basic - ( 2018 07:01 )    Color: Yellow / Appearance: Clear / S.015 / pH: x  Gluc: x / Ketone: Negative  / Bili: Negative / Urobili: Negative   Blood: x / Protein: Negative / Nitrite: Negative   Leuk Esterase: Negative / RBC: 0-2 /HPF / WBC 3-5   Sq Epi: x / Non Sq Epi: Occasional / Bacteria: Moderate    Culture - Urine (18 @ 09:47)    -  Amikacin: S <=8    -  Aztreonam: S <=4    -  Cefepime: S <=2    -  Ceftazidime: S <=1    -  Ciprofloxacin: S <=0.5    -  Gentamicin: S <=1    -  Imipenem: S <=1    -  Levofloxacin: S <=1    -  Meropenem: S <=1    -  Piperacillin/Tazobactam: S <=8    -  Tobramycin: S <=2    Specimen Source: .Urine Catheterized    Culture Results:   >100,000 CFU/ml Pseudomonas aeruginosa  Multiple Morphological Strains    Organism Identification: Pseudomonas aeruginosa    Organism: Pseudomonas aeruginosa    Method Type: RUPERTO        RADIOLOGY & ADDITIONAL TESTS:

## 2018-03-01 NOTE — PROGRESS NOTE ADULT - PROBLEM SELECTOR PLAN 4
Hx of dementia  -fall precautions  -continue Parnate 10mg
Hx of dementia  -fall precautions  -continue Parnate 10mg
Hx of dementia  -fall precautions  -continue Parnate 10mg   -SW consult for [facility]
Hx of dementia  -fall precautions  -continue Parnate 10mg

## 2018-03-01 NOTE — PROGRESS NOTE ADULT - PROBLEM SELECTOR PLAN 10
DNR/DNI. Patient's family does not want artificial feeds.   PT consult needs [facility]

## 2018-03-01 NOTE — PROGRESS NOTE ADULT - PROBLEM SELECTOR PLAN 9
IMPROVE VTE Individual Risk Assessment          RISK                                                          Points  [  ] Previous VTE                                                3  [  ] Thrombophilia                                             2  [  ] Lower limb paralysis                                   2        (unable to hold up >15 seconds)    [  ] Current Cancer                                             2         (within 6 months)  [ x ] Immobilization > 24 hrs                              1  [  ] ICU/CCU stay > 24 hours                             1  [  x] Age > 60                                                         1    IMPROVE VTE Score: 2    Heparin 5000 units SQ BID for dvt ppx

## 2018-03-01 NOTE — PROGRESS NOTE ADULT - PROBLEM SELECTOR PROBLEM 1
Fracture of tibia and fibula, left, closed, initial encounter

## 2018-03-01 NOTE — PROGRESS NOTE ADULT - ASSESSMENT
89yo F with PMHx of advanced dementia, HTN, GERD, chronic pain, anxiety w/ panic attacks presents s/p mechanical fall after "falling out of bed" at home, admitted with tibia, fibula fracture s/p mechanical fall and likely [facility].
A/P: 89y Female with L tibial plateau and proximal fibula fracture  Pain control  NWB LLE, maintain in bulky harp knee immobilizer  DVT PPX  Admit to medical team  No acute orthopedic surgical intervention at this time  Ortho Stable  Discussed with Dr. De León who agrees with above
89yo F with PMHx of advanced dementia, HTN, GERD, chronic pain, anxiety w/ panic attacks presents s/p mechanical fall after "falling out of bed" at home, admitted with tibia, fibula fracture s/p mechanical fall and likely [facility]IAS.

## 2018-03-01 NOTE — PROGRESS NOTE ADULT - PROBLEM SELECTOR PLAN 7
Will continue Xanax 0.5mg [id number] prn as patient has been taking it for years.   Please hold for lethargy or sedation.
Will continue Xanax 0.5mg [id number] prn as patient has been taking it for years.   Please hold for lethargy or sedation.
Will continue Xanax 0.5mg [id number] prn as patient has been taking it for years.   Please hold for lethargy or sedation.
Will continue Xanax 0.5mg [id number] prn as patient has been taking it for years.   Please hold for lethargy or sedation.

## 2018-03-01 NOTE — PROGRESS NOTE ADULT - PROBLEM SELECTOR PLAN 1
Fracture of tibia and fibula s/p mechanical fall  -Tylenol for mild pain, percocet for moderate pain, tramadol for severe pain.   -Will hold home vicodin for now.   - Ortho consulted. No surgical intervention required at this time.   -  aware son does not want rehab, plan for dc home once HHA in place  - PT eval  - fall precautions  -safety precautions   - pain management
Fracture of tibia and fibula s/p mechanical fall  -Tylenol for mild pain, percocet for moderate pain, tramadol for severe pain.   -Will hold home vicodin for now.   - Ortho consulted. No surgical intervention required at this time.   - SW consult for possible ELIAS  - PT eval  - fall precautions  -safety precautions   - pain management
Fracture of tibia and fibula s/p mechanical fall  - Tylenol for mild pain, percocet for moderate pain, tramadol for severe pain.   -Will hold home vicodin for now.   - Ortho consulted. No surgical intervention required at this time.   - SW consult for possible ELIAS  - PT eval  - fall precautions  -safety precautions   - pain management
Fracture of tibia and fibula s/p mechanical fall  -Tylenol for mild pain, percocet for moderate pain, tramadol for severe pain.   -Will hold home vicodin for now.   - Ortho consulted. No surgical intervention required at this time.   -  aware son does not want rehab, plan for dc home once HHA in place  - PT eval  - fall precautions  -safety precautions   - pain management

## 2018-03-01 NOTE — PROGRESS NOTE ADULT - PROBLEM SELECTOR PLAN 2
-urine culture with pseudomonas, awaiting sensitivities, no active complaints, wbc count normal  -ID consulted no need for treatment with abx  -Blood cx NGTD
-urine culture with pseudomonas, awaiting sensitivities, no active complaints, wbc count normal, ID consulted if warrants medical treatment  -Blood cx NGTD
improved  -f/u blood cx NGTD  -urine culture with pseudomonas, awaiting sensitivities, no active complaints, wbc count normal
-urine culture with pseudomonas, awaiting sensitivities, no active complaints, wbc count normal  -ID consulted no need for treatment with abx  -Blood cx NGTD

## 2018-03-01 NOTE — PROGRESS NOTE ADULT - ATTENDING COMMENTS
Anemia- iron panel reviewed, suspect multifactioral, ACD and dilutional would hold off further work up at this time    REGINALDO on CKD- hold nephrotoxic agents, IVF for now. Monitor BMP as outpatient.     Advance Directives: Spoke to cheng Wilda at bedside and palliative care nurse. Patient DNR/DNI. No feeding tubes. Molts in chart (16 min)
Advance Directives: Spoke to son Wilda at bedside and palliative care nurse. Patient DNR/DNI. No feeding tubes. Molts in chart (16 min)

## 2018-03-01 NOTE — PROGRESS NOTE ADULT - PROBLEM SELECTOR PROBLEM 2
UTI (urinary tract infection)
UTI (urinary tract infection)
Hypothermia, initial encounter
UTI (urinary tract infection)

## 2018-03-01 NOTE — PROGRESS NOTE ADULT - PROBLEM SELECTOR PLAN 3
REGINALDO 2/2 likely dehydration  monitor cr  gentle hydration with IVF
REGINALDO 2/2 likely dehydration  monitor cr  gentle hydration with IVF
REGINALDO 2/2 likely dehydration  monitor cr  gentle hydration oral for now, if not improved resume IV
REGINALDO 2/2 likely dehydration  monitor cr  gentle hydration with IVF

## 2018-03-02 VITALS
HEART RATE: 81 BPM | RESPIRATION RATE: 17 BRPM | DIASTOLIC BLOOD PRESSURE: 66 MMHG | OXYGEN SATURATION: 94 % | TEMPERATURE: 97 F | SYSTOLIC BLOOD PRESSURE: 114 MMHG

## 2018-03-02 LAB
CULTURE RESULTS: SIGNIFICANT CHANGE UP
CULTURE RESULTS: SIGNIFICANT CHANGE UP
SPECIMEN SOURCE: SIGNIFICANT CHANGE UP
SPECIMEN SOURCE: SIGNIFICANT CHANGE UP

## 2018-03-02 PROCEDURE — 82550 ASSAY OF CK (CPK): CPT

## 2018-03-02 PROCEDURE — 97530 THERAPEUTIC ACTIVITIES: CPT

## 2018-03-02 PROCEDURE — 83605 ASSAY OF LACTIC ACID: CPT

## 2018-03-02 PROCEDURE — 80048 BASIC METABOLIC PNL TOTAL CA: CPT

## 2018-03-02 PROCEDURE — 73521 X-RAY EXAM HIPS BI 2 VIEWS: CPT

## 2018-03-02 PROCEDURE — 97162 PT EVAL MOD COMPLEX 30 MIN: CPT

## 2018-03-02 PROCEDURE — 85027 COMPLETE CBC AUTOMATED: CPT

## 2018-03-02 PROCEDURE — 96372 THER/PROPH/DIAG INJ SC/IM: CPT | Mod: 59

## 2018-03-02 PROCEDURE — 97110 THERAPEUTIC EXERCISES: CPT

## 2018-03-02 PROCEDURE — 99239 HOSP IP/OBS DSCHRG MGMT >30: CPT

## 2018-03-02 PROCEDURE — 73562 X-RAY EXAM OF KNEE 3: CPT

## 2018-03-02 PROCEDURE — 99285 EMERGENCY DEPT VISIT HI MDM: CPT | Mod: 25

## 2018-03-02 PROCEDURE — 96375 TX/PRO/DX INJ NEW DRUG ADDON: CPT

## 2018-03-02 PROCEDURE — 84484 ASSAY OF TROPONIN QUANT: CPT

## 2018-03-02 PROCEDURE — 87186 SC STD MICRODIL/AGAR DIL: CPT

## 2018-03-02 PROCEDURE — 81001 URINALYSIS AUTO W/SCOPE: CPT

## 2018-03-02 PROCEDURE — 70450 CT HEAD/BRAIN W/O DYE: CPT

## 2018-03-02 PROCEDURE — 96374 THER/PROPH/DIAG INJ IV PUSH: CPT

## 2018-03-02 PROCEDURE — 73700 CT LOWER EXTREMITY W/O DYE: CPT

## 2018-03-02 PROCEDURE — 87040 BLOOD CULTURE FOR BACTERIA: CPT

## 2018-03-02 PROCEDURE — 71045 X-RAY EXAM CHEST 1 VIEW: CPT

## 2018-03-02 PROCEDURE — 83550 IRON BINDING TEST: CPT

## 2018-03-02 PROCEDURE — 93005 ELECTROCARDIOGRAM TRACING: CPT

## 2018-03-02 PROCEDURE — 80053 COMPREHEN METABOLIC PANEL: CPT

## 2018-03-02 PROCEDURE — 72192 CT PELVIS W/O DYE: CPT

## 2018-03-02 PROCEDURE — 73590 X-RAY EXAM OF LOWER LEG: CPT

## 2018-03-02 PROCEDURE — 73610 X-RAY EXAM OF ANKLE: CPT

## 2018-03-02 PROCEDURE — 73552 X-RAY EXAM OF FEMUR 2/>: CPT

## 2018-03-02 PROCEDURE — 85610 PROTHROMBIN TIME: CPT

## 2018-03-02 PROCEDURE — 87086 URINE CULTURE/COLONY COUNT: CPT

## 2018-03-02 PROCEDURE — 82728 ASSAY OF FERRITIN: CPT

## 2018-03-02 PROCEDURE — 72125 CT NECK SPINE W/O DYE: CPT

## 2018-03-02 RX ORDER — TRAMADOL HYDROCHLORIDE 50 MG/1
1 TABLET ORAL
Qty: 20 | Refills: 0 | OUTPATIENT
Start: 2018-03-02 | End: 2018-03-06

## 2018-03-02 RX ADMIN — TRAMADOL HYDROCHLORIDE 50 MILLIGRAM(S): 50 TABLET ORAL at 13:01

## 2018-03-02 RX ADMIN — PANTOPRAZOLE SODIUM 40 MILLIGRAM(S): 20 TABLET, DELAYED RELEASE ORAL at 05:29

## 2018-03-02 RX ADMIN — OXYCODONE AND ACETAMINOPHEN 1 TABLET(S): 5; 325 TABLET ORAL at 05:29

## 2018-03-02 RX ADMIN — HEPARIN SODIUM 5000 UNIT(S): 5000 INJECTION INTRAVENOUS; SUBCUTANEOUS at 05:28

## 2018-03-02 RX ADMIN — Medication 10 MILLIGRAM(S): at 05:29

## 2018-03-02 RX ADMIN — Medication 100 MILLIGRAM(S): at 05:29

## 2018-03-02 RX ADMIN — SENNA PLUS 2 TABLET(S): 8.6 TABLET ORAL at 11:59

## 2018-03-02 RX ADMIN — TRAMADOL HYDROCHLORIDE 50 MILLIGRAM(S): 50 TABLET ORAL at 12:01

## 2018-03-02 RX ADMIN — TRANYLCYPROMINE SULFATE 10 MILLIGRAM(S): 10 TABLET, FILM COATED ORAL at 05:29

## 2018-03-02 RX ADMIN — OXYCODONE AND ACETAMINOPHEN 1 TABLET(S): 5; 325 TABLET ORAL at 06:10

## 2018-04-16 NOTE — PATIENT PROFILE ADULT. - URINARY CATHETER
This is a recent snapshot of the patient's [facility] medical record.  For current drug dose and complete information and questions, call [phone] or In Basket pool, [facility] ([id number])  CSN Number:  [id number]      
no

## 2018-07-14 ENCOUNTER — INPATIENT (INPATIENT)
Facility: HOSPITAL | Age: 83
LOS: 1 days | DRG: 871 | End: 2018-07-16
Attending: HOSPITALIST | Admitting: INTERNAL MEDICINE
Payer: MEDICARE

## 2018-07-14 VITALS — HEIGHT: 63 IN | WEIGHT: 139.99 LBS

## 2018-07-14 DIAGNOSIS — Z29.9 ENCOUNTER FOR PROPHYLACTIC MEASURES, UNSPECIFIED: ICD-10-CM

## 2018-07-14 DIAGNOSIS — N17.9 ACUTE KIDNEY FAILURE, UNSPECIFIED: ICD-10-CM

## 2018-07-14 DIAGNOSIS — J18.1 LOBAR PNEUMONIA, UNSPECIFIED ORGANISM: ICD-10-CM

## 2018-07-14 DIAGNOSIS — I10 ESSENTIAL (PRIMARY) HYPERTENSION: ICD-10-CM

## 2018-07-14 DIAGNOSIS — Z90.12 ACQUIRED ABSENCE OF LEFT BREAST AND NIPPLE: Chronic | ICD-10-CM

## 2018-07-14 DIAGNOSIS — F03.90 UNSPECIFIED DEMENTIA WITHOUT BEHAVIORAL DISTURBANCE: ICD-10-CM

## 2018-07-14 DIAGNOSIS — R09.89 OTHER SPECIFIED SYMPTOMS AND SIGNS INVOLVING THE CIRCULATORY AND RESPIRATORY SYSTEMS: ICD-10-CM

## 2018-07-14 DIAGNOSIS — E87.1 HYPO-OSMOLALITY AND HYPONATREMIA: ICD-10-CM

## 2018-07-14 DIAGNOSIS — I48.91 UNSPECIFIED ATRIAL FIBRILLATION: ICD-10-CM

## 2018-07-14 DIAGNOSIS — R79.89 OTHER SPECIFIED ABNORMAL FINDINGS OF BLOOD CHEMISTRY: ICD-10-CM

## 2018-07-14 DIAGNOSIS — F41.9 ANXIETY DISORDER, UNSPECIFIED: ICD-10-CM

## 2018-07-14 DIAGNOSIS — M48.00 SPINAL STENOSIS, SITE UNSPECIFIED: ICD-10-CM

## 2018-07-14 LAB
ALBUMIN SERPL ELPH-MCNC: 2.8 G/DL — LOW (ref 3.3–5)
ALP SERPL-CCNC: 84 U/L — SIGNIFICANT CHANGE UP (ref 40–120)
ALT FLD-CCNC: 16 U/L — SIGNIFICANT CHANGE UP (ref 12–78)
ANION GAP SERPL CALC-SCNC: 14 MMOL/L — SIGNIFICANT CHANGE UP (ref 5–17)
APPEARANCE UR: CLEAR — SIGNIFICANT CHANGE UP
APTT BLD: 42.3 SEC — HIGH (ref 27.5–37.4)
AST SERPL-CCNC: 22 U/L — SIGNIFICANT CHANGE UP (ref 15–37)
BASOPHILS # BLD AUTO: 0.01 K/UL — SIGNIFICANT CHANGE UP (ref 0–0.2)
BASOPHILS NFR BLD AUTO: 0.1 % — SIGNIFICANT CHANGE UP (ref 0–2)
BILIRUB SERPL-MCNC: 0.6 MG/DL — SIGNIFICANT CHANGE UP (ref 0.2–1.2)
BILIRUB UR-MCNC: NEGATIVE — SIGNIFICANT CHANGE UP
BUN SERPL-MCNC: 50 MG/DL — HIGH (ref 7–23)
CALCIUM SERPL-MCNC: 8.4 MG/DL — LOW (ref 8.5–10.1)
CHLORIDE SERPL-SCNC: 100 MMOL/L — SIGNIFICANT CHANGE UP (ref 96–108)
CO2 SERPL-SCNC: 20 MMOL/L — LOW (ref 22–31)
COLOR SPEC: YELLOW — SIGNIFICANT CHANGE UP
CREAT SERPL-MCNC: 2.3 MG/DL — HIGH (ref 0.5–1.3)
DIFF PNL FLD: NEGATIVE — SIGNIFICANT CHANGE UP
EOSINOPHIL # BLD AUTO: 0.02 K/UL — SIGNIFICANT CHANGE UP (ref 0–0.5)
EOSINOPHIL NFR BLD AUTO: 0.1 % — SIGNIFICANT CHANGE UP (ref 0–6)
GLUCOSE SERPL-MCNC: 117 MG/DL — HIGH (ref 70–99)
GLUCOSE UR QL: NEGATIVE — SIGNIFICANT CHANGE UP
HCT VFR BLD CALC: 32.8 % — LOW (ref 34.5–45)
HGB BLD-MCNC: 11 G/DL — LOW (ref 11.5–15.5)
IMM GRANULOCYTES NFR BLD AUTO: 1.8 % — HIGH (ref 0–1.5)
INR BLD: 1.94 RATIO — HIGH (ref 0.88–1.16)
KETONES UR-MCNC: NEGATIVE — SIGNIFICANT CHANGE UP
LACTATE SERPL-SCNC: 2.3 MMOL/L — HIGH (ref 0.7–2)
LACTATE SERPL-SCNC: 3.9 MMOL/L — HIGH (ref 0.7–2)
LACTATE SERPL-SCNC: 4.7 MMOL/L — CRITICAL HIGH (ref 0.7–2)
LEUKOCYTE ESTERASE UR-ACNC: ABNORMAL
LYMPHOCYTES # BLD AUTO: 1.19 K/UL — SIGNIFICANT CHANGE UP (ref 1–3.3)
LYMPHOCYTES # BLD AUTO: 8 % — LOW (ref 13–44)
MCHC RBC-ENTMCNC: 31.1 PG — SIGNIFICANT CHANGE UP (ref 27–34)
MCHC RBC-ENTMCNC: 33.5 GM/DL — SIGNIFICANT CHANGE UP (ref 32–36)
MCV RBC AUTO: 92.7 FL — SIGNIFICANT CHANGE UP (ref 80–100)
MONOCYTES # BLD AUTO: 1.36 K/UL — HIGH (ref 0–0.9)
MONOCYTES NFR BLD AUTO: 9.2 % — SIGNIFICANT CHANGE UP (ref 2–14)
NEUTROPHILS # BLD AUTO: 12.01 K/UL — HIGH (ref 1.8–7.4)
NEUTROPHILS NFR BLD AUTO: 80.8 % — HIGH (ref 43–77)
NITRITE UR-MCNC: NEGATIVE — SIGNIFICANT CHANGE UP
NT-PROBNP SERPL-SCNC: HIGH PG/ML (ref 0–450)
PH UR: 5 — SIGNIFICANT CHANGE UP (ref 5–8)
PLATELET # BLD AUTO: 261 K/UL — SIGNIFICANT CHANGE UP (ref 150–400)
POTASSIUM SERPL-MCNC: 4.5 MMOL/L — SIGNIFICANT CHANGE UP (ref 3.5–5.3)
POTASSIUM SERPL-SCNC: 4.5 MMOL/L — SIGNIFICANT CHANGE UP (ref 3.5–5.3)
PROCALCITONIN SERPL-MCNC: 0.15 NG/ML — HIGH (ref 0–0.04)
PROT SERPL-MCNC: 5.7 G/DL — LOW (ref 6–8.3)
PROT UR-MCNC: NEGATIVE — SIGNIFICANT CHANGE UP
PROTHROM AB SERPL-ACNC: 21.4 SEC — HIGH (ref 9.8–12.7)
RBC # BLD: 3.54 M/UL — LOW (ref 3.8–5.2)
RBC # FLD: 15.2 % — HIGH (ref 10.3–14.5)
SODIUM SERPL-SCNC: 134 MMOL/L — LOW (ref 135–145)
SP GR SPEC: 1.01 — SIGNIFICANT CHANGE UP (ref 1.01–1.02)
UROBILINOGEN FLD QL: NEGATIVE — SIGNIFICANT CHANGE UP
WBC # BLD: 14.85 K/UL — HIGH (ref 3.8–10.5)
WBC # FLD AUTO: 14.85 K/UL — HIGH (ref 3.8–10.5)

## 2018-07-14 PROCEDURE — 99223 1ST HOSP IP/OBS HIGH 75: CPT | Mod: AI,GC

## 2018-07-14 PROCEDURE — 71250 CT THORAX DX C-: CPT | Mod: 26

## 2018-07-14 PROCEDURE — 71045 X-RAY EXAM CHEST 1 VIEW: CPT | Mod: 26

## 2018-07-14 PROCEDURE — 99222 1ST HOSP IP/OBS MODERATE 55: CPT

## 2018-07-14 PROCEDURE — 93010 ELECTROCARDIOGRAM REPORT: CPT | Mod: 76

## 2018-07-14 PROCEDURE — 99285 EMERGENCY DEPT VISIT HI MDM: CPT

## 2018-07-14 PROCEDURE — 99232 SBSQ HOSP IP/OBS MODERATE 35: CPT

## 2018-07-14 RX ORDER — PIPERACILLIN AND TAZOBACTAM 4; .5 G/20ML; G/20ML
3.38 INJECTION, POWDER, LYOPHILIZED, FOR SOLUTION INTRAVENOUS EVERY 12 HOURS
Qty: 0 | Refills: 0 | Status: DISCONTINUED | OUTPATIENT
Start: 2018-07-15 | End: 2018-07-16

## 2018-07-14 RX ORDER — PIPERACILLIN AND TAZOBACTAM 4; .5 G/20ML; G/20ML
3.38 INJECTION, POWDER, LYOPHILIZED, FOR SOLUTION INTRAVENOUS EVERY 12 HOURS
Qty: 0 | Refills: 0 | Status: DISCONTINUED | OUTPATIENT
Start: 2018-07-14 | End: 2018-07-14

## 2018-07-14 RX ORDER — AZITHROMYCIN 500 MG/1
500 TABLET, FILM COATED ORAL ONCE
Qty: 0 | Refills: 0 | Status: COMPLETED | OUTPATIENT
Start: 2018-07-14 | End: 2018-07-14

## 2018-07-14 RX ORDER — VANCOMYCIN HCL 1 G
1000 VIAL (EA) INTRAVENOUS ONCE
Qty: 0 | Refills: 0 | Status: COMPLETED | OUTPATIENT
Start: 2018-07-14 | End: 2018-07-15

## 2018-07-14 RX ORDER — HEPARIN SODIUM 5000 [USP'U]/ML
2500 INJECTION INTRAVENOUS; SUBCUTANEOUS EVERY 6 HOURS
Qty: 0 | Refills: 0 | Status: DISCONTINUED | OUTPATIENT
Start: 2018-07-14 | End: 2018-07-14

## 2018-07-14 RX ORDER — SODIUM CHLORIDE 9 MG/ML
250 INJECTION INTRAMUSCULAR; INTRAVENOUS; SUBCUTANEOUS ONCE
Qty: 0 | Refills: 0 | Status: COMPLETED | OUTPATIENT
Start: 2018-07-14 | End: 2018-07-14

## 2018-07-14 RX ORDER — CEFTRIAXONE 500 MG/1
1 INJECTION, POWDER, FOR SOLUTION INTRAMUSCULAR; INTRAVENOUS ONCE
Qty: 0 | Refills: 0 | Status: COMPLETED | OUTPATIENT
Start: 2018-07-14 | End: 2018-07-14

## 2018-07-14 RX ORDER — HEPARIN SODIUM 5000 [USP'U]/ML
INJECTION INTRAVENOUS; SUBCUTANEOUS
Qty: 25000 | Refills: 0 | Status: DISCONTINUED | OUTPATIENT
Start: 2018-07-14 | End: 2018-07-14

## 2018-07-14 RX ORDER — DOCUSATE SODIUM 100 MG
250 CAPSULE ORAL
Qty: 0 | Refills: 0 | COMMUNITY

## 2018-07-14 RX ORDER — METOPROLOL TARTRATE 50 MG
2.5 TABLET ORAL ONCE
Qty: 0 | Refills: 0 | Status: COMPLETED | OUTPATIENT
Start: 2018-07-14 | End: 2018-07-14

## 2018-07-14 RX ORDER — PIPERACILLIN AND TAZOBACTAM 4; .5 G/20ML; G/20ML
3.38 INJECTION, POWDER, LYOPHILIZED, FOR SOLUTION INTRAVENOUS ONCE
Qty: 0 | Refills: 0 | Status: COMPLETED | OUTPATIENT
Start: 2018-07-14 | End: 2018-07-14

## 2018-07-14 RX ORDER — ASPIRIN/CALCIUM CARB/MAGNESIUM 324 MG
81 TABLET ORAL DAILY
Qty: 0 | Refills: 0 | Status: DISCONTINUED | OUTPATIENT
Start: 2018-07-14 | End: 2018-07-16

## 2018-07-14 RX ORDER — NIFEDIPINE 30 MG
10 TABLET, EXTENDED RELEASE 24 HR ORAL
Qty: 0 | Refills: 0 | Status: DISCONTINUED | OUTPATIENT
Start: 2018-07-14 | End: 2018-07-15

## 2018-07-14 RX ORDER — PANTOPRAZOLE SODIUM 20 MG/1
40 TABLET, DELAYED RELEASE ORAL
Qty: 0 | Refills: 0 | Status: DISCONTINUED | OUTPATIENT
Start: 2018-07-14 | End: 2018-07-16

## 2018-07-14 RX ORDER — PANTOPRAZOLE SODIUM 20 MG/1
40 TABLET, DELAYED RELEASE ORAL
Qty: 0 | Refills: 0 | Status: DISCONTINUED | OUTPATIENT
Start: 2018-07-14 | End: 2018-07-14

## 2018-07-14 RX ORDER — HEPARIN SODIUM 5000 [USP'U]/ML
5000 INJECTION INTRAVENOUS; SUBCUTANEOUS EVERY 6 HOURS
Qty: 0 | Refills: 0 | Status: DISCONTINUED | OUTPATIENT
Start: 2018-07-14 | End: 2018-07-14

## 2018-07-14 RX ORDER — TRANYLCYPROMINE SULFATE 10 MG/1
10 TABLET, FILM COATED ORAL
Qty: 0 | Refills: 0 | Status: DISCONTINUED | OUTPATIENT
Start: 2018-07-14 | End: 2018-07-16

## 2018-07-14 RX ORDER — OXYCODONE AND ACETAMINOPHEN 5; 325 MG/1; MG/1
0.5 TABLET ORAL EVERY 8 HOURS
Qty: 0 | Refills: 0 | Status: DISCONTINUED | OUTPATIENT
Start: 2018-07-14 | End: 2018-07-16

## 2018-07-14 RX ORDER — PANTOPRAZOLE SODIUM 20 MG/1
1 TABLET, DELAYED RELEASE ORAL
Qty: 0 | Refills: 0 | COMMUNITY

## 2018-07-14 RX ORDER — PIPERACILLIN AND TAZOBACTAM 4; .5 G/20ML; G/20ML
4.5 INJECTION, POWDER, LYOPHILIZED, FOR SOLUTION INTRAVENOUS EVERY 12 HOURS
Qty: 0 | Refills: 0 | Status: DISCONTINUED | OUTPATIENT
Start: 2018-07-14 | End: 2018-07-14

## 2018-07-14 RX ORDER — HEPARIN SODIUM 5000 [USP'U]/ML
5000 INJECTION INTRAVENOUS; SUBCUTANEOUS EVERY 8 HOURS
Qty: 0 | Refills: 0 | Status: DISCONTINUED | OUTPATIENT
Start: 2018-07-14 | End: 2018-07-16

## 2018-07-14 RX ORDER — SODIUM CHLORIDE 9 MG/ML
1000 INJECTION INTRAMUSCULAR; INTRAVENOUS; SUBCUTANEOUS ONCE
Qty: 0 | Refills: 0 | Status: COMPLETED | OUTPATIENT
Start: 2018-07-14 | End: 2018-07-14

## 2018-07-14 RX ORDER — ALPRAZOLAM 0.25 MG
0.5 TABLET ORAL
Qty: 0 | Refills: 0 | Status: DISCONTINUED | OUTPATIENT
Start: 2018-07-14 | End: 2018-07-16

## 2018-07-14 RX ORDER — ASPIRIN/CALCIUM CARB/MAGNESIUM 324 MG
1 TABLET ORAL
Qty: 0 | Refills: 0 | COMMUNITY

## 2018-07-14 RX ORDER — METOPROLOL TARTRATE 50 MG
25 TABLET ORAL DAILY
Qty: 0 | Refills: 0 | Status: DISCONTINUED | OUTPATIENT
Start: 2018-07-14 | End: 2018-07-15

## 2018-07-14 RX ORDER — METOPROLOL TARTRATE 50 MG
1 TABLET ORAL
Qty: 0 | Refills: 0 | COMMUNITY

## 2018-07-14 RX ORDER — MAGNESIUM SULFATE 500 MG/ML
2 VIAL (ML) INJECTION ONCE
Qty: 0 | Refills: 0 | Status: COMPLETED | OUTPATIENT
Start: 2018-07-14 | End: 2018-07-14

## 2018-07-14 RX ORDER — IPRATROPIUM/ALBUTEROL SULFATE 18-103MCG
3 AEROSOL WITH ADAPTER (GRAM) INHALATION EVERY 6 HOURS
Qty: 0 | Refills: 0 | Status: DISCONTINUED | OUTPATIENT
Start: 2018-07-14 | End: 2018-07-16

## 2018-07-14 RX ORDER — ALBUTEROL 90 UG/1
2.5 AEROSOL, METERED ORAL ONCE
Qty: 0 | Refills: 0 | Status: COMPLETED | OUTPATIENT
Start: 2018-07-14 | End: 2018-07-14

## 2018-07-14 RX ORDER — HEPARIN SODIUM 5000 [USP'U]/ML
5000 INJECTION INTRAVENOUS; SUBCUTANEOUS ONCE
Qty: 0 | Refills: 0 | Status: DISCONTINUED | OUTPATIENT
Start: 2018-07-14 | End: 2018-07-14

## 2018-07-14 RX ORDER — SODIUM CHLORIDE 9 MG/ML
3 INJECTION INTRAMUSCULAR; INTRAVENOUS; SUBCUTANEOUS ONCE
Qty: 0 | Refills: 0 | Status: COMPLETED | OUTPATIENT
Start: 2018-07-14 | End: 2018-07-14

## 2018-07-14 RX ORDER — PIPERACILLIN AND TAZOBACTAM 4; .5 G/20ML; G/20ML
4.5 INJECTION, POWDER, LYOPHILIZED, FOR SOLUTION INTRAVENOUS ONCE
Qty: 0 | Refills: 0 | Status: DISCONTINUED | OUTPATIENT
Start: 2018-07-14 | End: 2018-07-14

## 2018-07-14 RX ADMIN — TRANYLCYPROMINE SULFATE 10 MILLIGRAM(S): 10 TABLET, FILM COATED ORAL at 23:23

## 2018-07-14 RX ADMIN — ALBUTEROL 2.5 MILLIGRAM(S): 90 AEROSOL, METERED ORAL at 11:10

## 2018-07-14 RX ADMIN — Medication 2.5 MILLIGRAM(S): at 22:58

## 2018-07-14 RX ADMIN — SODIUM CHLORIDE 250 MILLILITER(S): 9 INJECTION INTRAMUSCULAR; INTRAVENOUS; SUBCUTANEOUS at 21:58

## 2018-07-14 RX ADMIN — SODIUM CHLORIDE 3 MILLILITER(S): 9 INJECTION INTRAMUSCULAR; INTRAVENOUS; SUBCUTANEOUS at 12:06

## 2018-07-14 RX ADMIN — Medication 50 GRAM(S): at 23:38

## 2018-07-14 RX ADMIN — HEPARIN SODIUM 5000 UNIT(S): 5000 INJECTION INTRAVENOUS; SUBCUTANEOUS at 23:19

## 2018-07-14 RX ADMIN — PANTOPRAZOLE SODIUM 40 MILLIGRAM(S): 20 TABLET, DELAYED RELEASE ORAL at 19:20

## 2018-07-14 RX ADMIN — SODIUM CHLORIDE 1000 MILLILITER(S): 9 INJECTION INTRAMUSCULAR; INTRAVENOUS; SUBCUTANEOUS at 11:11

## 2018-07-14 RX ADMIN — AZITHROMYCIN 255 MILLIGRAM(S): 500 TABLET, FILM COATED ORAL at 13:00

## 2018-07-14 RX ADMIN — CEFTRIAXONE 100 GRAM(S): 500 INJECTION, POWDER, FOR SOLUTION INTRAMUSCULAR; INTRAVENOUS at 12:04

## 2018-07-14 RX ADMIN — PIPERACILLIN AND TAZOBACTAM 200 GRAM(S): 4; .5 INJECTION, POWDER, LYOPHILIZED, FOR SOLUTION INTRAVENOUS at 19:20

## 2018-07-14 RX ADMIN — Medication 100 MILLIGRAM(S): at 23:19

## 2018-07-14 RX ADMIN — SODIUM CHLORIDE 250 MILLILITER(S): 9 INJECTION INTRAMUSCULAR; INTRAVENOUS; SUBCUTANEOUS at 23:18

## 2018-07-14 RX ADMIN — Medication 3 MILLILITER(S): at 22:06

## 2018-07-14 RX ADMIN — SODIUM CHLORIDE 1000 MILLILITER(S): 9 INJECTION INTRAMUSCULAR; INTRAVENOUS; SUBCUTANEOUS at 16:04

## 2018-07-14 NOTE — H&P ADULT - ATTENDING COMMENTS
89 yo F with PMHx of  dementia, HTN, GERD, chronic pain, anxiety w/ panic attacks presented with chest congestion admitted with sepsis 2/2 pneumonia Aspiration vs CAP , failed outpatient tx with ceclor and levaquin (last dose thursday). S/p 2L NS. blood cultures pending. c/w vanc/zosyn. REGINALDO on CKD - renally dose vanc, continue with IVF- am bmp. New onset afib- cardio Shearer consulted- started on heparin drip- hold off on beta blocker at this time due to sepsis/hypotension. monitor on tele.  Lung finding on ct scan- scar vs neoplasm- d/w son. Dr. Messer consulted. 89 yo F with PMHx of  dementia, HTN, GERD, chronic pain, anxiety w/ panic attacks presented with chest congestion admitted with sepsis 2/2 pneumonia Aspiration vs CAP , failed outpatient tx with ceclor and levaquin (last dose thursday). S/p 2L NS. blood cultures pending. c/w vanc/zosyn. REGINALDO on CKD - renally dose vanc, continue with IVF- am bmp. New onset afib- cardio Shearer consulted- hold off on beta blocker at this time due to sepsis/hypotension. monitor on tele.  Lung finding on ct scan- scar vs neoplasm- d/w son. Dr. Messer consulted.

## 2018-07-14 NOTE — H&P ADULT - PROBLEM SELECTOR PLAN 8
IMPROVE VTE Individual Risk Assessment          RISK                                                          Points  [  ] Previous VTE                                                3  [  ] Thrombophilia                                             2  [  ] Lower limb paralysis                                   2        (unable to hold up >15 seconds)    [  ] Current Cancer                                            2         (within 6 months)  [  ] Immobilization > 24 hrs                              1  [  ] ICU/CCU stay > 24 hours                            1  [ x ] Age > 60                                                    1  IMPROVE VTE Score ___1______  DVT ppx: heparin subq daily Chronic. Continue reorient patient frequently Chronic. Continue reorient patient frequently  Patient's son states that she has been having soft diet at home. Will continue for now  Speech and swallow eval

## 2018-07-14 NOTE — H&P ADULT - ASSESSMENT
89yo F with PMHx of advanced dementia, HTN, GERD, chronic pain, anxiety w/ panic attacks presents with persistent chest congestion and sputum production x few weeks despite 2-courses of antibiotics, Ceclor and then Levaquin. Admit to F with 89yo F with PMHx of advanced dementia, HTN, GERD, chronic pain, anxiety w/ panic attacks presents with persistent chest congestion and sputum production x few weeks despite 2-courses of antibiotics, Ceclor and then Levaquin. Admit to [facility] with CAP, elevated lactate, REGINALDO on CKD. 89yo F with PMHx of advanced dementia, HTN, GERD, chronic pain, anxiety w/ panic attacks presents with persistent chest congestion and sputum production x few weeks despite 2-courses of antibiotics, Ceclor and then Levaquin. Admit to [facility] with CAP, new onset a fib, elevated lactate, REGINALDO on CKD. 89 yo F with PMHx of advanced dementia, HTN, GERD, chronic pain, anxiety w/ panic attacks presents with persistent chest congestion and sputum production x few weeks despite 2-courses of antibiotics, Ceclor and then Levaquin. Admit to [facility] with CAP, new onset a fib, elevated lactate, REGINALDO on CKD.

## 2018-07-14 NOTE — PROVIDER CONTACT NOTE (CRITICAL VALUE NOTIFICATION) - SITUATION
Dr. Wu notified/aware pts repeat lactate level is 3.9 Dr. Aguirreed notified/aware pts repeat lactate level is 3.9

## 2018-07-14 NOTE — ED PROVIDER NOTE - OBJECTIVE STATEMENT
89 yo white female with H/O HTN, Dementia and Spinal Stenosis who has had persistent chest congestion and sputum production x few weeks despite 2-courses of antibiotics, Ceclor and then Levaquin. Per son patient has not had and fever, nausea, vomiting or diarrhea. Does appear weaker to him. No additional history at this time.

## 2018-07-14 NOTE — ED ADULT TRIAGE NOTE - CHIEF COMPLAINT QUOTE
"She hasn't been eating or drinking well."  pt developed cough, was first on ceclor, then levaquin, cough persisted, PMD wanted CXR but pt did not go yet; son states pt has history of narrowing of esophagus, states pt has a lot of mucous and coughing, unable to tolerate PO

## 2018-07-14 NOTE — CHART NOTE - NSCHARTNOTEFT_GEN_A_CORE
Called by RN for Pt with lactate 3.9. Patient seen and examined at bedside. Patient has history of dementia, unable to appropriately answer questions. Appears to be mildly agitated.         T(C): 35.5 (18 @ 18:28), Max: 36.3 (18 @ 10:37)  HR: 78 (18 @ 22:08) (78 - 108)  BP: 110/55 (18 @ 18:28) (87/64 - 110/55)  RR: 18 (18 @ 18:28) (14 - 18)  SpO2: 96% (18 @ 22:08) (96% - 100%)  Wt(kg): --    Vitals: Temp __________ , /73, , afib on tele monitor, RR 16, SpO2 _______ on room air    Physical :  Gen- Mildly agitated, ncat  Cardio - s+1,s+2, tachycardic, no murmur  Lung - Coarse breath sounds throughout, no wheeze, no rhonchi, no rales   Abdomen- +BS, NT/ND, no guarding, no rebound, no masses  Ext- no edema, 2+ pulses b/l  Neuro- CN grossly intact, strength 5/5 b/l extrem, alert to person    LABS:                        11.0   14.85 )-----------( 261      ( 2018 11:38 )             32.8         134<L>  |  100  |  50<H>  ----------------------------<  117<H>  4.5   |  20<L>  |  2.30<H>    Ca    8.4<L>      2018 11:38    TPro  5.7<L>  /  Alb  2.8<L>  /  TBili  0.6  /  DBili  x   /  AST  22  /  ALT  16  /  AlkPhos  84  07-    PT/INR - ( 2018 18:10 )   PT: 21.4 sec;   INR: 1.94 ratio         PTT - ( 2018 18:10 )  PTT:42.3 sec  Urinalysis Basic - ( 2018 13:08 )    Color: Yellow / Appearance: Clear / S.010 / pH: x  Gluc: x / Ketone: Negative  / Bili: Negative / Urobili: Negative   Blood: x / Protein: Negative / Nitrite: Negative   Leuk Esterase: Trace / RBC: 0-2 /HPF / WBC 3-5   Sq Epi: x / Non Sq Epi: Occasional / Bacteria: x              Assessment/Plan  89 year old Female admitted for sepsis 2/2 pneumonia Aspiration vs CAP, now with worsening Lactate, likely multifactorial secondary to sepsis and rapid afib. Patient now also with 24 beats of V Tach according to telemetry monitor.   -250 cc bolus STAT over 60 minutes, give dose of Robitussin, give 1 duoneb  -Magnesium and Phos STAT added on to CMP which was just drawn.   -Aspiration precautions placed  -Discussed case with ICU PA who evaluated the patient, recommended   -EKG STAT __________  -Continue current antibotics for suspected PNA. Called by RN for Pt with lactate 3.9. Patient seen and examined at bedside. Patient has history of dementia, unable to appropriately answer questions. Appears to be mildly agitated.         T(C): 35.5 (18 @ 18:28), Max: 36.3 (18 @ 10:37)  HR: 78 (18 @ 22:08) (78 - 108)  BP: 110/55 (18 @ 18:28) (87/64 - 110/55)  RR: 18 (18 @ 18:28) (14 - 18)  SpO2: 96% (18 @ 22:08) (96% - 100%)  Wt(kg): --    Vitals: Temp __________ , /73, , afib on tele monitor, RR 16, SpO2 _______ on room air    Physical :  Gen- Mildly agitated, ncat  Cardio - s+1,s+2, tachycardic, no murmur  Lung - Coarse breath sounds throughout, no wheeze, no rhonchi, no rales   Abdomen- +BS, NT/ND, no guarding, no rebound, no masses  Ext- no edema, 2+ pulses b/l  Neuro- CN grossly intact, strength 5/5 b/l extrem, alert to person    LABS:                        11.0   14.85 )-----------( 261      ( 2018 11:38 )             32.8         134<L>  |  100  |  50<H>  ----------------------------<  117<H>  4.5   |  20<L>  |  2.30<H>    Ca    8.4<L>      2018 11:38    TPro  5.7<L>  /  Alb  2.8<L>  /  TBili  0.6  /  DBili  x   /  AST  22  /  ALT  16  /  AlkPhos  84  07-    PT/INR - ( 2018 18:10 )   PT: 21.4 sec;   INR: 1.94 ratio         PTT - ( 2018 18:10 )  PTT:42.3 sec  Urinalysis Basic - ( 2018 13:08 )    Color: Yellow / Appearance: Clear / S.010 / pH: x  Gluc: x / Ketone: Negative  / Bili: Negative / Urobili: Negative   Blood: x / Protein: Negative / Nitrite: Negative   Leuk Esterase: Trace / RBC: 0-2 /HPF / WBC 3-5   Sq Epi: x / Non Sq Epi: Occasional / Bacteria: x              Assessment/Plan  89 year old Female admitted for sepsis 2/2 pneumonia Aspiration vs CAP, now with worsening Lactate, likely multifactorial secondary to sepsis and rapid afib. Patient now also with 24 beats of V Tach according to telemetry monitor.   -250 cc bolus STAT over 60 minutes, give dose of Robitussin, give 1 duoneb  -Magnesium and Phos STAT added on to CMP which was just drawn.   -Aspiration precautions placed  -Discussed case with ICU PA who evaluated the patient, recommended   -EKG STAT __________  -Continue current antibotics for suspected PNA.  -Case discussed with attending Dr. Jerome, senior resident, and ICU PA. Agree with plan. Called by RN for Pt with lactate 3.9. Patient seen and examined at bedside. Patient has history of dementia, unable to appropriately answer questions. Appears to be mildly agitated.         T(C): 35.5 (18 @ 18:28), Max: 36.3 (18 @ 10:37)  HR: 78 (18 @ 22:08) (78 - 108)  BP: 110/55 (18 @ 18:28) (87/64 - 110/55)  RR: 18 (18 @ 18:28) (14 - 18)  SpO2: 96% (18 @ 22:08) (96% - 100%)  Wt(kg): --    Vitals: Temp __________ , /73, , afib on tele monitor, RR 16, SpO2 _______ on room air    Physical :  Gen- Mildly agitated, ncat  Cardio - s+1,s+2, tachycardic, no murmur  Lung - Coarse breath sounds throughout, no wheeze, no rhonchi, no rales   Abdomen- +BS, NT/ND, no guarding, no rebound, no masses  Ext- no edema, 2+ pulses b/l  Neuro- CN grossly intact, strength 5/5 b/l extrem, alert to person    LABS:                        11.0   14.85 )-----------( 261      ( 2018 11:38 )             32.8         134<L>  |  100  |  50<H>  ----------------------------<  117<H>  4.5   |  20<L>  |  2.30<H>    Ca    8.4<L>      2018 11:38    TPro  5.7<L>  /  Alb  2.8<L>  /  TBili  0.6  /  DBili  x   /  AST  22  /  ALT  16  /  AlkPhos  84  07-    PT/INR - ( 2018 18:10 )   PT: 21.4 sec;   INR: 1.94 ratio         PTT - ( 2018 18:10 )  PTT:42.3 sec  Urinalysis Basic - ( 2018 13:08 )    Color: Yellow / Appearance: Clear / S.010 / pH: x  Gluc: x / Ketone: Negative  / Bili: Negative / Urobili: Negative   Blood: x / Protein: Negative / Nitrite: Negative   Leuk Esterase: Trace / RBC: 0-2 /HPF / WBC 3-5   Sq Epi: x / Non Sq Epi: Occasional / Bacteria: x              Assessment/Plan  89 year old Female admitted for sepsis 2/2 pneumonia Aspiration vs CAP, now with worsening Lactate, likely multifactorial secondary to sepsis and rapid afib. Patient now also with 24 beats of V Tach according to telemetry monitor.   -250 cc bolus STAT over 60 minutes, give dose of Robitussin, give 1 duoneb  -Repeat lactate 1 AM  -Magnesium and Phos STAT added on to CMP which was just drawn.   -Aspiration precautions placed  -Discussed case with ICU PA who evaluated the patient, recommended   -EKG STAT __________  -Continue current antibotics for suspected PNA.  -Case discussed with attending Dr. Jerome, senior resident, and ICU PA. Agree with plan. Called by RN for Pt with lactate 3.9. Patient seen and examined at bedside. Patient has history of dementia, unable to appropriately answer questions. Appears to be mildly agitated.         T(C): 35.5 (18 @ 18:28), Max: 36.3 (18 @ 10:37)  HR: 78 (18 @ 22:08) (78 - 108)  BP: 110/55 (18 @ 18:28) (87/64 - 110/55)  RR: 18 (18 @ 18:28) (14 - 18)  SpO2: 96% (18 @ 22:08) (96% - 100%)  Wt(kg): --    Vitals: Temp 93.0 , /73, , afib on tele monitor, RR 16, SpO2 ____________    Physical :  Gen- Mildly agitated, ncat  Cardio - s+1,s+2, tachycardic, no murmur  Lung - Coarse breath sounds throughout, no wheeze, no rhonchi, no rales   Abdomen- +BS, NT/ND, no guarding, no rebound, no masses  Ext- no edema, 2+ pulses b/l  Neuro- CN grossly intact, strength 5/5 b/l extrem, alert to person    LABS:                        11.0   14.85 )-----------( 261      ( 2018 11:38 )             32.8     07    134<L>  |  100  |  50<H>  ----------------------------<  117<H>  4.5   |  20<L>  |  2.30<H>    Ca    8.4<L>      2018 11:38    TPro  5.7<L>  /  Alb  2.8<L>  /  TBili  0.6  /  DBili  x   /  AST  22  /  ALT  16  /  AlkPhos  84  07-14    PT/INR - ( 2018 18:10 )   PT: 21.4 sec;   INR: 1.94 ratio         PTT - ( 2018 18:10 )  PTT:42.3 sec  Urinalysis Basic - ( 2018 13:08 )    Color: Yellow / Appearance: Clear / S.010 / pH: x  Gluc: x / Ketone: Negative  / Bili: Negative / Urobili: Negative   Blood: x / Protein: Negative / Nitrite: Negative   Leuk Esterase: Trace / RBC: 0-2 /HPF / WBC 3-5   Sq Epi: x / Non Sq Epi: Occasional / Bacteria: x              Assessment/Plan  89 year old Female admitted for sepsis 2/2 pneumonia Aspiration vs CAP. Patient now with worsening Lactate, hypothermia, new Vtach/RVR afib on tele monitor, likely multifactorial secondary to severe sepsis and rapid afib, ischemia less likely.    -250 cc bolus STAT over 60 minutes x 2.  -Give dose of Robitussin, give 1 duoneb for respiratory secretions  -Repeat lactate 1 AM after fluid resuscitation. Monitor for any signs of respiratory distress.   -Aspiration precautions placed  -Magnesium and Phos STAT added on to CMP which was just drawn.   -Discussed case with ICU PA and requested formal ICU evaluation.  -EKG STAT for RVR afib  _________  -STAT 2.5 mg IVP Metoprolol x 1. HR improved to ______________  -Warming blanket for hypothermia likely 2/2 sepsis  -Continue current antibiotics for suspected PNA.  -Continue to monitor closely, RN to call with any changes. Plan discussed with RN.  -Case discussed with attending Dr. Jerome, senior resident, and ICU PA. Agree with plan. Called by RN for Pt with lactate 3.9. Patient seen and examined at bedside. Patient has history of dementia, unable to appropriately answer questions. Appears to be mildly agitated.         T(C): 35.5 (18 @ 18:28), Max: 36.3 (18 @ 10:37)  HR: 78 (18 @ 22:08) (78 - 108)  BP: 110/55 (18 @ 18:28) (87/64 - 110/55)  RR: 18 (18 @ 18:28) (14 - 18)  SpO2: 96% (18 @ 22:08) (96% - 100%)  Wt(kg): --    Vitals: Temp 93.0 , /73, , afib on tele monitor, RR 16, SpO2 ____________    Physical :  Gen- Mildly agitated, ncat  Cardio - s+1,s+2, tachycardic, no murmur  Lung - Coarse breath sounds throughout, no wheeze, no rhonchi, no rales   Abdomen- +BS, NT/ND, no guarding, no rebound, no masses  Ext- no edema, 2+ pulses b/l  Neuro- CN grossly intact, strength 5/5 b/l extrem, alert to person    LABS:                        11.0   14.85 )-----------( 261      ( 2018 11:38 )             32.8     07    134<L>  |  100  |  50<H>  ----------------------------<  117<H>  4.5   |  20<L>  |  2.30<H>    Ca    8.4<L>      2018 11:38    TPro  5.7<L>  /  Alb  2.8<L>  /  TBili  0.6  /  DBili  x   /  AST  22  /  ALT  16  /  AlkPhos  84  07-14    PT/INR - ( 2018 18:10 )   PT: 21.4 sec;   INR: 1.94 ratio         PTT - ( 2018 18:10 )  PTT:42.3 sec  Urinalysis Basic - ( 2018 13:08 )    Color: Yellow / Appearance: Clear / S.010 / pH: x  Gluc: x / Ketone: Negative  / Bili: Negative / Urobili: Negative   Blood: x / Protein: Negative / Nitrite: Negative   Leuk Esterase: Trace / RBC: 0-2 /HPF / WBC 3-5   Sq Epi: x / Non Sq Epi: Occasional / Bacteria: x              Assessment/Plan  89 year old Female admitted for sepsis 2/2 pneumonia Aspiration vs CAP. Patient now with worsening Lactate, hypothermia, new Vtach/RVR afib on tele monitor, likely multifactorial secondary to severe sepsis and rapid afib, ischemia less likely.    -250 cc bolus STAT over 60 minutes x 2.  -Give dose of Robitussin, give 1 duoneb for respiratory secretions  -Repeat lactate 1 AM after fluid resuscitation. Monitor for any signs of respiratory distress.   -Aspiration precautions placed  -Magnesium and Phos STAT added on to CMP which was just drawn.   -Discussed case with ICU PA and requested formal ICU evaluation.  -EKG STAT for RVR afib  _________  -STAT 2.5 mg IVP Metoprolol x 1. HR improved to 104 afib.   -Warming blanket for hypothermia likely 2/2 sepsis  -Continue current antibiotics for suspected PNA.  -Continue to monitor closely, RN to call with any changes. Plan discussed with RN.  -Case discussed with attending Dr. Jerome, senior resident, and ICU PA. Agree with plan. Called by RN for Pt with lactate 3.9. Patient seen and examined at bedside. Patient has history of dementia, unable to appropriately answer questions. Appears to be mildly agitated.         T(C): 35.5 (18 @ 18:28), Max: 36.3 (18 @ 10:37)  HR: 78 (18 @ 22:08) (78 - 108)  BP: 110/55 (18 @ 18:28) (87/64 - 110/55)  RR: 18 (18 @ 18:28) (14 - 18)  SpO2: 96% (18 @ 22:08) (96% - 100%)  Wt(kg): --    Vitals: Temp 93.0 , /73, , afib on tele monitor, RR 16    Physical :  Gen- Mildly agitated, ncat  Cardio - s+1,s+2, tachycardic, no murmur  Lung - Coarse breath sounds throughout, no wheeze, no rhonchi, no rales   Abdomen- +BS, NT/ND, no guarding, no rebound, no masses  Ext- no edema, 2+ pulses b/l  Neuro- CN grossly intact, strength 5/5 b/l extrem, alert to person    LABS:                        11.0   14.85 )-----------( 261      ( 2018 11:38 )             32.8         134<L>  |  100  |  50<H>  ----------------------------<  117<H>  4.5   |  20<L>  |  2.30<H>    Ca    8.4<L>      2018 11:38    TPro  5.7<L>  /  Alb  2.8<L>  /  TBili  0.6  /  DBili  x   /  AST  22  /  ALT  16  /  AlkPhos  84      PT/INR - ( 2018 18:10 )   PT: 21.4 sec;   INR: 1.94 ratio         PTT - ( 2018 18:10 )  PTT:42.3 sec  Urinalysis Basic - ( 2018 13:08 )    Color: Yellow / Appearance: Clear / S.010 / pH: x  Gluc: x / Ketone: Negative  / Bili: Negative / Urobili: Negative   Blood: x / Protein: Negative / Nitrite: Negative   Leuk Esterase: Trace / RBC: 0-2 /HPF / WBC 3-5   Sq Epi: x / Non Sq Epi: Occasional / Bacteria: x              Assessment/Plan  89 year old Female admitted for sepsis 2/2 pneumonia Aspiration vs CAP. Patient now with worsening Lactate, hypothermia, new Vtach/RVR afib on tele monitor, likely multifactorial secondary to severe sepsis and rapid afib, ischemia less likely.    -250 cc bolus STAT over 60 minutes x 2.  -Give dose of Robitussin, give 1 duoneb for respiratory secretions  -Repeat lactate 1 AM after fluid resuscitation. Monitor for any signs of respiratory distress.   -Aspiration precautions placed  -Magnesium and Phos STAT added on to CMP which was just drawn. MAgnesium low at 1.5, will replete with 2 grams IVPB x 1, this likely contributed also to the Vtach.   -Discussed case with ICU PA and requested formal ICU evaluation.  -EKG STAT for RVR afib , RBBB, popssible anterior infarct - ekg was generally the same as admission earlier today.   -STAT 2.5 mg IVP Metoprolol x 1. HR improved to 98 afib.   -Warming blanket for hypothermia likely 2/2 sepsis  -Continue current antibiotics for suspected PNA.  -Continue to monitor closely, RN to call with any changes. Plan discussed with RN.  -Case discussed with attending Dr. Jerome, senior resident, and ICU PA. Agree with plan. Called by RN for Pt with lactate 3.9. Patient seen and examined at bedside. Patient has history of dementia, unable to appropriately answer questions. Appears to be mildly agitated.         T(C): 35.5 (18 @ 18:28), Max: 36.3 (18 @ 10:37)  HR: 78 (18 @ 22:08) (78 - 108)  BP: 110/55 (18 @ 18:28) (87/64 - 110/55)  RR: 18 (18 @ 18:28) (14 - 18)  SpO2: 96% (18 @ 22:08) (96% - 100%)  Wt(kg): --    Vitals: Temp 93.0 , /73, , afib on tele monitor, RR 16, SpO2 99%    Physical :  Gen- Mildly agitated, ncat  Cardio - s+1,s+2, tachycardic, no murmur  Lung - Coarse breath sounds throughout, no wheeze, no rhonchi, no rales   Abdomen- +BS, NT/ND, no guarding, no rebound, no masses  Ext- no edema, 2+ pulses b/l  Neuro- CN grossly intact, strength 5/5 b/l extrem, alert to person    LABS:                        11.0   14. )-----------( 261      ( 2018 11:38 )             32.8         134<L>  |  100  |  50<H>  ----------------------------<  117<H>  4.5   |  20<L>  |  2.30<H>    Ca    8.4<L>      2018 11:38    TPro  5.7<L>  /  Alb  2.8<L>  /  TBili  0.6  /  DBili  x   /  AST  22  /  ALT  16  /  AlkPhos  84      PT/INR - ( 2018 18:10 )   PT: 21.4 sec;   INR: 1.94 ratio         PTT - ( 2018 18:10 )  PTT:42.3 sec  Urinalysis Basic - ( 2018 13:08 )    Color: Yellow / Appearance: Clear / S.010 / pH: x  Gluc: x / Ketone: Negative  / Bili: Negative / Urobili: Negative   Blood: x / Protein: Negative / Nitrite: Negative   Leuk Esterase: Trace / RBC: 0-2 /HPF / WBC 3-5   Sq Epi: x / Non Sq Epi: Occasional / Bacteria: x              Assessment/Plan  89 year old Female admitted for sepsis 2/2 pneumonia Aspiration vs CAP. Patient now with worsening Lactate, hypothermia, new Vtach/RVR afib on tele monitor, likely multifactorial secondary to severe sepsis and rapid afib, ischemia less likely.    -250 cc bolus STAT over 60 minutes x 2.  -Give dose of Robitussin, give 1 duoneb for respiratory secretions  -Repeat lactate 1 AM after fluid resuscitation. Monitor for any signs of respiratory distress.   -Aspiration precautions placed  -Magnesium and Phos STAT added on to CMP which was just drawn. MAgnesium low at 1.5, will replete with 2 grams IVPB x 1, this likely contributed also to the Vtach.   -Discussed case with ICU PA and requested formal ICU evaluation.  -EKG STAT for RVR afib , RBBB, popssible anterior infarct - ekg was generally the same as admission earlier today.   -STAT 2.5 mg IVP Metoprolol x 1. HR improved to 98 afib.   -Warming blanket for hypothermia likely 2/2 sepsis  -Continue current antibiotics for suspected PNA.  -Continue to monitor closely, RN to call with any changes. Plan discussed with RN.  -Case discussed with attending Dr. Jerome, senior resident, and ICU PA. Agree with plan.

## 2018-07-14 NOTE — CONSULT NOTE ADULT - SUBJECTIVE AND OBJECTIVE BOX
CHIEF COMPLAINT: Patient is a 89y old  Female who presents with a chief complaint of cough, congestion (14 Jul 2018 13:52)      HPI: Limited to chart review 2/2 dementia  89yo F with PMHx of  dementia, HTN, GERD, chronic pain, anxiety w/ panic attacks presents with chest congestion. Patient's son, health care proxy Daron at bedside. Unable to obtain hx from patient due to mental status. Son states that patient has had persistent chest congestion and sputum production x few weeks despite 2-courses of antibiotics, Ceclor (for 5 days) and then Levaquin (for 7 days, last dose taken Thursday July 12, 2018). Son states that he was told by PCP that patient needs chest xray, however was unable to obtain before coming to ED. Son also reports patient has had poor appetite and increased confusion for the past two weeks. Per son patient has not had and fever, nausea, vomiting or diarrhea. No additional history at this time    In the ED, VS temp 97.4, , BP 97/66, RR 14. WBC 14.85, Hg 11.0, Hct 32.8, platelet 261, neutrophil % 80.8, Na 134, K 4.5, Cl 100, Co2 20, BUN 50, Cr 2.30, lactate, procalcitonin 0.15, UA trace leukocyte esterase.    Imaging:   CT chest no cont Small area of peripheral airspace disease at the right lung base. Spiculated density left upper lobe, scar versus neoplasm  CXR: grossly clear lungs.  EKG:  afib with rvr at 157 bpm    In the ED, received duoneb x 1, NS 1L bolus x 1, another NS 1L bolus ordered, azithromycin 500 mg x 1, ceftriaxone 1 g x 1 (14 Jul 2018 13:52)    Son states that she has never had Af before.     EKG: < from: 12 Lead ECG (07.14.18 @ 10:58) >  Poor data quality  Atrial fibrillation with rapid ventricular response with premature ventricular or aberrantly conducted complexes  Right superior axis deviation  Incomplete right bundle branch block  Possible Right ventricular hypertrophy  ST & T wave abnormality, consider anterior ischemia  Abnormal ECG    < end of copied text >      REVIEW OF SYSTEMS:   Limited 2/2 comorbidities     PAST MEDICAL & SURGICAL HISTORY:  Anxiety  Spinal stenosis  HTN (hypertension)  Dementia  H/O left mastectomy      SOCIAL HISTORY:  No tobacco, ethanol, or drug abuse.    FAMILY HISTORY:  No pertinent family history in first degree relatives    No family history of acute MI or sudden cardiac death.    MEDICATIONS  (STANDING):  heparin  Infusion.  Unit(s)/Hr (11 mL/Hr) IV Continuous <Continuous>  heparin  Injectable 5000 Unit(s) IV Push once  metoprolol tartrate 25 milliGRAM(s) Oral daily  NIFEdipine IR 10 milliGRAM(s) Oral two times a day  pantoprazole    Tablet 40 milliGRAM(s) Oral two times a day  piperacillin/tazobactam IVPB. 3.375 Gram(s) IV Intermittent every 12 hours  tranylcypromine 10 milliGRAM(s) Oral <User Schedule>  vancomycin  IVPB 1000 milliGRAM(s) IV Intermittent once    MEDICATIONS  (PRN):  ALPRAZolam 0.5 milliGRAM(s) Oral four times a day PRN anxiety  heparin  Injectable 5000 Unit(s) IV Push every 6 hours PRN For aPTT less than 40  heparin  Injectable 2500 Unit(s) IV Push every 6 hours PRN For aPTT between 40 - 57  oxyCODONE    5 mG/acetaminophen 325 mG 0.5 Tablet(s) Oral every 8 hours PRN Moderate Pain (4 - 6)      Allergies    No Known Allergies    Intolerances        Home meds:  Home Medications:  ALPRAZolam 0.5 mg oral tablet: 1 tab(s) orally 4 times a day, As Needed (14 Jul 2018 17:25)  metoprolol tartrate 25 mg oral tablet: 1 tab(s) orally once a day (14 Jul 2018 17:25)  pantoprazole 40 mg oral granule, enteric coated: 1 each orally 2 times a day (14 Jul 2018 17:25)  Parnate 10 mg oral tablet: 1 tab(s) orally 3 times a day (14 Jul 2018 17:25)  Procardia 10 mg oral capsule: orally 2 times a day (14 Jul 2018 17:25)  Vicodin 5 mg-300 mg oral tablet: 1 tab(s) orally every 4 hours (14 Jul 2018 17:25)        VITAL SIGNS:   Vital Signs Last 24 Hrs  T(C): 36.3 (14 Jul 2018 10:37), Max: 36.3 (14 Jul 2018 10:37)  T(F): 97.4 (14 Jul 2018 10:37), Max: 97.4 (14 Jul 2018 10:37)  HR: 108 (14 Jul 2018 12:30) (108 - 108)  BP: 97/66 (14 Jul 2018 12:30) (87/64 - 97/66)  BP(mean): --  RR: 14 (14 Jul 2018 10:37) (14 - 14)  SpO2: --    I&O's Summary      On Exam:     Constitutional: restless  HEENT: Dry Mucous Membranes, Anicteric  Pulmonary: Decreased breath sounds b/l. No rales, crackles or wheeze appreciated.   Cardiovascular: IRRR tachy, S1 and S2,  1/6 SM  Gastrointestinal: Bowel Sounds present, soft, nontender.   Lymph: No peripheral edema. No lymphadenopathy.  Skin: No visible rashes or ulcers.  Psych:  Mood & affect appropriate    LABS: All Labs Reviewed:                        11.0   14.85 )-----------( 261      ( 14 Jul 2018 11:38 )             32.8     14 Jul 2018 11:38    134    |  100    |  50     ----------------------------<  117    4.5     |  20     |  2.30     Ca    8.4        14 Jul 2018 11:38    TPro  5.7    /  Alb  2.8    /  TBili  0.6    /  DBili  x      /  AST  22     /  ALT  16     /  AlkPhos  84     14 Jul 2018 11:38          Blood Culture:         RADIOLOGY:    < from: CT Chest No Cont (07.14.18 @ 12:39) >    EXAM:  CT CHEST                            PROCEDURE DATE:  07/14/2018          INTERPRETATION:  Clinical information: Cough    No prior chest CT studies present for comparison    Axial images obtained, coronal and sagittal images computer reformatted.   Noncontrast study limits evaluation of hilar and mediastinal regions.    The patient is rotated towards the right.    No pericardial effusion or thoracic aortic aneurysm. No mediastinal   lesions or hilar lesions identified, limited evaluation due to lack of IV   contrast.    Small area of peripheral airspace disease right lung base. No effusion.   No vascular congestion. No pneumothorax. Minimal bullous changes right   lower lobe.    Focal spiculated airspace disease in the left upper lobemeasuring 12 mm,   could represent a scar but cannot exclude neoplasm.     Trace groundglass opacity both lung apices.    Central airway intact. Thyroid gland not enlarged.    No adrenal lesions. The spleen is not enlarged. No hydronephrotic   changes. No acute appearing osseous abnormalities.      IMPRESSION: Small area of peripheral airspace disease at the right lung   base    Spiculated density left upper lobe, scar versus neoplasm.    See additional findings as described above.                MARCO HOFFMAN M.D.,ATTENDING RADIOLOGIST  This document has been electronically signed. Jul 14 2018 12:48PM                < end of copied text >

## 2018-07-14 NOTE — ED PROVIDER NOTE - CARE PLAN
Principal Discharge DX:	Pneumonia of right lower lobe due to infectious organism  Secondary Diagnosis:	REGINALDO (acute kidney injury)

## 2018-07-14 NOTE — H&P ADULT - PROBLEM SELECTOR PLAN 2
Lactate 2.3. Likely 2/2 PNA  Received 1L NS x 1. To receive 1 more L Lactate 2.3. Likely 2/2 PNA  Received 1L NS x 1. To receive 1 more liter   F/u repeat lactate New onset afib on EKG in ED  CHADVASC score of 4  Dr. Shearer cardio spoke to son and agrees to start patient on AC. WIll start patient on heparin drip  Continue asa 81 mg   Cardio Dr Shearer consulted New onset afib on EKG in ED  CHADVASC score of 4  Dr. Shearer cardio spoke to son and agrees to start patient on AC. WIll start patient on heparin drip  start asa 81 mg   Cardio Dr Shearer consulted New onset rapid afib on EKG in ED  CHADVASC score of 4  Dr. Shearer cardio spoke to son and agrees to start patient on AC. WIll start patient on heparin drip  start asa 81 mg   Monitor on tele  Cardio Dr Shearer consulted New onset rapid afib on EKG in ED  CHADVASC score of 4  Dr. Shearer cardio spoke to son who agrees that risks of starting ac outweigh benefits. No AC for now.   start asa 81 mg   Monitor on tele  Cardio Dr Shearer consulted

## 2018-07-14 NOTE — H&P ADULT - HISTORY OF PRESENT ILLNESS
89yo F with PMHx of  dementia, HTN, GERD, chronic pain, anxiety w/ panic attacks     who has had persistent chest congestion and sputum production x few weeks despite 2-courses of antibiotics, Ceclor and then Levaquin. Per son patient has not had and fever, nausea, vomiting or diarrhea. Does appear weaker to him. No additional history at this time    In the ED, VS temp 97.4, , BP 97/66, RR 14. WBC 14.85, Hg 11.0, Hct 32.8, platelet 261, neutrophil % 80.8, Na 134, K 4.5, Cl 100, Co2 20, BUN 50, Cr 2.30, lactate, procalcitonin 0.15, UA trace leukocyte esterase.    Imaging:   CT chest no cont Small area of peripheral airspace disease at the right lung   base. Spiculated density left upper lobe, scar versus neoplasm  CXR: grossly clear lungs.    In the ED, received duoneb x 1, NS 1L bolus x 1, another NS 1L bolus ordered, azithromycin 500 mg x 1, ceftriaxone 1 g x 1 89yo F with PMHx of  dementia, HTN, GERD, chronic pain, anxiety w/ panic attacks     who has had persistent chest congestion and sputum production x few weeks despite 2-courses of antibiotics, Ceclor and then Levaquin. Per son patient has not had and fever, nausea, vomiting or diarrhea. Does appear weaker to him. No additional history at this time    In the ED, VS temp 97.4, , BP 97/66, RR 14. WBC 14.85, Hg 11.0, Hct 32.8, platelet 261, neutrophil % 80.8, Na 134, K 4.5, Cl 100, Co2 20, BUN 50, Cr 2.30, lactate, procalcitonin 0.15, UA trace leukocyte esterase.    Imaging:   CT chest no cont Small area of peripheral airspace disease at the right lung base. Spiculated density left upper lobe, scar versus neoplasm  CXR: grossly clear lungs.    In the ED, received duoneb x 1, NS 1L bolus x 1, another NS 1L bolus ordered, azithromycin 500 mg x 1, ceftriaxone 1 g x 1 89yo F with PMHx of  dementia, HTN, GERD, chronic pain, anxiety w/ panic attacks presents with chest congestion. Patient's son, health care proxy Daron at bedside. Unable to obtain hx from patient due to mental status. Son states that patient has had persistent chest congestion and sputum production x few weeks despite 2-courses of antibiotics, Ceclor (for 5 days) and then Levaquin (for 7 days, last dose taken Thursday July 12, 2018). Son states that he was told by PCP that patient needs chest xray, however was unable to obtain before coming to ED. Son also reports patient has had poor appetite and increased confusion for the past two weeks. Per son patient has not had and fever, nausea, vomiting or diarrhea. No additional history at this time    In the ED, VS temp 97.4, , BP 97/66, RR 14. WBC 14.85, Hg 11.0, Hct 32.8, platelet 261, neutrophil % 80.8, Na 134, K 4.5, Cl 100, Co2 20, BUN 50, Cr 2.30, lactate, procalcitonin 0.15, UA trace leukocyte esterase.    Imaging:   CT chest no cont Small area of peripheral airspace disease at the right lung base. Spiculated density left upper lobe, scar versus neoplasm  CXR: grossly clear lungs.  EKG:  afib with rvr at 157 bpm    In the ED, received duoneb x 1, NS 1L bolus x 1, another NS 1L bolus ordered, azithromycin 500 mg x 1, ceftriaxone 1 g x 1 89 yo F with PMHx of  dementia, HTN, GERD, chronic pain, anxiety w/ panic attacks presents with chest congestion. Patient's son, health care proxy Daron at bedside. Unable to obtain hx from patient due to mental status. Son states that patient has had persistent chest congestion and sputum production x few weeks despite 2-courses of antibiotics, Ceclor (for 5 days) and then Levaquin (for 7 days, last dose taken Thursday July 12, 2018). Son states that he was told by PCP that patient needs chest xray, however was unable to obtain before coming to ED. Son also reports patient has had poor appetite and increased confusion for the past two weeks. Per son patient has not had and fever, nausea, vomiting or diarrhea. No additional history at this time    In the ED, VS temp 97.4, , BP 97/66, RR 14. WBC 14.85, Hg 11.0, Hct 32.8, platelet 261, neutrophil % 80.8, Na 134, K 4.5, Cl 100, Co2 20, BUN 50, Cr 2.30, lactate, procalcitonin 0.15, UA trace leukocyte esterase.    Imaging:   CT chest no cont Small area of peripheral airspace disease at the right lung base. Spiculated density left upper lobe, scar versus neoplasm  CXR: grossly clear lungs.  EKG:  afib with rvr at 157 bpm    In the ED, received duoneb x 1, NS 1L bolus x 1, another NS 1L bolus ordered, azithromycin 500 mg x 1, ceftriaxone 1 g x 1 89 yo F with PMHx of  dementia, HTN, GERD, chronic pain, anxiety w/ panic attacks presents with chest congestion. Patient's son, health care proxy Daron at bedside. Unable to obtain hx from patient due to mental status. Son states that patient has had persistent chest congestion and sputum production x few weeks despite 2-courses of antibiotics, Ceclor (for 5 days) and then Levaquin (for 7 days, last dose taken Thursday July 12, 2018). Son states that he was told by PCP that patient needs chest xray, however was unable to obtain before coming to ED. Son also reports patient has had poor appetite and increased confusion for the past two weeks. Per son patient has not had and fever, nausea, vomiting or diarrhea. No additional history at this time    In the ED, VS temp 97.4, , BP 97/66, , RR 14, SpO2 100. WBC 14.85, Hg 11.0, Hct 32.8, platelet 261, neutrophil % 80.8, Na 134, K 4.5, Cl 100, Co2 20, BUN 50, Cr 2.30, lactate, procalcitonin 0.15, UA trace leukocyte esterase.    Imaging:   CT chest no cont Small area of peripheral airspace disease at the right lung base. Spiculated density left upper lobe, scar versus neoplasm  CXR: grossly clear lungs.  EKG:  afib with rvr at 157 bpm    In the ED, received duoneb x 1, NS 1L bolus x 1, another NS 1L bolus ordered, azithromycin 500 mg x 1, ceftriaxone 1 g x 1

## 2018-07-14 NOTE — H&P ADULT - NSHPSOCIALHISTORY_GEN_ALL_CORE
never smoker  never drinker  never recreational drug user  cannot perform any ADL's, relies on son for assistance w/ all ADL's  consumes soft diet w/ thin liquids at home  Patient lives at home with son and does not use assistive devices.     Family Hx: son states that there is no premature CAD, DM2, CVA in immediate family members never smoker  never drinker  never recreational drug user  cannot perform any ADL's, relies on son for assistance w/ all ADL's  consumes soft diet w/ thin liquids at home  Patient lives at home with son and does not use assistive devices.     Son states patient's wishes were to be DNR/DNI. Patient's son to bring in MOLST form from home    Family Hx: son states that there is no premature CAD, DM2, CVA in immediate family members

## 2018-07-14 NOTE — H&P ADULT - PROBLEM SELECTOR PLAN 4
Continue home dose xanax and parnate Likely 2/2 dehydration   Continue IVF   F/u repeat BMP in am Likely 2/2 dehydration and PO intake   Continue IVF   F/u repeat BMP in am

## 2018-07-14 NOTE — H&P ADULT - PROBLEM SELECTOR PLAN 3
REGINALDO on CKD  Now s/p NS bolus x 1. To receive another NS bolus   F/u repeat BMP in am REGINALDO on CKD, likely 2/2 volume depletion in setting of sepsis  Now s/p NS bolus x 1. To receive another NS bolus   F/u repeat BMP in am REGINALDO on CKD, likely 2/2 volume depletion in setting of sepsis  Now s/p NS bolus x 1. To receive another NS bolus   F/u repeat BMP in am  avoid nephrotoxic agents  Dose vanc daily

## 2018-07-14 NOTE — ED ADULT NURSE NOTE - OBJECTIVE STATEMENT
pt to ed from home c/o shortness of breath, pt took 2 rounds of PO meds but c/o excess mucous in her throat, pt is alert but confused, with skin intact & aide at bedside, [place] & unable to tolerate PO intake, afebrile.

## 2018-07-14 NOTE — H&P ADULT - PROBLEM SELECTOR PLAN 10
IMPROVE VTE Individual Risk Assessment          RISK                                                          Points  [  ] Previous VTE                                                3  [  ] Thrombophilia                                             2  [  ] Lower limb paralysis                                   2        (unable to hold up >15 seconds)    [  ] Current Cancer                                            2         (within 6 months)  [  ] Immobilization > 24 hrs                              1  [  ] ICU/CCU stay > 24 hours                            1  [ x ] Age > 60                                                    1  IMPROVE VTE Score ___1______  DVT ppx: heparin drip IMPROVE VTE Individual Risk Assessment          RISK                                                          Points  [  ] Previous VTE                                                3  [  ] Thrombophilia                                             2  [  ] Lower limb paralysis                                   2        (unable to hold up >15 seconds)    [  ] Current Cancer                                            2         (within 6 months)  [  ] Immobilization > 24 hrs                              1  [  ] ICU/CCU stay > 24 hours                            1  [ x ] Age > 60                                                    1  IMPROVE VTE Score ___1______  DVT ppx: heparin subq

## 2018-07-14 NOTE — H&P ADULT - PROBLEM SELECTOR PLAN 1
Likely CAP  Admit to F  Received azithro and rocephin in the ED. Will continue  CT chest no cont Small area of peripheral airspace disease at the right lung   base. Spiculated density left upper lobe, scar versus neoplasm.   Patient denies hx of neoplasm.  Pulm Dr Messer  Leukocytosis likely 2/2 pna  Fuentes prn  Deshawnutwalein for cough  F/u blood cx Likely CAP  Admit to F  Received azithro and rocephin in the ED. Will continue  CT chest no cont Small area of peripheral airspace disease at the right lung   base. Spiculated density left upper lobe, scar versus neoplasm.   Patient denies hx of neoplasm.  Pulm Dr Messer  Leukocytosis likely 2/2 pna  Duonebs prn  Robitussin for cough  F/u blood cx Likely CAP  Admit to [facility]  Failed outpatient therapy with cecor and levaquin. Received azithro and rocephin in the ED. Will continue vanco and zosyn. Vanco to be dosed daily due to patient's REGINALDO on CKD. f/u vanc trough in am  CT chest no cont Small area of peripheral airspace disease at the right lung   base. Spiculated density left upper lobe, scar versus neoplasm.   Leukocytosis likely 2/2 pna  Duonebs prn  Robitussin for cough  F/u blood cx  F/u sputum cx, legionella antigen   Pulm Dr Messer consulted Sepsis 2/2 to CAP vs aspiration pneumonia  Admit to F  Failed outpatient therapy with cecor and levaquin. Received azithro and rocephin in the ED. Will continue vanco and zosyn. Vanco to be dosed daily due to patient's REGINALDO on CKD. f/u vanc trough in am  CT chest no cont Small area of peripheral airspace disease at the right lung   base. Spiculated density left upper lobe, scar versus neoplasm.   Leukocytosis likely 2/2 pna  Duonebs prn  Robitussin for cough  F/u blood cx  F/u sputum cx, legionella antigen   Pulm Dr Messer consulted Sepsis 2/2 to CAP vs aspiration pneumonia  Admit to F  Failed outpatient therapy with cecor and levaquin. Received azithro and rocephin in the ED. Will continue vanco and zosyn. Vanco to be dosed daily due to patient's REGINALDO on CKD. f/u vanc trough in am  CT chest no cont Small area of peripheral airspace disease at the right lung   base. Spiculated density left upper lobe, scar versus neoplasm.   Leukocytosis likely 2/2 pna  Lactic acidosis likely 2/2 infection. Now s/p NS bolus x 2. F/u repeat lactate at 17:15  Duonebs prn  Robitussin for cough  F/u blood cx  F/u sputum cx, legionella antigen   Pulm Dr Messer consulted

## 2018-07-14 NOTE — H&P ADULT - NSHPREVIEWOFSYSTEMS_GEN_ALL_CORE
CONSTITUTIONAL: denies fever, chills, fatigue, weakness  HEENT: denies blurred vision, sore throat  SKIN: denies new lesions, rash  CARDIOVASCULAR: denies chest pain, chest pressure, palpitations  RESPIRATORY: denies shortness of breath, sputum production  GASTROINTESTINAL: denies nausea, vomiting, diarrhea, abdominal pain  GENITOURINARY: denies dysuria, discharge  NEUROLOGICAL: denies numbness, headache, focal weakness  MUSCULOSKELETAL: denies new joint pain, muscle aches  HEMATOLOGIC: denies gross bleeding, bruising  LYMPHATICS: denies enlarged lymph nodes, extremity swelling  PSYCHIATRIC: denies recent changes in anxiety, depression  ENDOCRINOLOGIC: denies sweating, cold or heat intolerance Unable to obtain due to patient's dementia and mental status

## 2018-07-14 NOTE — CONSULT NOTE ADULT - SUBJECTIVE AND OBJECTIVE BOX
Date/Time Patient Seen:  		  Referring MD:   Data Reviewed	       Patient is a 89y old  Female who presents with a chief complaint of cough, congestion (14 Jul 2018 13:52)      Subjective/HPI    in bed  seen and examined  vs and meds reviewed  poor historian  labs and imaging reviewed  on broad spectrum ABX    ct chest reviewed    H&P Adult [Charted Location: \[facility] [place] [facility]\"" CHANDA Justin] [Authored: 14-Jul-2018 13:52]- for Visit: [id number], Complete, Revised, Signed in Full, General    History and Physical:   Source of Information	Chart(s), Patient, Child  Outpatient Providers	PCP: Dr Quesada     Language:  · Patient/Family of Limited English Proficiency	No       History of Present Illness:  Reason for Admission: cough, congestion  History of Present Illness:   89 yo F with PMHx of  dementia, HTN, GERD, chronic pain, anxiety w/ panic attacks presents with chest congestion. Patient's son, health care proxy Daron at bedside. Unable to obtain hx from patient due to mental status. Son states that patient has had persistent chest congestion and sputum production x few weeks despite 2-courses of antibiotics, Ceclor (for 5 days) and then Levaquin (for 7 days, last dose taken Thursday July 12, 2018). Son states that he was told by PCP that patient needs chest xray, however was unable to obtain before coming to ED. Son also reports patient has had poor appetite and increased confusion for the past two weeks. Per son patient has not had and fever, nausea, vomiting or diarrhea. No additional history at this time     PAST MEDICAL & SURGICAL HISTORY:  Anxiety  Spinal stenosis  HTN (hypertension)  Dementia  H/O left mastectomy  No significant past surgical history        Medication list         MEDICATIONS  (STANDING):  aspirin  chewable 81 milliGRAM(s) Oral daily  guaiFENesin    Syrup 100 milliGRAM(s) Oral every 6 hours  heparin  Injectable 5000 Unit(s) SubCutaneous every 8 hours  metoprolol tartrate 25 milliGRAM(s) Oral daily  NIFEdipine IR 10 milliGRAM(s) Oral two times a day  pantoprazole  Injectable 40 milliGRAM(s) IV Push two times a day  piperacillin/tazobactam IVPB. 3.375 Gram(s) IV Intermittent every 12 hours  tranylcypromine 10 milliGRAM(s) Oral <User Schedule>  vancomycin  IVPB 1000 milliGRAM(s) IV Intermittent once    MEDICATIONS  (PRN):  ALBUTerol/ipratropium for Nebulization 3 milliLiter(s) Nebulizer every 6 hours PRN Wheezing  ALPRAZolam 0.5 milliGRAM(s) Oral four times a day PRN anxiety  oxyCODONE    5 mG/acetaminophen 325 mG 0.5 Tablet(s) Oral every 8 hours PRN Moderate Pain (4 - 6)         Vitals log        ICU Vital Signs Last 24 Hrs  T(C): 35.5 (14 Jul 2018 18:28), Max: 36.3 (14 Jul 2018 10:37)  T(F): 95.9 (14 Jul 2018 18:28), Max: 97.4 (14 Jul 2018 10:37)  HR: 95 (14 Jul 2018 18:28) (95 - 108)  BP: 110/55 (14 Jul 2018 18:28) (87/64 - 110/55)  BP(mean): --  ABP: --  ABP(mean): --  RR: 18 (14 Jul 2018 18:28) (14 - 18)  SpO2: 100% (14 Jul 2018 18:28) (100% - 100%)           Input and Output:  I&O's Detail      Lab Data                        11.0   14.85 )-----------( 261      ( 14 Jul 2018 11:38 )             32.8     07-14    134<L>  |  100  |  50<H>  ----------------------------<  117<H>  4.5   |  20<L>  |  2.30<H>    Ca    8.4<L>      14 Jul 2018 11:38    TPro  5.7<L>  /  Alb  2.8<L>  /  TBili  0.6  /  DBili  x   /  AST  22  /  ALT  16  /  AlkPhos  84  07-14            Review of Systems	  verbal  confused  weak  frail      Objective     Physical Examination    heart s1s2  lung dec BS  abd soft      Pertinent Lab findings & Imaging      Cooley:  NO   Adequate UO     I&O's Detail           Discussed with:     Cultures:	        Radiology          EXAM:  CT CHEST                            PROCEDURE DATE:  07/14/2018          INTERPRETATION:  Clinical information: Cough    No prior chest CT studies present for comparison    Axial images obtained, coronal and sagittal images computer reformatted.   Noncontrast study limits evaluation of hilar and mediastinal regions.    The patient is rotated towards the right.    No pericardial effusion or thoracic aortic aneurysm. No mediastinal   lesions or hilar lesions identified, limited evaluation due to lack of IV   contrast.    Small area of peripheral airspace disease right lung base. No effusion.   No vascular congestion. No pneumothorax. Minimal bullous changes right   lower lobe.    Focal spiculated airspace disease in the left upper lobe measuring 12 mm,   could represent a scar but cannot exclude neoplasm.     Trace groundglass opacity both lung apices.    Central airway intact. Thyroid gland not enlarged.    No adrenal lesions. The spleen is not enlarged. No hydronephrotic   changes. No acute appearing osseous abnormalities.      IMPRESSION: Small area of peripheral airspace disease at the right lung   base    Spiculated density left upper lobe, scar versus neoplasm.    See additional findings as described above.                MARCO HOFFMAN M.D.,ATTENDING RADIOLOGIST  This document has been electronically signed. Jul 14 2018 12:48PM

## 2018-07-14 NOTE — H&P ADULT - PROBLEM SELECTOR PLAN 5
Continue home dose metoprolol and procardia with hold parameters Possible neoplasm noted in CT chest   Patient's son denies hx of neoplasm   Pulm Dr Messer consulted

## 2018-07-14 NOTE — CONSULT NOTE ADULT - ASSESSMENT
89yo F with PMHx of  dementia, HTN, GERD, chronic pain, anxiety w/ panic attacks presents with chest congestion likey from PNA    - Abx per primary team  - On limited exam does not appear markedly vol ol  - Now in AF. HR are labile and is relative to pt state of agitation. I assume it is also be faster given low BP  - Give IVF  - Hold BB and CCB  - At her age and comorbidities, risk of AC may outweigh benefits. Her son agrees. Cont with ASA.   - Sx not from an ischemic process.   - pain\agitation control  - Monitor and replete electrolytes. Keep K>4.0 and Mg>2.0.  - Further cardiac workup will depend on clinical course.   - All other workup per primary team. Will followup.

## 2018-07-14 NOTE — ED ADULT NURSE REASSESSMENT NOTE - NS ED NURSE REASSESS COMMENT FT1
pt not swallowing po medication, RN spoke to admitting Dr to change to IV. Also admitting do not drive while taking pain medications will be entering stat dose of Zosyn for pt

## 2018-07-14 NOTE — H&P ADULT - PROBLEM SELECTOR PLAN 7
continue home dose percocet and tramadol Continue home dose metoprolol and procardia with hold parameters Chronic, however currently hypotensive. Receiving 2nd liter bolus of NS   Continue home dose metoprolol and procardia with hold parameters

## 2018-07-14 NOTE — H&P ADULT - NSHPPHYSICALEXAM_GEN_ALL_CORE
T(C): 36.3 (07-14-18 @ 10:37), Max: 36.3 (07-14-18 @ 10:37)  HR: 108 (07-14-18 @ 12:30) (108 - 108)  BP: 97/66 (07-14-18 @ 12:30) (87/64 - 97/66)  RR: 14 (07-14-18 @ 10:37) (14 - 14)  SpO2: --    GENERAL: patient appears well, no acute distress, appropriate, pleasant  EYES: sclera clear, no exudates  ENMT: oropharynx clear without erythema, no exudates, moist mucous membranes  NECK: supple, soft, no thyromegaly noted  LUNGS: good air entry bilaterally, clear to auscultation, symmetric breath sounds, no wheezing or rhonchi appreciated  HEART: soft S1/S2, regular rate and rhythm, no murmurs noted, no lower extremity edema  GASTROINTESTINAL: abdomen is soft, nontender, nondistended, normoactive bowel sounds, no palpable masses  INTEGUMENT: good skin turgor, no lesions noted  MUSCULOSKELETAL: no clubbing or cyanosis, no obvious deformity  NEUROLOGIC: awake, alert, oriented x3, good muscle tone in 4 extremities, no obvious sensory deficits  PSYCHIATRIC: mood is good, affect is congruent, linear and logical thought process  HEME/LYMPH: no palpable supraclavicular nodules, no obvious ecchymosis or petechiae T(C): 36.3 (07-14-18 @ 10:37), Max: 36.3 (07-14-18 @ 10:37)  HR: 108 (07-14-18 @ 12:30) (108 - 108)  BP: 97/66 (07-14-18 @ 12:30) (87/64 - 97/66)  RR: 14 (07-14-18 @ 10:37) (14 - 14)  SpO2: --    Minimal physical exam due to patient being uncooperative   GENERAL: patient appears very confused and continuously states she "cannot hear," and "wants to go home"  NECK: supple, soft  LUNGS: coarse breath sounds b/l upper lung fields  HEART: soft S1/S2, regular rate and rhythm, no murmurs noted, no lower extremity edema  GASTROINTESTINAL: abdomen is soft, nontender, nondistended, normoactive bowel sounds, no palpable masses  INTEGUMENT: <2 second cap refill, abrasions present in b/l LE  MUSCULOSKELETAL: no clubbing or cyanosis, no obvious deformity  NEUROLOGIC: alert, however does not answer appropriately to questioning and does not follow commands

## 2018-07-14 NOTE — ED PROVIDER NOTE - CONSTITUTIONAL, MLM
normal... Weak appearing elderly white female, well nourished, awake, alert, oriented to person, in no apparent distress.

## 2018-07-15 LAB
ANION GAP SERPL CALC-SCNC: 13 MMOL/L — SIGNIFICANT CHANGE UP (ref 5–17)
BASOPHILS # BLD AUTO: 0 K/UL — SIGNIFICANT CHANGE UP (ref 0–0.2)
BASOPHILS NFR BLD AUTO: 0 % — SIGNIFICANT CHANGE UP (ref 0–2)
BUN SERPL-MCNC: 47 MG/DL — HIGH (ref 7–23)
CALCIUM SERPL-MCNC: 7.7 MG/DL — LOW (ref 8.5–10.1)
CHLORIDE SERPL-SCNC: 103 MMOL/L — SIGNIFICANT CHANGE UP (ref 96–108)
CO2 SERPL-SCNC: 18 MMOL/L — LOW (ref 22–31)
CREAT SERPL-MCNC: 2.1 MG/DL — HIGH (ref 0.5–1.3)
EOSINOPHIL # BLD AUTO: 0 K/UL — SIGNIFICANT CHANGE UP (ref 0–0.5)
EOSINOPHIL NFR BLD AUTO: 0 % — SIGNIFICANT CHANGE UP (ref 0–6)
GLUCOSE SERPL-MCNC: 96 MG/DL — SIGNIFICANT CHANGE UP (ref 70–99)
HCT VFR BLD CALC: 27.2 % — LOW (ref 34.5–45)
HGB BLD-MCNC: 9 G/DL — LOW (ref 11.5–15.5)
LACTATE SERPL-SCNC: 2.6 MMOL/L — HIGH (ref 0.7–2)
LACTATE SERPL-SCNC: 2.8 MMOL/L — HIGH (ref 0.7–2)
LACTATE SERPL-SCNC: 3.4 MMOL/L — HIGH (ref 0.7–2)
LYMPHOCYTES # BLD AUTO: 0.55 K/UL — LOW (ref 1–3.3)
LYMPHOCYTES # BLD AUTO: 5 % — LOW (ref 13–44)
MAGNESIUM SERPL-MCNC: 2.1 MG/DL — SIGNIFICANT CHANGE UP (ref 1.6–2.6)
MCHC RBC-ENTMCNC: 30.6 PG — SIGNIFICANT CHANGE UP (ref 27–34)
MCHC RBC-ENTMCNC: 33.1 GM/DL — SIGNIFICANT CHANGE UP (ref 32–36)
MCV RBC AUTO: 92.5 FL — SIGNIFICANT CHANGE UP (ref 80–100)
MONOCYTES # BLD AUTO: 1.22 K/UL — HIGH (ref 0–0.9)
MONOCYTES NFR BLD AUTO: 11 % — SIGNIFICANT CHANGE UP (ref 2–14)
NEUTROPHILS # BLD AUTO: 9.29 K/UL — HIGH (ref 1.8–7.4)
NEUTROPHILS NFR BLD AUTO: 84 % — HIGH (ref 43–77)
PHOSPHATE SERPL-MCNC: 2.8 MG/DL — SIGNIFICANT CHANGE UP (ref 2.5–4.5)
PLATELET # BLD AUTO: 219 K/UL — SIGNIFICANT CHANGE UP (ref 150–400)
POTASSIUM SERPL-MCNC: 4 MMOL/L — SIGNIFICANT CHANGE UP (ref 3.5–5.3)
POTASSIUM SERPL-SCNC: 4 MMOL/L — SIGNIFICANT CHANGE UP (ref 3.5–5.3)
RBC # BLD: 2.94 M/UL — LOW (ref 3.8–5.2)
RBC # FLD: 15.3 % — HIGH (ref 10.3–14.5)
SODIUM SERPL-SCNC: 134 MMOL/L — LOW (ref 135–145)
VANCOMYCIN TROUGH SERPL-MCNC: 12.2 UG/ML — SIGNIFICANT CHANGE UP (ref 10–20)
WBC # BLD: 11.06 K/UL — HIGH (ref 3.8–10.5)
WBC # FLD AUTO: 11.06 K/UL — HIGH (ref 3.8–10.5)

## 2018-07-15 PROCEDURE — 99233 SBSQ HOSP IP/OBS HIGH 50: CPT

## 2018-07-15 RX ORDER — SODIUM CHLORIDE 9 MG/ML
250 INJECTION INTRAMUSCULAR; INTRAVENOUS; SUBCUTANEOUS ONCE
Qty: 0 | Refills: 0 | Status: COMPLETED | OUTPATIENT
Start: 2018-07-15 | End: 2018-07-15

## 2018-07-15 RX ORDER — METOPROLOL TARTRATE 50 MG
12.5 TABLET ORAL EVERY 6 HOURS
Qty: 0 | Refills: 0 | Status: DISCONTINUED | OUTPATIENT
Start: 2018-07-15 | End: 2018-07-16

## 2018-07-15 RX ORDER — ALPRAZOLAM 0.25 MG
1 TABLET ORAL EVERY 12 HOURS
Qty: 0 | Refills: 0 | Status: DISCONTINUED | OUTPATIENT
Start: 2018-07-15 | End: 2018-07-16

## 2018-07-15 RX ORDER — SODIUM CHLORIDE 9 MG/ML
1000 INJECTION INTRAMUSCULAR; INTRAVENOUS; SUBCUTANEOUS
Qty: 0 | Refills: 0 | Status: DISCONTINUED | OUTPATIENT
Start: 2018-07-15 | End: 2018-07-16

## 2018-07-15 RX ADMIN — HEPARIN SODIUM 5000 UNIT(S): 5000 INJECTION INTRAVENOUS; SUBCUTANEOUS at 13:13

## 2018-07-15 RX ADMIN — SODIUM CHLORIDE 250 MILLILITER(S): 9 INJECTION INTRAMUSCULAR; INTRAVENOUS; SUBCUTANEOUS at 08:39

## 2018-07-15 RX ADMIN — PANTOPRAZOLE SODIUM 40 MILLIGRAM(S): 20 TABLET, DELAYED RELEASE ORAL at 05:44

## 2018-07-15 RX ADMIN — TRANYLCYPROMINE SULFATE 10 MILLIGRAM(S): 10 TABLET, FILM COATED ORAL at 13:13

## 2018-07-15 RX ADMIN — Medication 25 MILLIGRAM(S): at 05:44

## 2018-07-15 RX ADMIN — TRANYLCYPROMINE SULFATE 10 MILLIGRAM(S): 10 TABLET, FILM COATED ORAL at 05:44

## 2018-07-15 RX ADMIN — Medication 12.5 MILLIGRAM(S): at 23:40

## 2018-07-15 RX ADMIN — Medication 100 MILLIGRAM(S): at 17:16

## 2018-07-15 RX ADMIN — Medication 100 MILLIGRAM(S): at 23:40

## 2018-07-15 RX ADMIN — HEPARIN SODIUM 5000 UNIT(S): 5000 INJECTION INTRAVENOUS; SUBCUTANEOUS at 05:45

## 2018-07-15 RX ADMIN — Medication 81 MILLIGRAM(S): at 11:14

## 2018-07-15 RX ADMIN — Medication 100 MILLIGRAM(S): at 11:14

## 2018-07-15 RX ADMIN — PIPERACILLIN AND TAZOBACTAM 25 GRAM(S): 4; .5 INJECTION, POWDER, LYOPHILIZED, FOR SOLUTION INTRAVENOUS at 05:57

## 2018-07-15 RX ADMIN — Medication 100 MILLIGRAM(S): at 05:44

## 2018-07-15 RX ADMIN — Medication 10 MILLIGRAM(S): at 05:44

## 2018-07-15 RX ADMIN — Medication 250 MILLIGRAM(S): at 00:10

## 2018-07-15 RX ADMIN — SODIUM CHLORIDE 250 MILLILITER(S): 9 INJECTION INTRAMUSCULAR; INTRAVENOUS; SUBCUTANEOUS at 02:41

## 2018-07-15 RX ADMIN — SODIUM CHLORIDE 75 MILLILITER(S): 9 INJECTION INTRAMUSCULAR; INTRAVENOUS; SUBCUTANEOUS at 23:46

## 2018-07-15 RX ADMIN — HEPARIN SODIUM 5000 UNIT(S): 5000 INJECTION INTRAVENOUS; SUBCUTANEOUS at 20:52

## 2018-07-15 RX ADMIN — TRANYLCYPROMINE SULFATE 10 MILLIGRAM(S): 10 TABLET, FILM COATED ORAL at 20:51

## 2018-07-15 RX ADMIN — Medication 0.5 MILLIGRAM(S): at 10:07

## 2018-07-15 RX ADMIN — PANTOPRAZOLE SODIUM 40 MILLIGRAM(S): 20 TABLET, DELAYED RELEASE ORAL at 17:16

## 2018-07-15 RX ADMIN — PIPERACILLIN AND TAZOBACTAM 25 GRAM(S): 4; .5 INJECTION, POWDER, LYOPHILIZED, FOR SOLUTION INTRAVENOUS at 17:16

## 2018-07-15 NOTE — PROGRESS NOTE ADULT - PROBLEM SELECTOR PLAN 10
IMPROVE VTE Individual Risk Assessment          RISK                                                          Points  [  ] Previous VTE                                                3  [  ] Thrombophilia                                             2  [  ] Lower limb paralysis                                   2        (unable to hold up >15 seconds)    [  ] Current Cancer                                            2         (within 6 months)  [  ] Immobilization > 24 hrs                              1  [  ] ICU/CCU stay > 24 hours                            1  [ x ] Age > 60                                                    1  IMPROVE VTE Score ___1______  DVT ppx: heparin subq

## 2018-07-15 NOTE — PROGRESS NOTE ADULT - ATTENDING COMMENTS
Prognosis if guarded.  Wound care consult  Palliative care for advance directives. Prognosis if guarded.  Wound care consult  Palliative care for advance directives Prognosis if guarded.  Wound care consult  Palliative care for advance directives  Cardio f/u

## 2018-07-15 NOTE — PROGRESS NOTE ADULT - PROBLEM SELECTOR PLAN 1
Sepsis 2/2 to CAP vs aspiration pneumonia  Failed outpatient therapy with cecor and levaquin. Received azithro and rocephin in the ED. Will continue vanco and zosyn. Vanco to be dosed daily due to patient's REGINALDO on CKD. f/u vanc trough in am  CT chest no cont Small area of peripheral airspace disease at the right lung   base. Spiculated density left upper lobe, scar versus neoplasm.   Leukocytosis likely 2/2 pna  Lactic acidosis likely 2/2 infection. Now s/p NS bolus x 2. F/u repeat lactate at 17:15  Duonebs prn  Robitussin for cough  F/u blood cx  F/u sputum cx, legionella antigen   Pulm Dr Messer

## 2018-07-15 NOTE — PROGRESS NOTE ADULT - ASSESSMENT
89 yo F with PMHx of advanced dementia, HTN, GERD, chronic pain, anxiety w/ panic attacks presents with persistent chest congestion and sputum production x few weeks despite 2-courses of antibiotics, Ceclor and then Levaquin. Admit to [facility] with CAP, new onset a fib, elevated lactate, REGINALDO on CKD.

## 2018-07-15 NOTE — CHART NOTE - NSCHARTNOTEFT_GEN_A_CORE
pt in bed  spoke with son  he is thinking about GOC  discussed prognosis and current status  educated about MOLST and DNR   will follow

## 2018-07-15 NOTE — CONSULT NOTE ADULT - ASSESSMENT
89f with ckd 3, dementia, htn  admitted with chest congestion  found to have tachycardia, leukocytosis and laura on ckd  sepsis v sirs  hospital course complicated by new onset Atrial fib

## 2018-07-15 NOTE — CHART NOTE - NSCHARTNOTEFT_GEN_A_CORE
Called by RN for Pt with lactate 2.8. Pt lactate has trended down from 4.7 --> 3.9 --> 2.9. Pt has received 250cc NS bolus x 2. On exam, pt has course breath sounds throughout lung fields. Heart with irregular tachycardic rate. Due to concern for fluid overload, will not rebolus at this time. Lactate scheduled for 5AM. Will reassess patient and consider bolus based on lactate and physical exam. VSS. Patient on warmer blanket due to hypothermia. RN to call for change in status. Spoke with Dr. Jerome, who agrees with plan.

## 2018-07-15 NOTE — CHART NOTE - NSCHARTNOTEFT_GEN_A_CORE
RN called due to increased heart rate noted on tele monitor of 110-120s with BP of 102/70.  Patient normally has HR of low 90s.  Pt did not receive metoprolol at 12:00 and 18:00.  Patient was seen and evaluated at bedside.  Heart rate palpated at bedside was 104 bpm, BP repeat was 125/82.  Patient denied dizziness, chest pain, SOB.  Patient fluid was increased to RN called due to increased heart rate noted on tele monitor of 110-120s with BP of 102/70.  Patient normally has HR of low 90s.  Pt did not receive metoprolol at 12:00 and 18:00.  Patient was seen and evaluated at bedside.  Heart rate palpated at bedside was 104 bpm, BP repeat was 125/82.  Patient denied dizziness, chest pain, SOB.  Patient fluid was increased to 75cc as per Cardio recommendation.      Vital Signs Last 24 Hrs  T(C): 36.3 (15 Jul 2018 20:35), Max: 36.3 (15 Jul 2018 20:35)  T(F): 97.3 (15 Jul 2018 20:35), Max: 97.3 (15 Jul 2018 20:35)  HR: 105 (15 Jul 2018 22:18) (98 - 135)  BP: 125/82 (15 Jul 2018 22:11) (95/63 - 125/82)  RR: 17 (15 Jul 2018 20:35) (17 - 19)  SpO2: 95% (15 Jul 2018 20:35) (95% - 99%)    Physical Exam:  General:  NAD  HEENT: moist mucous membranes   Respiratory: CTA B/L, No W/R/R  CV: irregularly irregular rate and rhythm  Abdominal: Soft, NT, ND  Extremities: + peripheral pulses  Skin: warm, dry, ecchymosis 2/2 blood draws on right antecubital fossa    Assessment and Plan:  89 yr old F PMHx of  dementia, HTN, GERD, chronic pain, anxiety admitted for chest congestion 2/2 pneumonia  1. Tachycardia  -patient missed 2 doses of metoprolol, give midnight metoprolol dose  -increased maintenance fluids to 75cc  -monitor vitals and c/w BP meds with holding parameters

## 2018-07-15 NOTE — CONSULT NOTE ADULT - SUBJECTIVE AND OBJECTIVE BOX
Patient is a 89y old  Female who presents with a chief complaint of cough, congestion (2018 13:52)      89 year old Female with history of anxiety, spinal stenosis, HTN, dementia, left mastectomy, admitted for pneumonia, consult called for episode of NSVT, also hypothermic and lactate elevated. Interview limited by dementia, she denies SOB    PAST MEDICAL & SURGICAL HISTORY:  Anxiety  Spinal stenosis  HTN (hypertension)  Dementia  H/O left mastectomy            Medications:  piperacillin/tazobactam IVPB. 3.375 Gram(s) IV Intermittent every 12 hours    metoprolol tartrate 25 milliGRAM(s) Oral daily  NIFEdipine IR 10 milliGRAM(s) Oral two times a day    ALBUTerol/ipratropium for Nebulization 3 milliLiter(s) Nebulizer every 6 hours PRN  guaiFENesin    Syrup 100 milliGRAM(s) Oral every 6 hours    ALPRAZolam 0.5 milliGRAM(s) Oral four times a day PRN  oxyCODONE    5 mG/acetaminophen 325 mG 0.5 Tablet(s) Oral every 8 hours PRN  tranylcypromine 10 milliGRAM(s) Oral <User Schedule>      aspirin  chewable 81 milliGRAM(s) Oral daily  heparin  Injectable 5000 Unit(s) SubCutaneous every 8 hours    pantoprazole  Injectable 40 milliGRAM(s) IV Push two times a day                      ICU Vital Signs Last 24 Hrs  T(C): 34 (2018 23:13), Max: 36.3 (2018 10:37)  T(F): 93.2 (2018 23:13), Max: 97.4 (2018 10:37)  HR: 98 (2018 23:13) (78 - 135)  BP: 107/57 (2018 23:13) (87/64 - 114/73)  BP(mean): --  ABP: --  ABP(mean): --  RR: 18 (2018 18:28) (14 - 18)  SpO2: 96% (2018 22:08) (96% - 100%)          I&O's Detail        LABS:                        11.0   14.85 )-----------( 261      ( 2018 11:38 )             32.8     07-14    134<L>  |  100  |  50<H>  ----------------------------<  117<H>  4.5   |  20<L>  |  2.30<H>    Ca    8.4<L>      2018 11:38  Phos  2.8     07-  Mg     1.5     -14    TPro  5.7<L>  /  Alb  2.8<L>  /  TBili  0.6  /  DBili  x   /  AST  22  /  ALT  16  /  AlkPhos  84  07-14          CAPILLARY BLOOD GLUCOSE        PT/INR - ( 2018 18:10 )   PT: 21.4 sec;   INR: 1.94 ratio         PTT - ( 2018 18:10 )  PTT:42.3 sec  Urinalysis Basic - ( 2018 13:08 )    Color: Yellow / Appearance: Clear / S.010 / pH: x  Gluc: x / Ketone: Negative  / Bili: Negative / Urobili: Negative   Blood: x / Protein: Negative / Nitrite: Negative   Leuk Esterase: Trace / RBC: 0-2 /HPF / WBC 3-5   Sq Epi: x / Non Sq Epi: Occasional / Bacteria: x      CULTURES:      Physical Examination:    General: awake and alert, interactive, demented.    HEENT: Pupils equal, reactive to light.  Symmetric.    PULM: rales at bases.     CVS: s1s2 irregulary irreg, rate 120s    ABD: Soft, nondistended, nontender    EXT: Mild lower extremity edema    Neuro: AAOX 1, follow commands well, no focal deficits    POCUS: A line predominant with scant B lines bilaterally    Dx:   severe sepsis  Pneumonia  elevated lactate  hypothermia  CHF     89 yo F admitted with sepsis from pneumonia, hypothermic, lactate 3.9, now with episode of NSVT. her blood pressure is stable, she is breathing comfortably. Pro BNP elevated but CT scan from earlier today with small pneumonia otherwise clear lungs, only scant B lines on bedside pulm ultrasound, likely can tolerate IVF bolus  - hypothermia from sepsis, continue antibiotics, IVF bolus 250 x2 over 2 hours and monitor closely for CHF, consider warming blanket. check Thyroid pane  - Trend lactate  - supp o2 for sp02 >92%  - for NSVT, check chemistry, optimize lytes, give dose of 2.5mg IV metoprolol.   - Check EKG  - Wont benefit from ICU admission at this time, will follow closely. please call with questions    Discussed with Dr Hernadez     CC time 40 min including time spent reviewing chart, evaluating patient at bedside, discussing care with multidisciplinary team, not including time spent performing procedures. Patient is a 89y old  Female who presents with a chief complaint of cough, congestion (2018 13:52)      89 year old Female with history of anxiety, spinal stenosis, HTN, dementia, left mastectomy, admitted for pneumonia, consult called for episode of NSVT, also hypothermic and lactate elevated. Interview limited by dementia, she denies SOB    PAST MEDICAL & SURGICAL HISTORY:  Anxiety  Spinal stenosis  HTN (hypertension)  Dementia  H/O left mastectomy            Medications:  piperacillin/tazobactam IVPB. 3.375 Gram(s) IV Intermittent every 12 hours    metoprolol tartrate 25 milliGRAM(s) Oral daily  NIFEdipine IR 10 milliGRAM(s) Oral two times a day    ALBUTerol/ipratropium for Nebulization 3 milliLiter(s) Nebulizer every 6 hours PRN  guaiFENesin    Syrup 100 milliGRAM(s) Oral every 6 hours    ALPRAZolam 0.5 milliGRAM(s) Oral four times a day PRN  oxyCODONE    5 mG/acetaminophen 325 mG 0.5 Tablet(s) Oral every 8 hours PRN  tranylcypromine 10 milliGRAM(s) Oral <User Schedule>      aspirin  chewable 81 milliGRAM(s) Oral daily  heparin  Injectable 5000 Unit(s) SubCutaneous every 8 hours    pantoprazole  Injectable 40 milliGRAM(s) IV Push two times a day                      ICU Vital Signs Last 24 Hrs  T(C): 34 (2018 23:13), Max: 36.3 (2018 10:37)  T(F): 93.2 (2018 23:13), Max: 97.4 (2018 10:37)  HR: 98 (2018 23:13) (78 - 135)  BP: 107/57 (2018 23:13) (87/64 - 114/73)  BP(mean): --  ABP: --  ABP(mean): --  RR: 18 (2018 18:28) (14 - 18)  SpO2: 96% (2018 22:08) (96% - 100%)          I&O's Detail        LABS:                        11.0   14.85 )-----------( 261      ( 2018 11:38 )             32.8     07-14    134<L>  |  100  |  50<H>  ----------------------------<  117<H>  4.5   |  20<L>  |  2.30<H>    Ca    8.4<L>      2018 11:38  Phos  2.8     07-  Mg     1.5     -14    TPro  5.7<L>  /  Alb  2.8<L>  /  TBili  0.6  /  DBili  x   /  AST  22  /  ALT  16  /  AlkPhos  84  07-14          CAPILLARY BLOOD GLUCOSE        PT/INR - ( 2018 18:10 )   PT: 21.4 sec;   INR: 1.94 ratio         PTT - ( 2018 18:10 )  PTT:42.3 sec  Urinalysis Basic - ( 2018 13:08 )    Color: Yellow / Appearance: Clear / S.010 / pH: x  Gluc: x / Ketone: Negative  / Bili: Negative / Urobili: Negative   Blood: x / Protein: Negative / Nitrite: Negative   Leuk Esterase: Trace / RBC: 0-2 /HPF / WBC 3-5   Sq Epi: x / Non Sq Epi: Occasional / Bacteria: x      CULTURES:      Physical Examination:    General: awake and alert, interactive, demented.    HEENT: Pupils equal, reactive to light.  Symmetric.    PULM: rales at bases.     CVS: s1s2 irregulary irreg, rate 120s    ABD: Soft, nondistended, nontender    EXT: Mild lower extremity edema    Neuro: AAOX 1, follow commands well, no focal deficits    POCUS: A line predominant with scant B lines bilaterally    Dx:   severe sepsis  Pneumonia  elevated lactate  hypothermia  Rapid atrial fibrillation       89 yo F admitted with sepsis from pneumonia, hypothermic, lactate 3.9, now with episode of NSVT. her blood pressure is stable, she is breathing comfortably. Pro BNP elevated but CT scan from earlier today with small pneumonia otherwise clear lungs, only scant B lines on bedside pulm ultrasound, likely can tolerate IVF bolus  - hypothermia from sepsis, continue antibiotics, IVF bolus 250 x2 over 2 hours and monitor closely for CHF, consider warming blanket. check Thyroid pane  - Trend lactate  - supp o2 for sp02 >92%  - for NSVT, check chemistry, optimize lytes, give dose of 2.5mg IV metoprolol.   - new onset rapid atrial fibrillation, cardiology following, AC not safe due to age, metoprolol IV  - Check EKG  - Wont benefit from ICU admission at this time, will follow closely. please call with questions    Discussed with Dr Hernadez EICU    CC time 40 min including time spent reviewing chart, evaluating patient at bedside, discussing care with multidisciplinary team, not including time spent performing procedures.

## 2018-07-15 NOTE — CONSULT NOTE ADULT - SUBJECTIVE AND OBJECTIVE BOX
Patient is a 89y old  Female who presents with a chief complaint of cough, congestion (2018 13:52)    HPI:  89 yo F with PMHx of  dementia, HTN, GERD, chronic pain, anxiety w/ panic attacks presents with chest congestion. Patient's son, health care proxy Daron at bedside. Unable to obtain hx from patient due to mental status. Son states that patient has had persistent chest congestion and sputum production x few weeks despite 2-courses of antibiotics, Ceclor (for 5 days) and then Levaquin (for 7 days, last dose taken ). Son states that he was told by PCP that patient needs chest xray, however was unable to obtain before coming to ED. Son also reports patient has had poor appetite and increased confusion for the past two weeks. Per son patient has not had and fever, nausea, vomiting or diarrhea. No additional history at this time    In the ED, VS temp 97.4, , BP 97/66, , RR 14, SpO2 100. WBC 14.85, Hg 11.0, Hct 32.8, platelet 261, neutrophil % 80.8, Na 134, K 4.5, Cl 100, Co2 20, BUN 50, Cr 2.30, lactate, procalcitonin 0.15, UA trace leukocyte esterase.    Imaging:   CT chest no cont Small area of peripheral airspace disease at the right lung base. Spiculated density left upper lobe, scar versus neoplasm  CXR: grossly clear lungs.  EKG:  afib with rvr at 157 bpm    In the ED, received duoneb x 1, NS 1L bolus x 1, another NS 1L bolus ordered, azithromycin 500 mg x 1, ceftriaxone 1 g x 1 (2018 13:52)    Renal consult called for REGINALDO. Pt with baseline CKD 3.       PAST MEDICAL HISTORY:  Anxiety  Spinal stenosis  HTN (hypertension)  Dementia      PAST SURGICAL HISTORY:  H/O left mastectomy  No significant past surgical history      FAMILY HISTORY:  No pertinent family history in first degree relatives      SOCIAL HISTORY: No smoking or alcohol use     Allergies    No Known Allergies    Intolerances      Home Medications:  ALPRAZolam 0.5 mg oral tablet: 1 tab(s) orally 4 times a day, As Needed (2018 17:25)  metoprolol tartrate 25 mg oral tablet: 1 tab(s) orally once a day (2018 17:25)  pantoprazole 40 mg oral granule, enteric coated: 1 each orally 2 times a day (2018 17:25)  Parnate 10 mg oral tablet: 1 tab(s) orally 3 times a day (2018 17:25)  Procardia 10 mg oral capsule: orally 2 times a day (2018 17:25)  Vicodin 5 mg-300 mg oral tablet: 1 tab(s) orally every 4 hours (2018 17:25)    MEDICATIONS  (STANDING):  aspirin  chewable 81 milliGRAM(s) Oral daily  guaiFENesin    Syrup 100 milliGRAM(s) Oral every 6 hours  heparin  Injectable 5000 Unit(s) SubCutaneous every 8 hours  metoprolol tartrate 12.5 milliGRAM(s) Oral every 6 hours  pantoprazole  Injectable 40 milliGRAM(s) IV Push two times a day  piperacillin/tazobactam IVPB. 3.375 Gram(s) IV Intermittent every 12 hours  tranylcypromine 10 milliGRAM(s) Oral <User Schedule>    MEDICATIONS  (PRN):  ALBUTerol/ipratropium for Nebulization 3 milliLiter(s) Nebulizer every 6 hours PRN Wheezing  ALPRAZolam 1 milliGRAM(s) Oral every 12 hours PRN agitation  ALPRAZolam 0.5 milliGRAM(s) Oral four times a day PRN anxiety  oxyCODONE    5 mG/acetaminophen 325 mG 0.5 Tablet(s) Oral every 8 hours PRN Moderate Pain (4 - 6)      REVIEW OF SYSTEMS:  Unable to obtain    T(F): 97.1 (07-15-18 @ 02:40), Max: 97.1 (07-15-18 @ 02:40)  HR: 112 (07-15-18 @ 02:40) (78 - 135)  BP: 105/60 (07-15-18 @ 04:19) (97/66 - 114/73)  RR: 19 (07-15-18 @ 04:19) (18 - 19)  SpO2: 95% (07-15-18 @ 04:19) (95% - 100%)  Wt(kg): --    PHYSICAL EXAM:  General: NAD  Respiratory: b/l air entry  Cardiovascular: S1 S2  Gastrointestinal: soft  Extremities: no edema        07-15    134<L>  |  103  |  47<H>  ----------------------------<  96  4.0   |  18<L>  |  2.10<H>    Ca    7.7<L>      15 Jul 2018 07:18  Phos  2.8     07-15  Mg     2.1     07-15    TPro  5.7<L>  /  Alb  2.8<L>  /  TBili  0.6  /  DBili  x   /  AST  22  /  ALT  16  /  AlkPhos  84                            9.0    11.06 )-----------( 219      ( 15 Jul 2018 07:18 )             27.2       Hemoglobin: 9.0 g/dL (07-15 @ 07:18)  Hematocrit: 27.2 % (07-15 @ 07:18)  Potassium, Serum: 4.0 mmol/L (07-15 @ 07:18)  Blood Urea Nitrogen, Serum: 47 mg/dL (07-15 @ 07:18)      Creatinine, Serum: 2.10 (07-15 @ 07:18)  Creatinine, Serum: 2.30 ( @ 11:38)    Creatinine, Serum: 1.60 mg/dL (18 @ 06:48)          Urinalysis Basic - ( 2018 13:08 )    Color: Yellow / Appearance: Clear / S.010 / pH: x  Gluc: x / Ketone: Negative  / Bili: Negative / Urobili: Negative   Blood: x / Protein: Negative / Nitrite: Negative   Leuk Esterase: Trace / RBC: 0-2 /HPF / WBC 3-5   Sq Epi: x / Non Sq Epi: Occasional / Bacteria: x      LIVER FUNCTIONS - ( 2018 11:38 )  Alb: 2.8 g/dL / Pro: 5.7 g/dL / ALK PHOS: 84 U/L / ALT: 16 U/L / AST: 22 U/L / GGT: x                       I&O's Detail

## 2018-07-15 NOTE — PROGRESS NOTE ADULT - PROBLEM SELECTOR PLAN 1
frailty  pna  dementia  elevated LA  pulm nodule 1.2 cm  cont emp ABX  s/p multiple boluses of IVF for elev LA, caution with IVF, proBNP elev, likely volume overload  NEBS prn, monitor for sob and or wheezing  am labs pending  supportive care and assist with ADL  prognosis poor overall, advanced age and multiple medical issues and functional decline  as per son, pt wishes to be DNR  will follow

## 2018-07-15 NOTE — PROGRESS NOTE ADULT - ATTENDING COMMENTS
[FreeTextEntry1] : Bismatrol tablets #2 chewable given\par flat ginger ale\par bland diet today\par rtc new or worsening s/s I saw and examined the patient personally. Spoke with above provider regarding this case. I reviewed the above findings completely.  I agree with the above history, physical, and plan which I have edited where appropriate.

## 2018-07-15 NOTE — PROGRESS NOTE ADULT - SUBJECTIVE AND OBJECTIVE BOX
Patient is a 89y old  Female who presents with a chief complaint of cough, congestion (14 Jul 2018 13:52)       INTERVAL HPI/OVERNIGHT EVENTS: Patient seen and examined at bedside. On bare hugger for hypothermia. Confused at baseline     MEDICATIONS  (STANDING):  aspirin  chewable 81 milliGRAM(s) Oral daily  guaiFENesin    Syrup 100 milliGRAM(s) Oral every 6 hours  heparin  Injectable 5000 Unit(s) SubCutaneous every 8 hours  metoprolol tartrate 25 milliGRAM(s) Oral daily  NIFEdipine IR 10 milliGRAM(s) Oral two times a day  pantoprazole  Injectable 40 milliGRAM(s) IV Push two times a day  piperacillin/tazobactam IVPB. 3.375 Gram(s) IV Intermittent every 12 hours  tranylcypromine 10 milliGRAM(s) Oral <User Schedule>    MEDICATIONS  (PRN):  ALBUTerol/ipratropium for Nebulization 3 milliLiter(s) Nebulizer every 6 hours PRN Wheezing  ALPRAZolam 0.5 milliGRAM(s) Oral four times a day PRN anxiety  oxyCODONE    5 mG/acetaminophen 325 mG 0.5 Tablet(s) Oral every 8 hours PRN Moderate Pain (4 - 6)      Allergies    No Known Allergies    Intolerances        REVIEW OF SYSTEMS:  Unable to obtain, patient, us confused at baseline secondary to dementia   Vital Signs Last 24 Hrs  T(C): 36.2 (15 Jul 2018 02:40), Max: 36.3 (14 Jul 2018 10:37)  T(F): 97.1 (15 Jul 2018 02:40), Max: 97.4 (14 Jul 2018 10:37)  HR: 112 (15 Jul 2018 02:40) (78 - 135)  BP: 105/60 (15 Jul 2018 04:19) (87/64 - 114/73)  BP(mean): --  RR: 19 (15 Jul 2018 04:19) (14 - 19)  SpO2: 95% (15 Jul 2018 04:19) (95% - 100%)    PHYSICAL EXAM:  GENERAL: NAD, Frail, awake  HEAD:  Atraumatic, Normocephalic  EYES: EOMI, PERRLA, conjunctiva and sclera clear  ENMT: No tonsillar erythema, exudates, or enlargement; Moist mucous membranes   NECK: Supple, No JVD, Normal thyroid  NERVOUS SYSTEM:  Alert & Awake, frail, AAO X 1  CHEST/LUNG: Decrease  to auscultation bilaterally; No rales, rhonchi, wheezing, or rubs  HEART: S1S2+, IRRegular rate and rhythm  ABDOMEN: Soft, Nontender, Nondistended; Bowel sounds present  EXTREMITIES:  2+ Peripheral Pulses, No clubbing, cyanosis, or edema, + skin tear right shin   LYMPH: No lymphadenopathy noted  SKIN: + brusing? pelvic area     LABS:                        9.0    11.06 )-----------( 219      ( 15 Jul 2018 07:18 )             27.2     15 Jul 2018 07:18    134    |  103    |  47     ----------------------------<  96     4.0     |  18     |  2.10     Ca    7.7        15 Jul 2018 07:18  Phos  2.8       15 Jul 2018 07:18  Mg     2.1       15 Jul 2018 07:18    TPro  5.7    /  Alb  2.8    /  TBili  0.6    /  DBili  x      /  AST  22     /  ALT  16     /  AlkPhos  84     14 Jul 2018 11:38    PT/INR - ( 14 Jul 2018 18:10 )   PT: 21.4 sec;   INR: 1.94 ratio         PTT - ( 14 Jul 2018 18:10 )  PTT:42.3 sec  CAPILLARY BLOOD GLUCOSE        BLOOD CULTURE    RADIOLOGY & ADDITIONAL TESTS:    Imaging Personally Reviewed:  [ ] YES     Consultant(s) Notes Reviewed:      Care Discussed with Consultants/Other Providers: Patient is a 89y old  Female who presents with a chief complaint of cough, congestion (14 Jul 2018 13:52)       INTERVAL HPI/OVERNIGHT EVENTS: Patient seen and examined at bedside. On bare hugger for hypothermia. Confused at baseline, but now also agitated at times in [facility]     MEDICATIONS  (STANDING):  aspirin  chewable 81 milliGRAM(s) Oral daily  guaiFENesin    Syrup 100 milliGRAM(s) Oral every 6 hours  heparin  Injectable 5000 Unit(s) SubCutaneous every 8 hours  metoprolol tartrate 25 milliGRAM(s) Oral daily  NIFEdipine IR 10 milliGRAM(s) Oral two times a day  pantoprazole  Injectable 40 milliGRAM(s) IV Push two times a day  piperacillin/tazobactam IVPB. 3.375 Gram(s) IV Intermittent every 12 hours  tranylcypromine 10 milliGRAM(s) Oral <User Schedule>    MEDICATIONS  (PRN):  ALBUTerol/ipratropium for Nebulization 3 milliLiter(s) Nebulizer every 6 hours PRN Wheezing  ALPRAZolam 0.5 milliGRAM(s) Oral four times a day PRN anxiety  oxyCODONE    5 mG/acetaminophen 325 mG 0.5 Tablet(s) Oral every 8 hours PRN Moderate Pain (4 - 6)      Allergies    No Known Allergies    Intolerances        REVIEW OF SYSTEMS:  Unable to obtain, patient, us confused at baseline secondary to dementia   Vital Signs Last 24 Hrs  T(C): 36.2 (15 Jul 2018 02:40), Max: 36.3 (14 Jul 2018 10:37)  T(F): 97.1 (15 Jul 2018 02:40), Max: 97.4 (14 Jul 2018 10:37)  HR: 112 (15 Jul 2018 02:40) (78 - 135)  BP: 105/60 (15 Jul 2018 04:19) (87/64 - 114/73)  BP(mean): --  RR: 19 (15 Jul 2018 04:19) (14 - 19)  SpO2: 95% (15 Jul 2018 04:19) (95% - 100%)    PHYSICAL EXAM:  GENERAL: NAD, Frail, awake  HEAD:  Atraumatic, Normocephalic  EYES: EOMI, PERRLA, conjunctiva and sclera clear  ENMT: No tonsillar erythema, exudates, or enlargement; Moist mucous membranes   NECK: Supple, No JVD, Normal thyroid  NERVOUS SYSTEM:  Alert & Awake, frail, AAO X 1  CHEST/LUNG: Decrease  to auscultation bilaterally; No rales, rhonchi, wheezing, or rubs  HEART: S1S2+, IRRegular rate and rhythm  ABDOMEN: Soft, Nontender, Nondistended; Bowel sounds present  EXTREMITIES:  2+ Peripheral Pulses, No clubbing, cyanosis, or edema, + skin tear right shin   LYMPH: No lymphadenopathy noted  SKIN: + brusing? pelvic area     LABS:                        9.0    11.06 )-----------( 219      ( 15 Jul 2018 07:18 )             27.2     15 Jul 2018 07:18    134    |  103    |  47     ----------------------------<  96     4.0     |  18     |  2.10     Ca    7.7        15 Jul 2018 07:18  Phos  2.8       15 Jul 2018 07:18  Mg     2.1       15 Jul 2018 07:18    TPro  5.7    /  Alb  2.8    /  TBili  0.6    /  DBili  x      /  AST  22     /  ALT  16     /  AlkPhos  84     14 Jul 2018 11:38    PT/INR - ( 14 Jul 2018 18:10 )   PT: 21.4 sec;   INR: 1.94 ratio         PTT - ( 14 Jul 2018 18:10 )  PTT:42.3 sec  CAPILLARY BLOOD GLUCOSE        BLOOD CULTURE    RADIOLOGY & ADDITIONAL TESTS:    Imaging Personally Reviewed:  [ ] YES     Consultant(s) Notes Reviewed:      Care Discussed with Consultants/Other Providers: Patient is a 89y old  Female who presents with a chief complaint of cough, congestion (14 Jul 2018 13:52)       INTERVAL HPI/OVERNIGHT EVENTS: Patient seen and examined at bedside. On bare hugger for hypothermia. Confused at baseline, but now also agitated at times in the hospital     MEDICATIONS  (STANDING):  aspirin  chewable 81 milliGRAM(s) Oral daily  guaiFENesin    Syrup 100 milliGRAM(s) Oral every 6 hours  heparin  Injectable 5000 Unit(s) SubCutaneous every 8 hours  metoprolol tartrate 25 milliGRAM(s) Oral daily  NIFEdipine IR 10 milliGRAM(s) Oral two times a day  pantoprazole  Injectable 40 milliGRAM(s) IV Push two times a day  piperacillin/tazobactam IVPB. 3.375 Gram(s) IV Intermittent every 12 hours  tranylcypromine 10 milliGRAM(s) Oral <User Schedule>    MEDICATIONS  (PRN):  ALBUTerol/ipratropium for Nebulization 3 milliLiter(s) Nebulizer every 6 hours PRN Wheezing  ALPRAZolam 0.5 milliGRAM(s) Oral four times a day PRN anxiety  oxyCODONE    5 mG/acetaminophen 325 mG 0.5 Tablet(s) Oral every 8 hours PRN Moderate Pain (4 - 6)      Allergies    No Known Allergies    Intolerances        REVIEW OF SYSTEMS:  Unable to obtain, patient, us confused at baseline secondary to dementia   Vital Signs Last 24 Hrs  T(C): 36.2 (15 Jul 2018 02:40), Max: 36.3 (14 Jul 2018 10:37)  T(F): 97.1 (15 Jul 2018 02:40), Max: 97.4 (14 Jul 2018 10:37)  HR: 112 (15 Jul 2018 02:40) (78 - 135)  BP: 105/60 (15 Jul 2018 04:19) (87/64 - 114/73)  BP(mean): --  RR: 19 (15 Jul 2018 04:19) (14 - 19)  SpO2: 95% (15 Jul 2018 04:19) (95% - 100%)    PHYSICAL EXAM:  GENERAL: NAD, Frail, awake  HEAD:  Atraumatic, Normocephalic  EYES: EOMI, PERRLA, conjunctiva and sclera clear  ENMT: No tonsillar erythema, exudates, or enlargement; Moist mucous membranes   NECK: Supple, No JVD, Normal thyroid  NERVOUS SYSTEM:  Alert & Awake, frail, AAO X 1  CHEST/LUNG: Decrease  to auscultation bilaterally; No rales, rhonchi, wheezing, or rubs  HEART: S1S2+, IRRegular rate and rhythm  ABDOMEN: Soft, Nontender, Nondistended; Bowel sounds present  EXTREMITIES:  2+ Peripheral Pulses, No clubbing, cyanosis, or edema, + skin tear right shin   LYMPH: No lymphadenopathy noted  SKIN: + brusing? pelvic area     LABS:                        9.0    11.06 )-----------( 219      ( 15 Jul 2018 07:18 )             27.2     15 Jul 2018 07:18    134    |  103    |  47     ----------------------------<  96     4.0     |  18     |  2.10     Ca    7.7        15 Jul 2018 07:18  Phos  2.8       15 Jul 2018 07:18  Mg     2.1       15 Jul 2018 07:18    TPro  5.7    /  Alb  2.8    /  TBili  0.6    /  DBili  x      /  AST  22     /  ALT  16     /  AlkPhos  84     14 Jul 2018 11:38    PT/INR - ( 14 Jul 2018 18:10 )   PT: 21.4 sec;   INR: 1.94 ratio         PTT - ( 14 Jul 2018 18:10 )  PTT:42.3 sec  CAPILLARY BLOOD GLUCOSE        BLOOD CULTURE    RADIOLOGY & ADDITIONAL TESTS:    Imaging Personally Reviewed:  [ ] YES     Consultant(s) Notes Reviewed:      Care Discussed with Consultants/Other Providers:

## 2018-07-15 NOTE — CONSULT NOTE ADULT - PROBLEM SELECTOR RECOMMENDATION 9
eval sepsis  eval PNA  ct chest reviewed  on emp broad spectrum ABX - failed outpatient ABX therapy  NEBS prn to help mobilize secretions  aspiration risk very high, speech and swallow eval  robitussin PRN for cough  keep HOB elev  oral hygiene  keep sat > 88 pct  assist with All ADL, pt with dementia, frail and weak, centenarian   Pall care eval for MOLST  as per pt son - pt is DNR DNI  pulm nodule 1.2 cm on ct chest, no acute intervention at present  will follow  prognosis guarded
f/u blood cx  no hypoxia, reviewed CT scan no clear consolidation to report pneumonia  aspiration is definitely possible in this woman  but no aspiration pna on CT scan  pursue strict aspiration precautions    ua is not impressive  abd benign  procalcitonin low    can cont zosyn empirically until blood cx back  if neg will d/c antibx

## 2018-07-15 NOTE — PROGRESS NOTE ADULT - SUBJECTIVE AND OBJECTIVE BOX
[facility] -- Edie Aguirre, Azam, Russell, Manfred Sandhu Savella  Office # [phone]      Follow Up:  AF    Subjective/Observations: Seen and examined.  Sitting up in bed NAD slightly agitated talking to the nurse wanting to speak to her son. NAD.  No signs of orthopnea or PND.        REVIEW OF SYSTEMS: All other review of systems is negative unless indicated above    PAST MEDICAL & SURGICAL HISTORY:  Anxiety  Spinal stenosis  HTN (hypertension)  Dementia  H/O left mastectomy      MEDICATIONS  (STANDING):  aspirin  chewable 81 milliGRAM(s) Oral daily  guaiFENesin    Syrup 100 milliGRAM(s) Oral every 6 hours  heparin  Injectable 5000 Unit(s) SubCutaneous every 8 hours  metoprolol tartrate 25 milliGRAM(s) Oral daily  NIFEdipine IR 10 milliGRAM(s) Oral two times a day  pantoprazole  Injectable 40 milliGRAM(s) IV Push two times a day  piperacillin/tazobactam IVPB. 3.375 Gram(s) IV Intermittent every 12 hours  tranylcypromine 10 milliGRAM(s) Oral <User Schedule>    MEDICATIONS  (PRN):  ALBUTerol/ipratropium for Nebulization 3 milliLiter(s) Nebulizer every 6 hours PRN Wheezing  ALPRAZolam 1 milliGRAM(s) Oral every 12 hours PRN agitation  ALPRAZolam 0.5 milliGRAM(s) Oral four times a day PRN anxiety  oxyCODONE    5 mG/acetaminophen 325 mG 0.5 Tablet(s) Oral every 8 hours PRN Moderate Pain (4 - 6)      Allergies    No Known Allergies    Intolerances            Vital Signs Last 24 Hrs  T(C): 36.2 (15 Jul 2018 02:40), Max: 36.3 (14 Jul 2018 10:37)  T(F): 97.1 (15 Jul 2018 02:40), Max: 97.4 (14 Jul 2018 10:37)  HR: 112 (15 Jul 2018 02:40) (78 - 135)  BP: 105/60 (15 Jul 2018 04:19) (87/64 - 114/73)  BP(mean): --  RR: 19 (15 Jul 2018 04:19) (14 - 19)  SpO2: 95% (15 Jul 2018 04:19) (95% - 100%)    I&O's Summary        PHYSICAL EXAM:  TELE: AF to 150s NSVT 24 beats overnight   Constitutional: NAD, awake and alert, frail  HEENT: Moist Mucous Membranes, Anicteric  Pulmonary: Non-labored, breath sounds with mild wheezing and scattered rhonchi bilaterally,   Cardiovascular: Irregular, S1 and S2, No murmurs, rubs, gallops or clicks  Gastrointestinal: Bowel Sounds present, soft, nontender.   Lymph: No peripheral edema. No lymphadenopathy.  Skin: No visible rashes or ulcers.  Psych:  Mood & affect slightly agitated    LABS: All Labs Reviewed:                        9.0    11.06 )-----------( 219      ( 15 Jul 2018 07:18 )             27.2                         11.0   14.85 )-----------( 261      ( 14 Jul 2018 11:38 )             32.8     15 Jul 2018 07:18    134    |  103    |  47     ----------------------------<  96     4.0     |  18     |  2.10   14 Jul 2018 11:38    134    |  100    |  50     ----------------------------<  117    4.5     |  20     |  2.30     Ca    7.7        15 Jul 2018 07:18  Ca    8.4        14 Jul 2018 11:38  Phos  2.8       15 Jul 2018 07:18  Phos  2.8       14 Jul 2018 21:57  Mg     2.1       15 Jul 2018 07:18  Mg     1.5       14 Jul 2018 21:57    TPro  5.7    /  Alb  2.8    /  TBili  0.6    /  DBili  x      /  AST  22     /  ALT  16     /  AlkPhos  84     14 Jul 2018 11:38    PT/INR - ( 14 Jul 2018 18:10 )   PT: 21.4 sec;   INR: 1.94 ratio         PTT - ( 14 Jul 2018 18:10 )  PTT:42.3 sec    < from: 12 Lead ECG (07.14.18 @ 10:58) >  Ventricular Rate 157 BPM    Atrial Rate 153 BPM    QRS Duration 106 ms    Q-T Interval 272 ms    QTC Calculation(Bezet) 439 ms    R Axis 206 degrees    T Axis -27 degrees    Diagnosis Line Poor data quality  Atrial fibrillation with rapid ventricular response with premature ventricular or aberrantly conducted complexes  Right superior axis deviation  Incomplete right bundle branch block  Possible Right ventricular hypertrophy  ST & T wave abnormality, consider anterior ischemia  Abnormal ECG  When compared with ECG of 25-FEB-2018 08:11,  Atrial fibrillation has replaced Sinus rhythm  Vent. rate has increased BY  97 BPM  Incomplete right bundle branch block has replaced Right bundle branch block  Confirmed by DANY GONZALEZ ([id number]) on 7/14/2018 5:33:48 PM    < end of copied text >    < from: CT Chest No Cont (07.14.18 @ 12:39) >  EXAM:  CT CHEST                            PROCEDURE DATE:  07/14/2018          INTERPRETATION:  Clinical information: Cough    No prior chest CT studies present for comparison    Axial images obtained, coronal and sagittal images computer reformatted.   Noncontrast study limits evaluation of hilar and mediastinal regions.    The patient is rotated towards the right.    No pericardial effusion or thoracic aortic aneurysm. No mediastinal   lesions or hilar lesions identified, limited evaluation due to lack of IV   contrast.    Small area of peripheral airspace disease right lung base. No effusion.   No vascular congestion. No pneumothorax. Minimal bullous changes right   lower lobe.    Focal spiculated airspace disease in the left upper lobemeasuring 12 mm,   could represent a scar but cannot exclude neoplasm.     Trace groundglass opacity both lung apices.    Central airway intact. Thyroid gland not enlarged.    No adrenal lesions. The spleen is not enlarged. No hydronephrotic   changes. No acute appearing osseous abnormalities.      IMPRESSION: Small area of peripheral airspace disease at the right lung   base    Spiculated density left upper lobe, scar versus neoplasm.    See additional findings as described above.                MARCO HOFFMAN M.D.,ATTENDING RADIOLOGIST  This document has been electronically signed. Jul 14 2018 12:48PM        < end of copied text >    < from: Xray Chest 1 View AP/PA (07.14.18 @ 11:09) >  EXAM:  XR CHEST AP OR PA 1V                            PROCEDURE DATE:  07/14/2018          INTERPRETATION:  HISTORY: Cough for 2 weeks  TECHNIQUE: Portable frontal view of the chest, 1 view.  COMPARISON: 2/25/2018.  FINDINGS:   Patient is rotated to the right.  HEART:  Enlarged  LUNGS: Grossly clear lungs.    The bones are osteopenic with a bell shaped thorax, compatible with   osteomalacia.        IMPRESSION:     Cardiomegaly.    Grossly clear lungs.      < end of copied text > [facility] -- Edie Aguirre, Azam, Russell, Manfred Sandhu Savella  Office # [phone]      Follow Up:  AF    Subjective/Observations: Seen and examined.  Sitting up in bed NAD slightly agitated talking to the nurse wanting to speak to her son. NAD.  No signs of orthopnea or PND.        REVIEW OF SYSTEMS: Limited 2/2 comorbidities     PAST MEDICAL & SURGICAL HISTORY:  Anxiety  Spinal stenosis  HTN (hypertension)  Dementia  H/O left mastectomy      MEDICATIONS  (STANDING):  aspirin  chewable 81 milliGRAM(s) Oral daily  guaiFENesin    Syrup 100 milliGRAM(s) Oral every 6 hours  heparin  Injectable 5000 Unit(s) SubCutaneous every 8 hours  metoprolol tartrate 25 milliGRAM(s) Oral daily  NIFEdipine IR 10 milliGRAM(s) Oral two times a day  pantoprazole  Injectable 40 milliGRAM(s) IV Push two times a day  piperacillin/tazobactam IVPB. 3.375 Gram(s) IV Intermittent every 12 hours  tranylcypromine 10 milliGRAM(s) Oral <User Schedule>    MEDICATIONS  (PRN):  ALBUTerol/ipratropium for Nebulization 3 milliLiter(s) Nebulizer every 6 hours PRN Wheezing  ALPRAZolam 1 milliGRAM(s) Oral every 12 hours PRN agitation  ALPRAZolam 0.5 milliGRAM(s) Oral four times a day PRN anxiety  oxyCODONE    5 mG/acetaminophen 325 mG 0.5 Tablet(s) Oral every 8 hours PRN Moderate Pain (4 - 6)      Allergies    No Known Allergies    Intolerances            Vital Signs Last 24 Hrs  T(C): 36.2 (15 Jul 2018 02:40), Max: 36.3 (14 Jul 2018 10:37)  T(F): 97.1 (15 Jul 2018 02:40), Max: 97.4 (14 Jul 2018 10:37)  HR: 112 (15 Jul 2018 02:40) (78 - 135)  BP: 105/60 (15 Jul 2018 04:19) (87/64 - 114/73)  BP(mean): --  RR: 19 (15 Jul 2018 04:19) (14 - 19)  SpO2: 95% (15 Jul 2018 04:19) (95% - 100%)    I&O's Summary        PHYSICAL EXAM:  TELE: AF to 150s NSVT 24 beats overnight   Constitutional: NAD, awake and alert, frail  HEENT: Moist Mucous Membranes, Anicteric  Pulmonary: Non-labored, breath sounds with mild wheezing and scattered rhonchi bilaterally,   Cardiovascular: Irregular, S1 and S2, No murmurs, rubs, gallops or clicks  Gastrointestinal: Bowel Sounds present, soft, nontender.   Lymph: No peripheral edema. No lymphadenopathy.  Skin: No visible rashes or ulcers.  Psych:  Mood & affect slightly agitated    LABS: All Labs Reviewed:                        9.0    11.06 )-----------( 219      ( 15 Jul 2018 07:18 )             27.2                         11.0   14.85 )-----------( 261      ( 14 Jul 2018 11:38 )             32.8     15 Jul 2018 07:18    134    |  103    |  47     ----------------------------<  96     4.0     |  18     |  2.10   14 Jul 2018 11:38    134    |  100    |  50     ----------------------------<  117    4.5     |  20     |  2.30     Ca    7.7        15 Jul 2018 07:18  Ca    8.4        14 Jul 2018 11:38  Phos  2.8       15 Jul 2018 07:18  Phos  2.8       14 Jul 2018 21:57  Mg     2.1       15 Jul 2018 07:18  Mg     1.5       14 Jul 2018 21:57    TPro  5.7    /  Alb  2.8    /  TBili  0.6    /  DBili  x      /  AST  22     /  ALT  16     /  AlkPhos  84     14 Jul 2018 11:38    PT/INR - ( 14 Jul 2018 18:10 )   PT: 21.4 sec;   INR: 1.94 ratio         PTT - ( 14 Jul 2018 18:10 )  PTT:42.3 sec    < from: 12 Lead ECG (07.14.18 @ 10:58) >  Ventricular Rate 157 BPM    Atrial Rate 153 BPM    QRS Duration 106 ms    Q-T Interval 272 ms    QTC Calculation(Bezet) 439 ms    R Axis 206 degrees    T Axis -27 degrees    Diagnosis Line Poor data quality  Atrial fibrillation with rapid ventricular response with premature ventricular or aberrantly conducted complexes  Right superior axis deviation  Incomplete right bundle branch block  Possible Right ventricular hypertrophy  ST & T wave abnormality, consider anterior ischemia  Abnormal ECG  When compared with ECG of 25-FEB-2018 08:11,  Atrial fibrillation has replaced Sinus rhythm  Vent. rate has increased BY  97 BPM  Incomplete right bundle branch block has replaced Right bundle branch block  Confirmed by DANY GONZALEZ ([id number]) on 7/14/2018 5:33:48 PM    < end of copied text >    < from: CT Chest No Cont (07.14.18 @ 12:39) >  EXAM:  CT CHEST                            PROCEDURE DATE:  07/14/2018          INTERPRETATION:  Clinical information: Cough    No prior chest CT studies present for comparison    Axial images obtained, coronal and sagittal images computer reformatted.   Noncontrast study limits evaluation of hilar and mediastinal regions.    The patient is rotated towards the right.    No pericardial effusion or thoracic aortic aneurysm. No mediastinal   lesions or hilar lesions identified, limited evaluation due to lack of IV   contrast.    Small area of peripheral airspace disease right lung base. No effusion.   No vascular congestion. No pneumothorax. Minimal bullous changes right   lower lobe.    Focal spiculated airspace disease in the left upper lobemeasuring 12 mm,   could represent a scar but cannot exclude neoplasm.     Trace groundglass opacity both lung apices.    Central airway intact. Thyroid gland not enlarged.    No adrenal lesions. The spleen is not enlarged. No hydronephrotic   changes. No acute appearing osseous abnormalities.      IMPRESSION: Small area of peripheral airspace disease at the right lung   base    Spiculated density left upper lobe, scar versus neoplasm.    See additional findings as described above.                MARCO HOFFMAN M.D.,ATTENDING RADIOLOGIST  This document has been electronically signed. Jul 14 2018 12:48PM        < end of copied text >    < from: Xray Chest 1 View AP/PA (07.14.18 @ 11:09) >  EXAM:  XR CHEST AP OR PA 1V                            PROCEDURE DATE:  07/14/2018          INTERPRETATION:  HISTORY: Cough for 2 weeks  TECHNIQUE: Portable frontal view of the chest, 1 view.  COMPARISON: 2/25/2018.  FINDINGS:   Patient is rotated to the right.  HEART:  Enlarged  LUNGS: Grossly clear lungs.    The bones are osteopenic with a bell shaped thorax, compatible with   osteomalacia.        IMPRESSION:     Cardiomegaly.    Grossly clear lungs.      < end of copied text >

## 2018-07-15 NOTE — CONSULT NOTE ADULT - ASSESSMENT
·	REGINALDO, CKD 3: Prerenal azotemia  ·	Anemia   ·	Hypertension   ·	Pneumonia  ·	Atrial fibrillation    Gentle IV hydration. Check repeat labs and monitor renal function trend. Encourage PO intake as tolerated.   Labs as ordered. Get renal sonogram if creatinine worsening. Avoid nephrotoxic meds as possible.   Avoid ACEI, ARB and NSAIDS. Monitor input and output. Monitor BP trend. Titrate BP meds as needed.   Rx for pneumonia. Monitor h/h trend. Transfuse PRBC's PRN. Overall prognosis poor.   Supportive care. Will follow electrolytes and renal function trend.   Further recommendations pending clinical course. Thank you for the courtesy of this referral.

## 2018-07-15 NOTE — PATIENT PROFILE ADULT. - LOCATION
bilateral sacrum bilateral heels reddened discoloration noted on vaginal area and lower abdomen, open sore noted on anus

## 2018-07-15 NOTE — PROGRESS NOTE ADULT - PROBLEM SELECTOR PLAN 2
New onset rapid afib on EKG in ED  CHADVASC score of 4  Dr. Shearer cardio spoke to son who agrees that risks of starting ac outweigh benefits. No AC for now.   start asa 81 mg   Monitor on tele  Cardio Dr Shearer consulted

## 2018-07-15 NOTE — CONSULT NOTE ADULT - SUBJECTIVE AND OBJECTIVE BOX
[facility], Division of Infectious Diseases  FARHANA Antonio A. Lee  [phone]  BRAYDEN MOY  89y, Female  [id number]    HPI:confused, nonverbal history per chart  89 yo F with PMHx of  dementia, HTN, GERD, chronic pain, anxiety w/ panic attacks presents with chest congestion.  Son states that patient has had persistent chest congestion and sputum production x few weeks despite 2-courses of antibiotics, Ceclor (for 5 days) and then Levaquin (for 7 days, last dose taken Thursday July 12, 2018)  . Son states that he was told by PCP that patient needs chest xray, however was unable to obtain before coming to ED. Son also reports patient has had poor appetite and increased confusion for the past two weeks. Per son patient has not had and fever, nausea, vomiting or diarrhea. No additional history at this time      PMH/PSH--  Anxiety  Spinal stenosis  HTN (hypertension)  Dementia  H/O left mastectomy      Allergies--NKDA      Medications--  Antibiotics: piperacillin/tazobactam IVPB. 3.375 Gram(s) IV Intermittent every 12 hours    Immunologic:   Other: ALBUTerol/ipratropium for Nebulization PRN  ALPRAZolam PRN  ALPRAZolam PRN  aspirin  chewable  guaiFENesin    Syrup  heparin  Injectable  metoprolol tartrate  oxyCODONE    5 mG/acetaminophen 325 mG PRN  pantoprazole  Injectable  sodium chloride 0.9%.  tranylcypromine      Social History--  unable to obtain  Family/Marital History--  No pertinent family history in first degree relatives    Remainder not relevant to clinical concern.    Travel/Environmental/Occupational History:  nc  Review of Systems:  unable to obtain  Review of systems otherwise negative except as previously noted.    Physical Exam--  Vital Signs: T(F): 97.1 (07-15-18 @ 02:40), Max: 97.1 (07-15-18 @ 02:40)  HR: 112 (07-15-18 @ 02:40)  BP: 105/60 (07-15-18 @ 04:19)  RR: 19 (07-15-18 @ 04:19)  SpO2: 95% (07-15-18 @ 04:19)  Wt(kg): --  General: Nontoxic-appearing Female in no acute distress.  HEENT: AT/NC.Anicteric. Conjunctiva pink and moist. Oropharynx clear no teeth  Neck: Not rigid. No sense of mass.  Nodes: None palpable.  Lungs:noncompliant with deep breath  Heart: Regular rate and rhythm. No Murmur. No rub. No gallop. No palpable thrill.  Abdomen: Bowel sounds present and normoactive. Soft. Nondistended.   Extremities: No cyanosis or clubbing. No edema.   Skin: Warm. Dry. Good turgor. No rash. No vasculitic stigmata.          Laboratory & Imaging Data--  CBC                        9.0    11.06 )-----------( 219      ( 15 Jul 2018 07:18 )             27.2       Chemistries  07-15    134<L>  |  103  |  47<H>  ----------------------------<  96  4.0   |  18<L>  |  2.10<H>    Ca    7.7<L>      15 Jul 2018 07:18  Phos  2.8     07-15  Mg     2.1     07-15    TPro  5.7<L>  /  Alb  2.8<L>  /  TBili  0.6  /  DBili  x   /  AST  22  /  ALT  16  /  AlkPhos  84  07-14      Culture Data    < from: CT Chest No Cont (07.14.18 @ 12:39) >    EXAM:  CT CHEST                            PROCEDURE DATE:  07/14/2018          INTERPRETATION:  Clinical information: Cough    No prior chest CT studies present for comparison    Axial images obtained, coronal and sagittal images computer reformatted.   Noncontrast study limits evaluation of hilar and mediastinal regions.    The patient is rotated towards the right.    No pericardial effusion or thoracic aortic aneurysm. No mediastinal   lesions or hilar lesions identified, limited evaluation due to lack of IV   contrast.    Small area of peripheral airspace disease right lung base. No effusion.   No vascular congestion. No pneumothorax. Minimal bullous changes right   lower lobe.    Focal spiculated airspace disease in the left upper lobemeasuring 12 mm,   could represent a scar but cannot exclude neoplasm.     Trace groundglass opacity both lung apices.    Central airway intact. Thyroid gland not enlarged.    No adrenal lesions. The spleen is not enlarged. No hydronephrotic   changes. No acute appearing osseous abnormalities.      IMPRESSION: Small area of peripheral airspace disease at the right lung   base    Spiculated density left upper lobe, scar versus neoplasm.    See additional findings as described above.      < from: Xray Chest 1 View AP/PA (07.14.18 @ 11:09) >  EXAM:  XR CHEST AP OR PA 1V                            PROCEDURE DATE:  07/14/2018          INTERPRETATION:  HISTORY: Cough for 2 weeks  TECHNIQUE: Portable frontal view of the chest, 1 view.  COMPARISON: 2/25/2018.  FINDINGS:   Patient is rotated to the right.  HEART:  Enlarged  LUNGS: Grossly clear lungs.    The bones are osteopenic with a bell shaped thorax, compatible with   osteomalacia.        IMPRESSION:     Cardiomegaly.    Grossly clear lungs.    < end of copied text >    Urine Microscopic-Add On (NC) (07.14.18 @ 13:08)    Red Blood Cell - Urine: 0-2 /HPF    White Blood Cell - Urine: 3-5    Epithelial Cells: Occasional

## 2018-07-15 NOTE — PROGRESS NOTE ADULT - ASSESSMENT
89yo F with PMHx of  dementia, HTN, GERD, chronic pain, anxiety w/ panic attacks presents with chest congestion likey from PNA    - Abx per primary team  - Now in AF had periods of RVR to 150s with NSVT 24 beats.  Was hypothermic and magnesium corrected.  Received Metoprolol 25mg po as well as IV 2.5mg became hypotensive and responded to NS 250cc bolus x 2 with HR on tele in the 90s presents.    - Cont tele.   - On exam has mild wheezing with scattered rhonchi noted.  Will monitor closely for overload.  Strict I&Os and daily weights  - Will cont her Metoprolol but change to 12.5mg q6h will hold her Nifedipine to give room for her BP to better control her rate.    - At her age and comorbidities, risk of AC may outweigh benefits. Her son agrees. Cont with ASA.   - Sx not from an ischemic process.   - pain\agitation control  - Monitor and replete electrolytes. Keep K>4.0 and Mg>2.0.  - Further cardiac workup will depend on clinical course.   - All other workup per primary team. Will followup.     Kavya Pascual NP-C  Cardiology 89yo F with PMHx of  dementia, HTN, GERD, chronic pain, anxiety w/ panic attacks presents with chest congestion likey from PNA    - Abx per primary team  - Now in AF had periods of RVR to 150s with NSVT 24 beats.  Was hypothermic and magnesium corrected.  Received Metoprolol 25mg po as well as IV 2.5mg became hypotensive and responded to NS 250cc bolus x 2 with HR on tele in the 90s presents.    - Cont tele.  - On exam has mild wheezing with scattered rhonchi noted.  Will monitor closely for overload.  Strict I&Os and daily weights  - Will cont her Metoprolol but change to 12.5mg q6h will hold her Nifedipine to give room for her BP to better control her rate.    - May need more maintenance fluid  - At her age and comorbidities, risk of AC may outweigh benefits. Her son agrees. Cont with ASA.   - Sx not from an ischemic process.   - pain\agitation control  - Monitor and replete electrolytes. Keep K>4.0 and Mg>2.0.  - Further cardiac workup will depend on clinical course.   - All other workup per primary team. Will followup.     Kavya Pascual, DENICE-C  Cardiology

## 2018-07-15 NOTE — PROGRESS NOTE ADULT - PROBLEM SELECTOR PLAN 8
Chronic. Continue reorient patient frequently  Patient's son states that she has been having soft diet at home. Will continue for now  Speech and swallow eval Now also hospital acquired delirium, agitation.   Xanax PRN ordered  Psych Dr. Sweeney called.  Close observation  Fall precautions.  Speech and swallow eval, aspiration precautions

## 2018-07-15 NOTE — PROGRESS NOTE ADULT - SUBJECTIVE AND OBJECTIVE BOX
Date/Time Patient Seen:  		  Referring MD:   Data Reviewed	       Patient is a 89y old  Female who presents with a chief complaint of cough, congestion (14 Jul 2018 13:52)    in bed  confused  vs and meds reviewed    Subjective/HPI     PAST MEDICAL & SURGICAL HISTORY:  Anxiety  Spinal stenosis  HTN (hypertension)  Dementia  H/O left mastectomy  No significant past surgical history        Medication list         MEDICATIONS  (STANDING):  aspirin  chewable 81 milliGRAM(s) Oral daily  guaiFENesin    Syrup 100 milliGRAM(s) Oral every 6 hours  heparin  Injectable 5000 Unit(s) SubCutaneous every 8 hours  metoprolol tartrate 25 milliGRAM(s) Oral daily  NIFEdipine IR 10 milliGRAM(s) Oral two times a day  pantoprazole  Injectable 40 milliGRAM(s) IV Push two times a day  piperacillin/tazobactam IVPB. 3.375 Gram(s) IV Intermittent every 12 hours  tranylcypromine 10 milliGRAM(s) Oral <User Schedule>    MEDICATIONS  (PRN):  ALBUTerol/ipratropium for Nebulization 3 milliLiter(s) Nebulizer every 6 hours PRN Wheezing  ALPRAZolam 0.5 milliGRAM(s) Oral four times a day PRN anxiety  oxyCODONE    5 mG/acetaminophen 325 mG 0.5 Tablet(s) Oral every 8 hours PRN Moderate Pain (4 - 6)         Vitals log        ICU Vital Signs Last 24 Hrs  T(C): 36.2 (15 Jul 2018 02:40), Max: 36.3 (14 Jul 2018 10:37)  T(F): 97.1 (15 Jul 2018 02:40), Max: 97.4 (14 Jul 2018 10:37)  HR: 112 (15 Jul 2018 02:40) (78 - 135)  BP: 105/60 (15 Jul 2018 04:19) (87/64 - 114/73)  BP(mean): --  ABP: --  ABP(mean): --  RR: 19 (15 Jul 2018 04:19) (14 - 19)  SpO2: 95% (15 Jul 2018 04:19) (95% - 100%)           Input and Output:  I&O's Detail      Lab Data                        11.0   14.85 )-----------( 261      ( 14 Jul 2018 11:38 )             32.8     07-14    134<L>  |  100  |  50<H>  ----------------------------<  117<H>  4.5   |  20<L>  |  2.30<H>    Ca    8.4<L>      14 Jul 2018 11:38  Phos  2.8     07-14  Mg     1.5     07-14    TPro  5.7<L>  /  Alb  2.8<L>  /  TBili  0.6  /  DBili  x   /  AST  22  /  ALT  16  /  AlkPhos  84  07-14            Review of Systems	      Objective     Physical Examination    heart s1s2  lung dec BS  abd soft      Pertinent Lab findings & Imaging      Yasir:  NO   Adequate UO     I&O's Detail           Discussed with:     Cultures:	        Radiology

## 2018-07-16 VITALS
DIASTOLIC BLOOD PRESSURE: 66 MMHG | HEART RATE: 98 BPM | RESPIRATION RATE: 18 BRPM | OXYGEN SATURATION: 98 % | SYSTOLIC BLOOD PRESSURE: 101 MMHG

## 2018-07-16 LAB
ANION GAP SERPL CALC-SCNC: 16 MMOL/L — SIGNIFICANT CHANGE UP (ref 5–17)
APTT BLD: 50 SEC — HIGH (ref 27.5–37.4)
BUN SERPL-MCNC: 43 MG/DL — HIGH (ref 7–23)
CALCIUM SERPL-MCNC: 7.5 MG/DL — LOW (ref 8.5–10.1)
CHLORIDE SERPL-SCNC: 105 MMOL/L — SIGNIFICANT CHANGE UP (ref 96–108)
CO2 SERPL-SCNC: 13 MMOL/L — LOW (ref 22–31)
CREAT SERPL-MCNC: 2.2 MG/DL — HIGH (ref 0.5–1.3)
CULTURE RESULTS: SIGNIFICANT CHANGE UP
GLUCOSE SERPL-MCNC: 123 MG/DL — HIGH (ref 70–99)
HCT VFR BLD CALC: 27.9 % — LOW (ref 34.5–45)
HGB BLD-MCNC: 9 G/DL — LOW (ref 11.5–15.5)
INR BLD: 2.5 RATIO — HIGH (ref 0.88–1.16)
MCHC RBC-ENTMCNC: 30.5 PG — SIGNIFICANT CHANGE UP (ref 27–34)
MCHC RBC-ENTMCNC: 32.3 GM/DL — SIGNIFICANT CHANGE UP (ref 32–36)
MCV RBC AUTO: 94.6 FL — SIGNIFICANT CHANGE UP (ref 80–100)
NRBC # BLD: 3 /100 WBCS — HIGH (ref 0–0)
PLATELET # BLD AUTO: 172 K/UL — SIGNIFICANT CHANGE UP (ref 150–400)
POTASSIUM SERPL-MCNC: 4.8 MMOL/L — SIGNIFICANT CHANGE UP (ref 3.5–5.3)
POTASSIUM SERPL-SCNC: 4.8 MMOL/L — SIGNIFICANT CHANGE UP (ref 3.5–5.3)
PROTHROM AB SERPL-ACNC: 27.8 SEC — HIGH (ref 9.8–12.7)
RAPID RVP RESULT: DETECTED
RBC # BLD: 2.95 M/UL — LOW (ref 3.8–5.2)
RBC # FLD: 16 % — HIGH (ref 10.3–14.5)
RV+EV RNA SPEC QL NAA+PROBE: DETECTED
SODIUM SERPL-SCNC: 134 MMOL/L — LOW (ref 135–145)
SPECIMEN SOURCE: SIGNIFICANT CHANGE UP
WBC # BLD: 8.63 K/UL — SIGNIFICANT CHANGE UP (ref 3.8–10.5)
WBC # FLD AUTO: 8.63 K/UL — SIGNIFICANT CHANGE UP (ref 3.8–10.5)

## 2018-07-16 PROCEDURE — 99232 SBSQ HOSP IP/OBS MODERATE 35: CPT

## 2018-07-16 PROCEDURE — 99233 SBSQ HOSP IP/OBS HIGH 50: CPT

## 2018-07-16 RX ORDER — RISPERIDONE 4 MG/1
0.25 TABLET ORAL AT BEDTIME
Qty: 0 | Refills: 0 | Status: DISCONTINUED | OUTPATIENT
Start: 2018-07-16 | End: 2018-07-16

## 2018-07-16 RX ORDER — OLANZAPINE 15 MG/1
5 TABLET, FILM COATED ORAL EVERY 8 HOURS
Qty: 0 | Refills: 0 | Status: DISCONTINUED | OUTPATIENT
Start: 2018-07-16 | End: 2018-07-16

## 2018-07-16 RX ORDER — METOPROLOL TARTRATE 50 MG
1 TABLET ORAL
Qty: 0 | Refills: 0 | COMMUNITY

## 2018-07-16 RX ORDER — QUETIAPINE FUMARATE 200 MG/1
25 TABLET, FILM COATED ORAL AT BEDTIME
Qty: 0 | Refills: 0 | Status: DISCONTINUED | OUTPATIENT
Start: 2018-07-16 | End: 2018-07-16

## 2018-07-16 RX ORDER — SODIUM CHLORIDE 9 MG/ML
1000 INJECTION INTRAMUSCULAR; INTRAVENOUS; SUBCUTANEOUS
Qty: 0 | Refills: 0 | Status: DISCONTINUED | OUTPATIENT
Start: 2018-07-16 | End: 2018-07-16

## 2018-07-16 RX ORDER — NIFEDIPINE 30 MG
0 TABLET, EXTENDED RELEASE 24 HR ORAL
Qty: 0 | Refills: 0 | COMMUNITY

## 2018-07-16 RX ORDER — PANTOPRAZOLE SODIUM 20 MG/1
1 TABLET, DELAYED RELEASE ORAL
Qty: 0 | Refills: 0 | COMMUNITY

## 2018-07-16 RX ORDER — QUETIAPINE FUMARATE 200 MG/1
1 TABLET, FILM COATED ORAL
Qty: 0 | Refills: 0 | COMMUNITY

## 2018-07-16 RX ORDER — TRANYLCYPROMINE SULFATE 10 MG/1
1 TABLET, FILM COATED ORAL
Qty: 0 | Refills: 0 | COMMUNITY

## 2018-07-16 RX ADMIN — PANTOPRAZOLE SODIUM 40 MILLIGRAM(S): 20 TABLET, DELAYED RELEASE ORAL at 06:26

## 2018-07-16 RX ADMIN — PIPERACILLIN AND TAZOBACTAM 25 GRAM(S): 4; .5 INJECTION, POWDER, LYOPHILIZED, FOR SOLUTION INTRAVENOUS at 06:25

## 2018-07-16 RX ADMIN — HEPARIN SODIUM 5000 UNIT(S): 5000 INJECTION INTRAVENOUS; SUBCUTANEOUS at 06:26

## 2018-07-16 RX ADMIN — TRANYLCYPROMINE SULFATE 10 MILLIGRAM(S): 10 TABLET, FILM COATED ORAL at 06:26

## 2018-07-16 RX ADMIN — Medication 0.5 MILLIGRAM(S): at 11:54

## 2018-07-16 RX ADMIN — Medication 12.5 MILLIGRAM(S): at 11:54

## 2018-07-16 RX ADMIN — Medication 100 MILLIGRAM(S): at 11:54

## 2018-07-16 RX ADMIN — Medication 100 MILLIGRAM(S): at 06:27

## 2018-07-16 RX ADMIN — Medication 81 MILLIGRAM(S): at 11:55

## 2018-07-16 RX ADMIN — Medication 12.5 MILLIGRAM(S): at 06:26

## 2018-07-16 NOTE — PROGRESS NOTE ADULT - SUBJECTIVE AND OBJECTIVE BOX
[facility] -- Edie Aguirre, Azam, Russell, Manfred Sandhu Savella  Office # [phone]      Follow Up:  AF    Subjective/Observations: Seen and examined this am.  Resting in bed, agitated, NAD.  RN states had several loose bowel movements overnight.      REVIEW OF SYSTEMS: All other review of systems is negative unless indicated above    PAST MEDICAL & SURGICAL HISTORY:  Anxiety  Spinal stenosis  HTN (hypertension)  Dementia  H/O left mastectomy      MEDICATIONS  (STANDING):  aspirin  chewable 81 milliGRAM(s) Oral daily  guaiFENesin    Syrup 100 milliGRAM(s) Oral every 6 hours  heparin  Injectable 5000 Unit(s) SubCutaneous every 8 hours  metoprolol tartrate 12.5 milliGRAM(s) Oral every 6 hours  pantoprazole  Injectable 40 milliGRAM(s) IV Push two times a day  risperiDONE   Tablet 0.25 milliGRAM(s) Oral at bedtime  sodium chloride 0.9%. 1000 milliLiter(s) (75 mL/Hr) IV Continuous <Continuous>  tranylcypromine 10 milliGRAM(s) Oral <User Schedule>    MEDICATIONS  (PRN):  ALBUTerol/ipratropium for Nebulization 3 milliLiter(s) Nebulizer every 6 hours PRN Wheezing  ALPRAZolam 0.5 milliGRAM(s) Oral four times a day PRN anxiety  OLANZapine Injectable 5 milliGRAM(s) IntraMuscular every 8 hours PRN Acute agitation  oxyCODONE    5 mG/acetaminophen 325 mG 0.5 Tablet(s) Oral every 8 hours PRN Moderate Pain (4 - 6)      Allergies    No Known Allergies    Intolerances            Vital Signs Last 24 Hrs  T(C): 36.2 (16 Jul 2018 05:10), Max: 36.3 (15 Jul 2018 20:35)  T(F): 97.2 (16 Jul 2018 05:10), Max: 97.3 (15 Jul 2018 20:35)  HR: 98 (16 Jul 2018 11:46) (98 - 135)  BP: 101/66 (16 Jul 2018 11:46) (95/63 - 125/82)  BP(mean): --  RR: 18 (16 Jul 2018 11:46) (17 - 19)  SpO2: 98% (16 Jul 2018 11:46) (92% - 98%)    I&O's Summary    15 Jul 2018 07:01  -  16 Jul 2018 07:00  --------------------------------------------------------  IN: 755 mL / OUT: 0 mL / NET: 755 mL          PHYSICAL EXAM:  TELE: AF   Constitutional: NAD, awake, agitated, frail  HEENT: Moist Mucous Membranes, Anicteric  Pulmonary: Non-labored, breath sounds decreased on right clear on left.  Cardiovascular: Irregular, S1 and S2, No murmurs, rubs, gallops or clicks  Gastrointestinal: Bowel Sounds present, soft, nontender.   Lymph: No peripheral edema. No lymphadenopathy.  Skin: skin is cool to touch  Psych:  Mood & affect agitated    LABS: All Labs Reviewed:                        9.0    8.63  )-----------( 172      ( 16 Jul 2018 07:32 )             27.9                         9.0    11.06 )-----------( 219      ( 15 Jul 2018 07:18 )             27.2                         11.0   14.85 )-----------( 261      ( 14 Jul 2018 11:38 )             32.8     16 Jul 2018 07:32    134    |  105    |  43     ----------------------------<  123    4.8     |  13     |  2.20   15 Jul 2018 07:18    134    |  103    |  47     ----------------------------<  96     4.0     |  18     |  2.10   14 Jul 2018 11:38    134    |  100    |  50     ----------------------------<  117    4.5     |  20     |  2.30     Ca    7.5        16 Jul 2018 07:32  Ca    7.7        15 Jul 2018 07:18  Ca    8.4        14 Jul 2018 11:38  Phos  2.8       15 Jul 2018 07:18  Phos  2.8       14 Jul 2018 21:57  Mg     2.1       15 Jul 2018 07:18  Mg     1.5       14 Jul 2018 21:57    TPro  5.7    /  Alb  2.8    /  TBili  0.6    /  DBili  x      /  AST  22     /  ALT  16     /  AlkPhos  84     14 Jul 2018 11:38    PT/INR - ( 16 Jul 2018 07:32 )   PT: 27.8 sec;   INR: 2.50 ratio         PTT - ( 16 Jul 2018 07:32 )  PTT:50.0 sec

## 2018-07-16 NOTE — PROGRESS NOTE ADULT - PROBLEM SELECTOR PLAN 4
Likely 2/2 dehydration and PO intake   Continue IVF   F/u repeat BMP in am
Likely 2/2 dehydration and PO intake   Encourage PO intake  Continue IVF   F/u repeat BMP in am

## 2018-07-16 NOTE — BEHAVIORAL HEALTH ASSESSMENT NOTE - HPI (INCLUDE ILLNESS QUALITY, SEVERITY, DURATION, TIMING, CONTEXT, MODIFYING FACTORS, ASSOCIATED SIGNS AND SYMPTOMS)
89 yo F with PMHx of  dementia, HTN, GERD, chronic pain, anxiety w/ panic attacks presents with chest congestion. Patient's son, health care proxy Daron at bedside. Unable to obtain hx from patient due to mental status. Son states that patient has had persistent chest congestion and sputum production x few weeks despite 2-courses of antibiotics, Ceclor (for 5 days) and then Levaquin (for 7 days, last dose taken Thursday July 12, 2018). Son states that he was told by PCP that patient needs chest xray, however was unable to obtain before coming to ED. Son also reports patient has had poor appetite and increased confusion for the past two weeks. Per son patient has not had and fever, nausea, vomiting or diarrhea.   Patient is a 89  y/o WWF, with no prior psychiatric hospitalizations, who was brought to the hospital by her son, who is no longer able to care for self. Patient at this time is in control, but she is very hard of hearing and is unable to answer the most basic questions.

## 2018-07-16 NOTE — PROGRESS NOTE ADULT - ASSESSMENT
89yo F with PMHx of  dementia, HTN, GERD, chronic pain, anxiety w/ panic attacks presents with chest congestion likey from PNA    - Abx per primary team  - Now in AF had periods of RVR to 150s with NSVT 24 beats.  Was hypothermic and magnesium corrected.  Received Metoprolol 25mg po as well as IV 2.5mg became hypotensive and responded to NS 250cc bolus x 2 with HR on tele in the 90s presents.    - Cont tele.  - On exam has mild wheezing with scattered rhonchi noted.  Will monitor closely for overload.  Strict I&Os and daily weights  - Will cont her Metoprolol but change to 12.5mg q6h will hold her Nifedipine to give room for her BP to better control her rate.    - May need more maintenance fluid  - At her age and comorbidities, risk of AC may outweigh benefits. Her son agrees. Cont with ASA.   - Sx not from an ischemic process.   - pain\agitation control  - Monitor and replete electrolytes. Keep K>4.0 and Mg>2.0.  - Further cardiac workup will depend on clinical course.   - All other workup per primary team. Will followup.     Kavya Pascual, DENICE-C  Cardiology

## 2018-07-16 NOTE — CHART NOTE - NSCHARTNOTEFT_GEN_A_CORE
Patient is a 89y old  Female who presents with a chief complaint of cough, congestion (16 Jul 2018 13:40)      Rapid response was called on this  89y Female patient for agonal breathing.    Patient admitted with pneumonia, REGINALDO and new onset A-fib, since admission noted to be septic, given IVF for PNA, made DNR/DNI, noted to be Rhinovirus+.    Patient was seen and examined at the bedside by the rapid response team, ICU attending Dr. Shearer and hospitalist Dr Penn at bedside.  Patient noted to be agonally breathing, no wheezes, crackles appreciated. Familys wishes were for DNR/DNI.  Son (Daron) contacted and on his way to the hospital.     Initial RR VS: /66, HR 72, SpO2 80% on 2L NC, RR 8, .    PAST MEDICAL & SURGICAL HISTORY:  Anxiety  Spinal stenosis  HTN (hypertension)  Dementia  H/O left mastectomy      MEDICATIONS  (STANDING):  aspirin  chewable 81 milliGRAM(s) Oral daily  guaiFENesin    Syrup 100 milliGRAM(s) Oral every 6 hours  heparin  Injectable 5000 Unit(s) SubCutaneous every 8 hours  metoprolol tartrate 12.5 milliGRAM(s) Oral every 6 hours  pantoprazole  Injectable 40 milliGRAM(s) IV Push two times a day  risperiDONE   Tablet 0.25 milliGRAM(s) Oral at bedtime  sodium chloride 0.9%. 1000 milliLiter(s) (75 mL/Hr) IV Continuous <Continuous>  tranylcypromine 10 milliGRAM(s) Oral <User Schedule>    MEDICATIONS  (PRN):  ALBUTerol/ipratropium for Nebulization 3 milliLiter(s) Nebulizer every 6 hours PRN Wheezing  ALPRAZolam 0.5 milliGRAM(s) Oral four times a day PRN anxiety  OLANZapine Injectable 5 milliGRAM(s) IntraMuscular every 8 hours PRN Acute agitation  oxyCODONE    5 mG/acetaminophen 325 mG 0.5 Tablet(s) Oral every 8 hours PRN Moderate Pain (4 - 6)      Allergies  No Known Allergies      Vital Signs Last 24 Hrs  T(C): 36.2 (16 Jul 2018 05:10), Max: 36.3 (15 Jul 2018 20:35)  T(F): 97.2 (16 Jul 2018 05:10), Max: 97.3 (15 Jul 2018 20:35)  HR: 98 (16 Jul 2018 11:46) (98 - 135)  BP: 101/66 (16 Jul 2018 11:46) (95/63 - 125/82)  RR: 18 (16 Jul 2018 11:46) (17 - 19)  SpO2: 98% (16 Jul 2018 11:46) (92% - 98%)    Physical Exam:  General: Well developed, non-responsive  HEENT: NCAT  Neck: Supple, nontender, no mass  Neurology: A&Ox0, nonresponsive to verbal or tactile stimuli   Respiratory: Decreased respiratory rate, agonal breathing, no wheezing or rhonchi  CV: bradycardicRRR, +S1/S2, no murmurs, rubs or gallops  Abdominal: Soft, NT, ND +BSx4  Extremities: No C/C/E, + peripheral pulses  MSK: Normal ROM, no joint erythema or warmth, no joint swelling   Skin: warm, dry, normal color, no rash or abnormal lesions    LABS:                        9.0    8.63  )-----------( 172      ( 16 Jul 2018 07:32 )             27.9     16 Jul 2018 07:32    134    |  105    |  43     ----------------------------<  123    4.8     |  13     |  2.20     Ca    7.5        16 Jul 2018 07:32      PT/INR - ( 16 Jul 2018 07:32 )   PT: 27.8 sec;   INR: 2.50 ratio         PTT - ( 16 Jul 2018 07:32 )  PTT:50.0 sec    CAPILLARY BLOOD GLUCOSE      POCT Blood Glucose.: 112 mg/dL (16 Jul 2018 13:01)        RADIOLOGY & ADDITIONAL TESTS:    Imaging Personally Reviewed:  [ ] YES  [ ] NO    End of Rapid VS:     A/P:  Tests Ordered:   Medications Given:    Attending _______ notified.   Family notified at _________.       Consultant(s) Notes Reviewed:  [ ] YES  [ ] NO    Care Discussed with Consultants/Other Providers [ ] YES  [ ] NO Patient is a 89y old  Female who presents with a chief complaint of cough, congestion (16 Jul 2018 13:40)      Rapid response was called on this  89y Female patient for agonal breathing.    Patient admitted with pneumonia, REGINALDO and new onset A-fib, since admission noted to be septic, given IVF for PNA, made DNR/DNI, noted to be Rhinovirus+.    Patient was seen and examined at the bedside by the rapid response team, ICU attending Dr. Shearer and hospitalist Dr Penn at bedside.  Patient noted to be agonally breathing, no wheezes, crackles appreciated. Familys wishes were for DNR/DNI.  Son (Daron) contacted and on his way to the hospital.     Initial RR VS: /66, HR 72, SpO2 80% on 2L NC, RR 8, .    PAST MEDICAL & SURGICAL HISTORY:  Anxiety  Spinal stenosis  HTN (hypertension)  Dementia  H/O left mastectomy      MEDICATIONS  (STANDING):  aspirin  chewable 81 milliGRAM(s) Oral daily  guaiFENesin    Syrup 100 milliGRAM(s) Oral every 6 hours  heparin  Injectable 5000 Unit(s) SubCutaneous every 8 hours  metoprolol tartrate 12.5 milliGRAM(s) Oral every 6 hours  pantoprazole  Injectable 40 milliGRAM(s) IV Push two times a day  risperiDONE   Tablet 0.25 milliGRAM(s) Oral at bedtime  sodium chloride 0.9%. 1000 milliLiter(s) (75 mL/Hr) IV Continuous <Continuous>  tranylcypromine 10 milliGRAM(s) Oral <User Schedule>    MEDICATIONS  (PRN):  ALBUTerol/ipratropium for Nebulization 3 milliLiter(s) Nebulizer every 6 hours PRN Wheezing  ALPRAZolam 0.5 milliGRAM(s) Oral four times a day PRN anxiety  OLANZapine Injectable 5 milliGRAM(s) IntraMuscular every 8 hours PRN Acute agitation  oxyCODONE    5 mG/acetaminophen 325 mG 0.5 Tablet(s) Oral every 8 hours PRN Moderate Pain (4 - 6)      Allergies  No Known Allergies      Vital Signs Last 24 Hrs  T(C): 36.2 (16 Jul 2018 05:10), Max: 36.3 (15 Jul 2018 20:35)  T(F): 97.2 (16 Jul 2018 05:10), Max: 97.3 (15 Jul 2018 20:35)  HR: 98 (16 Jul 2018 11:46) (98 - 135)  BP: 101/66 (16 Jul 2018 11:46) (95/63 - 125/82)  RR: 18 (16 Jul 2018 11:46) (17 - 19)  SpO2: 98% (16 Jul 2018 11:46) (92% - 98%)    Physical Exam:  General: lethargic, non-responsive  HEENT: NCAT  Neurology: A&Ox0, nonresponsive to verbal or tactile stimuli   Respiratory: Decreased respiratory rate, agonal breathing, no wheezing or rhonchi  CV: bradycardic, radial pulse paplpable  Abdominal: Soft, NT  Extremities: No C/C/E, + peripheral pulses  Skin: cool extremities, dry    LABS:                        9.0    8.63  )-----------( 172      ( 16 Jul 2018 07:32 )             27.9     16 Jul 2018 07:32    134    |  105    |  43     ----------------------------<  123    4.8     |  13     |  2.20     Ca    7.5        16 Jul 2018 07:32      PT/INR - ( 16 Jul 2018 07:32 )   PT: 27.8 sec;   INR: 2.50 ratio         PTT - ( 16 Jul 2018 07:32 )  PTT:50.0 sec    CAPILLARY BLOOD GLUCOSE      POCT Blood Glucose.: 112 mg/dL (16 Jul 2018 13:01)      A/P:  Discussed with family, Dr. Penn and Dr. Shearer. Patient is DNR/DNI, no further interventions.      Attending Dr. Penn notified.   Family (Daron) notified at .       Consultant(s) Notes Reviewed:  [ ] YES  [ ] NO    Care Discussed with Consultants/Other Providers [ ] YES  [ ] NO Patient is a 89y old  Female who presents with a chief complaint of cough, congestion (16 Jul 2018 13:40)      Rapid response was called on this  89y Female patient for agonal breathing.    Patient admitted with pneumonia, REGINALDO and new onset A-fib, since admission noted to be septic, given IVF for PNA, made DNR/DNI, noted to be Rhinovirus+.    Patient was seen and examined at the bedside by the rapid response team, ICU attending Dr. Shearer and hospitalist Dr Penn at bedside.  Patient noted to be agonally breathing, no wheezes, crackles appreciated. Familys wishes were for DNR/DNI.  Son (Daron) contacted and on his way to the hospital.     Initial RR VS: /66, HR 72, SpO2 80% on 2L NC, RR 8, .    PAST MEDICAL & SURGICAL HISTORY:  Anxiety  Spinal stenosis  HTN (hypertension)  Dementia  H/O left mastectomy      MEDICATIONS  (STANDING):  aspirin  chewable 81 milliGRAM(s) Oral daily  guaiFENesin    Syrup 100 milliGRAM(s) Oral every 6 hours  heparin  Injectable 5000 Unit(s) SubCutaneous every 8 hours  metoprolol tartrate 12.5 milliGRAM(s) Oral every 6 hours  pantoprazole  Injectable 40 milliGRAM(s) IV Push two times a day  risperiDONE   Tablet 0.25 milliGRAM(s) Oral at bedtime  sodium chloride 0.9%. 1000 milliLiter(s) (75 mL/Hr) IV Continuous <Continuous>  tranylcypromine 10 milliGRAM(s) Oral <User Schedule>    MEDICATIONS  (PRN):  ALBUTerol/ipratropium for Nebulization 3 milliLiter(s) Nebulizer every 6 hours PRN Wheezing  ALPRAZolam 0.5 milliGRAM(s) Oral four times a day PRN anxiety  OLANZapine Injectable 5 milliGRAM(s) IntraMuscular every 8 hours PRN Acute agitation  oxyCODONE    5 mG/acetaminophen 325 mG 0.5 Tablet(s) Oral every 8 hours PRN Moderate Pain (4 - 6)      Allergies  No Known Allergies      Vital Signs Last 24 Hrs  T(C): 36.2 (16 Jul 2018 05:10), Max: 36.3 (15 Jul 2018 20:35)  T(F): 97.2 (16 Jul 2018 05:10), Max: 97.3 (15 Jul 2018 20:35)  HR: 98 (16 Jul 2018 11:46) (98 - 135)  BP: 101/66 (16 Jul 2018 11:46) (95/63 - 125/82)  RR: 18 (16 Jul 2018 11:46) (17 - 19)  SpO2: 98% (16 Jul 2018 11:46) (92% - 98%)    Physical Exam:  General: lethargic, non-responsive  HEENT: NCAT  Neurology: A&Ox0, nonresponsive to verbal or tactile stimuli   Respiratory: Decreased respiratory rate, agonal breathing, no wheezing or rhonchi  CV: bradycardic, radial pulse paplpable  Abdominal: Soft, NT  Extremities: No C/C/E, + peripheral pulses  Skin: cool extremities, dry    LABS:                        9.0    8.63  )-----------( 172      ( 16 Jul 2018 07:32 )             27.9     16 Jul 2018 07:32    134    |  105    |  43     ----------------------------<  123    4.8     |  13     |  2.20     Ca    7.5        16 Jul 2018 07:32      PT/INR - ( 16 Jul 2018 07:32 )   PT: 27.8 sec;   INR: 2.50 ratio         PTT - ( 16 Jul 2018 07:32 )  PTT:50.0 sec    CAPILLARY BLOOD GLUCOSE      POCT Blood Glucose.: 112 mg/dL (16 Jul 2018 13:01)      A/P:  Patient actively dying. Discussed with family, Dr. Penn and Dr. Shearer. Patient is DNR/DNI, no further interventions.     Attending Dr. Penn notified.   Family (Daron) notified at .       Consultant(s) Notes Reviewed:  [ ] YES  [ ] NO    Care Discussed with Consultants/Other Providers [ ] YES  [ ] NO

## 2018-07-16 NOTE — PROGRESS NOTE ADULT - SUBJECTIVE AND OBJECTIVE BOX
Date/Time Patient Seen:  		  Referring MD:   Data Reviewed	       Patient is a 89y old  Female who presents with a chief complaint of cough, congestion (14 Jul 2018 13:52)  vs and meds reviewed        Subjective/HPI     PAST MEDICAL & SURGICAL HISTORY:  Anxiety  Spinal stenosis  HTN (hypertension)  Dementia  H/O left mastectomy  No significant past surgical history        Medication list         MEDICATIONS  (STANDING):  aspirin  chewable 81 milliGRAM(s) Oral daily  guaiFENesin    Syrup 100 milliGRAM(s) Oral every 6 hours  heparin  Injectable 5000 Unit(s) SubCutaneous every 8 hours  metoprolol tartrate 12.5 milliGRAM(s) Oral every 6 hours  pantoprazole  Injectable 40 milliGRAM(s) IV Push two times a day  piperacillin/tazobactam IVPB. 3.375 Gram(s) IV Intermittent every 12 hours  sodium chloride 0.9%. 1000 milliLiter(s) (50 mL/Hr) IV Continuous <Continuous>  tranylcypromine 10 milliGRAM(s) Oral <User Schedule>    MEDICATIONS  (PRN):  ALBUTerol/ipratropium for Nebulization 3 milliLiter(s) Nebulizer every 6 hours PRN Wheezing  ALPRAZolam 1 milliGRAM(s) Oral every 12 hours PRN agitation  ALPRAZolam 0.5 milliGRAM(s) Oral four times a day PRN anxiety  oxyCODONE    5 mG/acetaminophen 325 mG 0.5 Tablet(s) Oral every 8 hours PRN Moderate Pain (4 - 6)         Vitals log        ICU Vital Signs Last 24 Hrs  T(C): 36.2 (16 Jul 2018 05:10), Max: 36.3 (15 Jul 2018 20:35)  T(F): 97.2 (16 Jul 2018 05:10), Max: 97.3 (15 Jul 2018 20:35)  HR: 113 (16 Jul 2018 05:10) (100 - 135)  BP: 101/68 (16 Jul 2018 05:10) (95/63 - 125/82)  BP(mean): --  ABP: --  ABP(mean): --  RR: 19 (16 Jul 2018 05:10) (17 - 19)  SpO2: 92% (16 Jul 2018 05:10) (92% - 95%)           Input and Output:  I&O's Detail    15 Jul 2018 07:01  -  16 Jul 2018 06:20  --------------------------------------------------------  IN:    Oral Fluid: 75 mL    sodium chloride 0.9%.: 530 mL  Total IN: 605 mL    OUT:  Total OUT: 0 mL    Total NET: 605 mL          Lab Data                        9.0    11.06 )-----------( 219      ( 15 Jul 2018 07:18 )             27.2     07-15    134<L>  |  103  |  47<H>  ----------------------------<  96  4.0   |  18<L>  |  2.10<H>    Ca    7.7<L>      15 Jul 2018 07:18  Phos  2.8     07-15  Mg     2.1     07-15    TPro  5.7<L>  /  Alb  2.8<L>  /  TBili  0.6  /  DBili  x   /  AST  22  /  ALT  16  /  AlkPhos  84  07-14            Review of Systems	      Objective     Physical Examination    heart s1s2  lung dec BS  abd soft      Pertinent Lab findings & Imaging      Yasir:  NO   Adequate UO     I&O's Detail    15 Jul 2018 07:01  -  16 Jul 2018 06:20  --------------------------------------------------------  IN:    Oral Fluid: 75 mL    sodium chloride 0.9%.: 530 mL  Total IN: 605 mL    OUT:  Total OUT: 0 mL    Total NET: 605 mL               Discussed with:     Cultures:	        Radiology

## 2018-07-16 NOTE — GOALS OF CARE CONVERSATION - PERSONAL ADVANCE DIRECTIVE - CONVERSATION DETAILS
spoke to pt asa pina on phone, confirmed pt has molst, will provide original, son agrees to hospital consent for dnr dni. Dr Ricketts to address w Dr Penn. PC will follow

## 2018-07-16 NOTE — PROGRESS NOTE ADULT - PROBLEM SELECTOR PLAN 8
Now also hospital acquired delirium, agitation.   Xanax PRN ordered  Psych Dr. Sweeney called.  Close observation  Fall precautions.  Speech and swallow eval, aspiration precautions Now also hospital acquired delirium, agitation.   Xanax PRN. Continue seroquel at bedtime  Psych Dr. Sweeney called.  Close observation  Fall precautions.  Speech and swallow eval, aspiration precautions

## 2018-07-16 NOTE — PROGRESS NOTE ADULT - PROBLEM SELECTOR PLAN 2
New onset rapid afib on EKG in ED  CHADVASC score of 4  Dr. Shearer cardio spoke to son who agrees that risks of starting ac outweigh benefits. No AC for now.   start asa 81 mg   Monitor on tele  Cardio Dr Shearer consulted New onset rapid afib on EKG in ED  CHADVASC score of 4  Patient's son agrees that risks of starting ac outweigh benefits. No AC for now.   continue asa 81 mg   Monitor on tele  INR therapeutic   Cardio Dr Shearer

## 2018-07-16 NOTE — PROGRESS NOTE ADULT - SUBJECTIVE AND OBJECTIVE BOX
Patient is a 89y old  Female who presents with a chief complaint of cough, congestion (2018 13:52)      INTERVAL HPI/OVERNIGHT EVENTS: Patient had episode of tachycardia overnight. Patient's IVF were increased to 75 cc/hr and midnight metoprolol dose was given. Patient seen and examined at bedside this am.    MEDICATIONS  (STANDING):  aspirin  chewable 81 milliGRAM(s) Oral daily  guaiFENesin    Syrup 100 milliGRAM(s) Oral every 6 hours  heparin  Injectable 5000 Unit(s) SubCutaneous every 8 hours  metoprolol tartrate 12.5 milliGRAM(s) Oral every 6 hours  pantoprazole  Injectable 40 milliGRAM(s) IV Push two times a day  piperacillin/tazobactam IVPB. 3.375 Gram(s) IV Intermittent every 12 hours  sodium chloride 0.9%. 1000 milliLiter(s) (50 mL/Hr) IV Continuous <Continuous>  tranylcypromine 10 milliGRAM(s) Oral <User Schedule>    MEDICATIONS  (PRN):  ALBUTerol/ipratropium for Nebulization 3 milliLiter(s) Nebulizer every 6 hours PRN Wheezing  ALPRAZolam 1 milliGRAM(s) Oral every 12 hours PRN agitation  ALPRAZolam 0.5 milliGRAM(s) Oral four times a day PRN anxiety  oxyCODONE    5 mG/acetaminophen 325 mG 0.5 Tablet(s) Oral every 8 hours PRN Moderate Pain (4 - 6)      Allergies    No Known Allergies    Intolerances        REVIEW OF SYSTEMS:  Unable to obtain, patient, us confused at baseline secondary to dementia     Vital Signs Last 24 Hrs  T(C): 36.2 (2018 05:10), Max: 36.3 (15 Jul 2018 20:35)  T(F): 97.2 (2018 05:10), Max: 97.3 (15 Jul 2018 20:35)  HR: 113 (2018 05:10) (100 - 135)  BP: 101/68 (2018 05:10) (95/63 - 125/82)  BP(mean): --  RR: 19 (2018 05:10) (17 - 19)  SpO2: 92% (2018 05:10) (92% - 95%)    PHYSICAL EXAM:  GENERAL: NAD, Frail, awake  HEAD:  Atraumatic, Normocephalic  EYES: EOMI, PERRLA, conjunctiva and sclera clear  NECK: Supple, No JVD  NERVOUS SYSTEM:  Alert & Awake, frail, AAO X 1  CHEST/LUNG: Decrease  to auscultation bilaterally; No rales, rhonchi, wheezing, or rubs  HEART: S1S2+, Irregular rate and rhythm  ABDOMEN: Soft, Nontender, Nondistended; Bowel sounds present  EXTREMITIES:  2+ Peripheral Pulses, No clubbing, cyanosis, or edema, + skin tear right shin   LYMPH: No lymphadenopathy noted  SKIN: + brusing? pelvic area     LABS:                        9.0    x     )-----------( 172      ( 2018 07:32 )             27.9     CBC Full  -  ( 2018 07:32 )  WBC Count : x  Hemoglobin : 9.0 g/dL  Hematocrit : 27.9 %  Platelet Count - Automated : 172 K/uL  Mean Cell Volume : 94.6 fl  Mean Cell Hemoglobin : 30.5 pg  Mean Cell Hemoglobin Concentration : 32.3 gm/dL  Auto Neutrophil # : x  Auto Lymphocyte # : x  Auto Monocyte # : x  Auto Eosinophil # : x  Auto Basophil # : x  Auto Neutrophil % : x  Auto Lymphocyte % : x  Auto Monocyte % : x  Auto Eosinophil % : x  Auto Basophil % : x      Ca    7.7        15 Jul 2018 07:18      PT/INR - ( 2018 07:32 )   PT: 27.8 sec;   INR: 2.50 ratio         PTT - ( 2018 07:32 )  PTT:50.0 sec  Urinalysis Basic - ( 2018 13:08 )    Color: Yellow / Appearance: Clear / S.010 / pH: x  Gluc: x / Ketone: Negative  / Bili: Negative / Urobili: Negative   Blood: x / Protein: Negative / Nitrite: Negative   Leuk Esterase: Trace / RBC: 0-2 /HPF / WBC 3-5   Sq Epi: x / Non Sq Epi: Occasional / Bacteria: x      CAPILLARY BLOOD GLUCOSE            Culture - Urine (collected 18 @ 16:33)  Source: .Urine Catheterized  Preliminary Report (07-15-18 @ 21:32):    10,000 - 49,000 CFU/mL Yeast like cells    Culture - Blood (collected 18 @ 16:17)  Source: .Blood Blood-Peripheral  Preliminary Report (07-15-18 @ 17:01):    No growth to date.    Culture - Blood (collected 18 @ 16:17)  Source: .Blood Blood-Peripheral  Preliminary Report (07-15-18 @ 17:01):    No growth to date.        RADIOLOGY & ADDITIONAL TESTS:    Personally reviewed.     Consultant(s) Notes Reviewed:  [x] YES  [ ] NO Patient is a 89y old  Female who presents with a chief complaint of cough, congestion (2018 13:52)      INTERVAL HPI/OVERNIGHT EVENTS: Patient had episode of tachycardia overnight. Patient's IVF were increased to 75 cc/hr and midnight metoprolol dose was given. Patient seen and examined at bedside this am. Patient appears confused and does not respond appropriately to questioning     MEDICATIONS  (STANDING):  aspirin  chewable 81 milliGRAM(s) Oral daily  guaiFENesin    Syrup 100 milliGRAM(s) Oral every 6 hours  heparin  Injectable 5000 Unit(s) SubCutaneous every 8 hours  metoprolol tartrate 12.5 milliGRAM(s) Oral every 6 hours  pantoprazole  Injectable 40 milliGRAM(s) IV Push two times a day  piperacillin/tazobactam IVPB. 3.375 Gram(s) IV Intermittent every 12 hours  sodium chloride 0.9%. 1000 milliLiter(s) (50 mL/Hr) IV Continuous <Continuous>  tranylcypromine 10 milliGRAM(s) Oral <User Schedule>    MEDICATIONS  (PRN):  ALBUTerol/ipratropium for Nebulization 3 milliLiter(s) Nebulizer every 6 hours PRN Wheezing  ALPRAZolam 1 milliGRAM(s) Oral every 12 hours PRN agitation  ALPRAZolam 0.5 milliGRAM(s) Oral four times a day PRN anxiety  oxyCODONE    5 mG/acetaminophen 325 mG 0.5 Tablet(s) Oral every 8 hours PRN Moderate Pain (4 - 6)      Allergies    No Known Allergies    Intolerances        REVIEW OF SYSTEMS:  Unable to obtain, patient, us confused at baseline secondary to dementia     Vital Signs Last 24 Hrs  T(C): 36.2 (2018 05:10), Max: 36.3 (15 Jul 2018 20:35)  T(F): 97.2 (2018 05:10), Max: 97.3 (15 Jul 2018 20:35)  HR: 113 (2018 05:10) (100 - 135)  BP: 101/68 (2018 05:10) (95/63 - 125/82)  BP(mean): --  RR: 19 (2018 05:10) (17 - 19)  SpO2: 92% (2018 05:10) (92% - 95%)    PHYSICAL EXAM:  GENERAL: NAD, Frail, awake  HEAD:  Atraumatic, Normocephalic  EYES: EOMI, PERRLA, conjunctiva and sclera clear  NERVOUS SYSTEM:  Alert & Awake, frail, however unable to assess orientation 2/2 dementia   CHEST/LUNG:coarse breath sounds bilaterally, rhonchi heard in upper lung fields, no crackles   HEART: S1S2+, Irregular rate and rhythm  ABDOMEN: Soft, Nontender, Nondistended; Bowel sounds present  EXTREMITIES:  2+ Peripheral Pulses, No clubbing, cyanosis, or edema, + skin tear right shin   SKIN: + brusing? pelvic area     LABS:                        9.0    x     )-----------( 172      ( 2018 07:32 )             27.9     CBC Full  -  ( 2018 07:32 )  WBC Count : x  Hemoglobin : 9.0 g/dL  Hematocrit : 27.9 %  Platelet Count - Automated : 172 K/uL  Mean Cell Volume : 94.6 fl  Mean Cell Hemoglobin : 30.5 pg  Mean Cell Hemoglobin Concentration : 32.3 gm/dL  Auto Neutrophil # : x  Auto Lymphocyte # : x  Auto Monocyte # : x  Auto Eosinophil # : x  Auto Basophil # : x  Auto Neutrophil % : x  Auto Lymphocyte % : x  Auto Monocyte % : x  Auto Eosinophil % : x  Auto Basophil % : x      Ca    7.7        15 Jul 2018 07:18      PT/INR - ( 2018 07:32 )   PT: 27.8 sec;   INR: 2.50 ratio         PTT - ( 2018 07:32 )  PTT:50.0 sec  Urinalysis Basic - ( 2018 13:08 )    Color: Yellow / Appearance: Clear / S.010 / pH: x  Gluc: x / Ketone: Negative  / Bili: Negative / Urobili: Negative   Blood: x / Protein: Negative / Nitrite: Negative   Leuk Esterase: Trace / RBC: 0-2 /HPF / WBC 3-5   Sq Epi: x / Non Sq Epi: Occasional / Bacteria: x      CAPILLARY BLOOD GLUCOSE            Culture - Urine (collected 18 @ 16:33)  Source: .Urine Catheterized  Preliminary Report (07-15-18 @ 21:32):    10,000 - 49,000 CFU/mL Yeast like cells    Culture - Blood (collected 18 @ 16:17)  Source: .Blood Blood-Peripheral  Preliminary Report (07-15-18 @ 17:01):    No growth to date.    Culture - Blood (collected 18 @ 16:17)  Source: .Blood Blood-Peripheral  Preliminary Report (07-15-18 @ 17:01):    No growth to date.        RADIOLOGY & ADDITIONAL TESTS:    Personally reviewed.     Consultant(s) Notes Reviewed:  [x] YES  [ ] NO Patient is a 89y old  Female who presents with a chief complaint of cough, congestion (2018 13:52)      INTERVAL HPI/OVERNIGHT EVENTS: Patient had episode of tachycardia overnight. Patient's IVF were increased to 75 cc/hr and midnight metoprolol dose was given. Patient seen and examined at bedside this am. Patient appears confused and does not respond appropriately to questioning . As per nurse at bedside, patient had diarrhea overnight.     MEDICATIONS  (STANDING):  aspirin  chewable 81 milliGRAM(s) Oral daily  guaiFENesin    Syrup 100 milliGRAM(s) Oral every 6 hours  heparin  Injectable 5000 Unit(s) SubCutaneous every 8 hours  metoprolol tartrate 12.5 milliGRAM(s) Oral every 6 hours  pantoprazole  Injectable 40 milliGRAM(s) IV Push two times a day  piperacillin/tazobactam IVPB. 3.375 Gram(s) IV Intermittent every 12 hours  sodium chloride 0.9%. 1000 milliLiter(s) (50 mL/Hr) IV Continuous <Continuous>  tranylcypromine 10 milliGRAM(s) Oral <User Schedule>    MEDICATIONS  (PRN):  ALBUTerol/ipratropium for Nebulization 3 milliLiter(s) Nebulizer every 6 hours PRN Wheezing  ALPRAZolam 1 milliGRAM(s) Oral every 12 hours PRN agitation  ALPRAZolam 0.5 milliGRAM(s) Oral four times a day PRN anxiety  oxyCODONE    5 mG/acetaminophen 325 mG 0.5 Tablet(s) Oral every 8 hours PRN Moderate Pain (4 - 6)      Allergies    No Known Allergies    Intolerances        REVIEW OF SYSTEMS:  Unable to obtain, patient, us confused at baseline secondary to dementia     Vital Signs Last 24 Hrs  T(C): 36.2 (2018 05:10), Max: 36.3 (15 Jul 2018 20:35)  T(F): 97.2 (2018 05:10), Max: 97.3 (15 Jul 2018 20:35)  HR: 113 (2018 05:10) (100 - 135)  BP: 101/68 (2018 05:10) (95/63 - 125/82)  BP(mean): --  RR: 19 (2018 05:10) (17 - 19)  SpO2: 92% (2018 05:10) (92% - 95%)    PHYSICAL EXAM:  GENERAL: NAD, Frail, awake  HEAD:  Atraumatic, Normocephalic  EYES: EOMI, PERRLA, conjunctiva and sclera clear  NERVOUS SYSTEM:  Alert & Awake, frail, however unable to assess orientation 2/2 dementia   CHEST/LUNG:coarse breath sounds bilaterally, rhonchi heard in upper lung fields, no crackles   HEART: S1S2+, Irregular rate and rhythm  ABDOMEN: Soft, Nontender, Nondistended; Bowel sounds present  EXTREMITIES:  2+ Peripheral Pulses, No clubbing, cyanosis, or edema, + skin tear right shin   SKIN: + brusing? pelvic area     LABS:                        9.0    x     )-----------( 172      ( 2018 07:32 )             27.9     CBC Full  -  ( 2018 07:32 )  WBC Count : x  Hemoglobin : 9.0 g/dL  Hematocrit : 27.9 %  Platelet Count - Automated : 172 K/uL  Mean Cell Volume : 94.6 fl  Mean Cell Hemoglobin : 30.5 pg  Mean Cell Hemoglobin Concentration : 32.3 gm/dL  Auto Neutrophil # : x  Auto Lymphocyte # : x  Auto Monocyte # : x  Auto Eosinophil # : x  Auto Basophil # : x  Auto Neutrophil % : x  Auto Lymphocyte % : x  Auto Monocyte % : x  Auto Eosinophil % : x  Auto Basophil % : x      Ca    7.7        15 Jul 2018 07:18      PT/INR - ( 2018 07:32 )   PT: 27.8 sec;   INR: 2.50 ratio         PTT - ( 2018 07:32 )  PTT:50.0 sec  Urinalysis Basic - ( 2018 13:08 )    Color: Yellow / Appearance: Clear / S.010 / pH: x  Gluc: x / Ketone: Negative  / Bili: Negative / Urobili: Negative   Blood: x / Protein: Negative / Nitrite: Negative   Leuk Esterase: Trace / RBC: 0-2 /HPF / WBC 3-5   Sq Epi: x / Non Sq Epi: Occasional / Bacteria: x      CAPILLARY BLOOD GLUCOSE            Culture - Urine (collected 18 @ 16:33)  Source: .Urine Catheterized  Preliminary Report (07-15-18 @ 21:32):    10,000 - 49,000 CFU/mL Yeast like cells    Culture - Blood (collected 18 @ 16:17)  Source: .Blood Blood-Peripheral  Preliminary Report (07-15-18 @ 17:01):    No growth to date.    Culture - Blood (collected 18 @ 16:17)  Source: .Blood Blood-Peripheral  Preliminary Report (07-15-18 @ 17:01):    No growth to date.        RADIOLOGY & ADDITIONAL TESTS:    Personally reviewed.     Consultant(s) Notes Reviewed:  [x] YES  [ ] NO

## 2018-07-16 NOTE — PROGRESS NOTE ADULT - PROBLEM SELECTOR PLAN 9
Continue therapeutic interchange of home dose vicodin with percocet
Continue therapeutic interchange of home dose vicodin with percocet

## 2018-07-16 NOTE — PROGRESS NOTE ADULT - SUBJECTIVE AND OBJECTIVE BOX
[facility], Division of Infectious Diseases  FARHANA Antonio A. Lee  [phone]  Name: BRAYDEN MOY  Age: 89y  Gender: Female  MRN: [id number]    Interval History--  Notes reviewed  "im just laying here"    Per nurse, pt not eating at all.  no cough, no dyspnea, not hypoxic    Past Medical History--  Anxiety  Spinal stenosis  HTN (hypertension)  Dementia  H/O left mastectomy  No significant past surgical history      For details regarding the patient's social history, family history, and other miscellaneous elements, please refer the initial infectious diseases consultation and/or the admitting history and physical examination for this admission.    Allergies    No Known Allergies    Intolerances        Medications--  Antibiotics:  piperacillin/tazobactam IVPB. 3.375 Gram(s) IV Intermittent every 12 hours    Immunologic:    Other:  ALBUTerol/ipratropium for Nebulization PRN  ALPRAZolam PRN  aspirin  chewable  guaiFENesin    Syrup  heparin  Injectable  metoprolol tartrate  OLANZapine Injectable PRN  oxyCODONE    5 mG/acetaminophen 325 mG PRN  pantoprazole  Injectable  risperiDONE   Tablet  sodium chloride 0.9%.  tranylcypromine      Review of Systems--  A 10-point review of systems was obtained.     unable to obtain  Review of systems otherwise negative except as previously noted.    Physical Examination--  Vital Signs: T(F): 97.2 (07-16-18 @ 05:10), Max: 97.3 (07-15-18 @ 20:35)  HR: 98 (07-16-18 @ 11:46)  BP: 101/66 (07-16-18 @ 11:46)  RR: 18 (07-16-18 @ 11:46)  SpO2: 98% (07-16-18 @ 11:46)  Wt(kg): --  General: Nontoxic-appearing Female in no acute distress.  HEENT: AT/NC. . Anicteric. Conjunctiva pink and moist.   Neck: Not rigid. No sense of mass.  Nodes: None palpable.  Lungs: Clear bilaterally without rales, wheezing or rhonchi  Heart: Regular rate and rhythm. No Murmur. No rub. No gallop. No palpable thrill.  Abdomen: Bowel sounds present and normoactive. Soft. Nondistended. Nontender  Extremities: No cyanosis or clubbing. No edema.   Skin: Warm. Dry. Good turgor. No rash. No vasculitic stigmata.  Psychiatric: agitated        Laboratory Studies--  CBC                        9.0    8.63  )-----------( 172      ( 16 Jul 2018 07:32 )             27.9       Chemistries  07-16    134<L>  |  105  |  43<H>  ----------------------------<  123<H>  4.8   |  13<L>  |  2.20<H>    Ca    7.5<L>      16 Jul 2018 07:32  Phos  2.8     07-15  Mg     2.1     07-15        Culture Data    Culture - Urine (collected 14 Jul 2018 16:33)  Source: .Urine Catheterized  Preliminary Report (15 Jul 2018 21:32):    10,000 - 49,000 CFU/mL Yeast like cells    Culture - Blood (collected 14 Jul 2018 16:17)  Source: .Blood Blood-Peripheral  Preliminary Report (15 Jul 2018 17:01):    No growth to date.    Culture - Blood (collected 14 Jul 2018 16:17)  Source: .Blood Blood-Peripheral  Preliminary Report (15 Jul 2018 17:01):    No growth to date.        < from: CT Chest No Cont (07.14.18 @ 12:39) >    EXAM:  CT CHEST                            PROCEDURE DATE:  07/14/2018          INTERPRETATION:  Clinical information: Cough    No prior chest CT studies present for comparison    Axial images obtained, coronal and sagittal images computer reformatted.   Noncontrast study limits evaluation of hilar and mediastinal regions.    The patient is rotated towards the right.    No pericardial effusion or thoracic aortic aneurysm. No mediastinal   lesions or hilar lesions identified, limited evaluation due to lack of IV   contrast.    Small area of peripheral airspace disease right lung base. No effusion.   No vascular congestion. No pneumothorax. Minimal bullous changes right   lower lobe.    Focal spiculated airspace disease in the left upper lobemeasuring 12 mm,   could represent a scar but cannot exclude neoplasm.     Trace groundglass opacity both lung apices.    Central airway intact. Thyroid gland not enlarged.    No adrenal lesions. The spleen is not enlarged. No hydronephrotic   changes. No acute appearing osseous abnormalities.      IMPRESSION: Small area of peripheral airspace disease at the right lung   base    Spiculated density left upper lobe, scar versus neoplasm.    See additional findings as described above.      < end of copied text >    Rapid Respiratory Viral Panel (07.16.18 @ 10:38)    Rapid RVP Result: Detected: The [employer] RVP Rapid uses polymerase chain reaction (PCR) and melt  curve analysis to screen for adenovirus; coronavirus HKU1, NL63, 229E,  OC43; human metapneumovirus (hMPV); human enterovirus/rhinovirus  (Entero/RV); influenza A; influenza A/H1;influenza A/H3; influenza  A/H1-2009; influenza B; parainfluenza viruses 1, 2, 3, 4; respiratory  syncytial virus; Bordetella pertussis; Mycoplasma pneumoniae; and  Chlamydophila pneumoniae.    Entero/Rhinovirus (RapRVP): Detected

## 2018-07-16 NOTE — BEHAVIORAL HEALTH ASSESSMENT NOTE - SUMMARY
89 y/o WWF, with history of depression and dementia, who is experiencing confusion in context of pneumonia

## 2018-07-16 NOTE — SWALLOW BEDSIDE ASSESSMENT ADULT - PHARYNGEAL PHASE
Delayed pharyngeal swallow Multiple swallows/Throat clear post oral intake/Delayed pharyngeal swallow

## 2018-07-16 NOTE — PROGRESS NOTE ADULT - PROBLEM SELECTOR PLAN 10
DVT ppx: heparin subq DVT ppx: heparin subq  Problem 11: bruise in pelvic area- as per nurse at bedside, was present at admission and likely 2/2 female urinal and elevated INR causing increase bruising. Continue to monitor DVT ppx: heparin subq  Problem 11: bruise in pelvic area- as per nurse at bedside, was present at admission and likely 2/2 female urinal and elevated INR causing increase bruising. Continue to monitor  Problem 12: diarrhea- If patient has 3 watery diarrhea episode, c diff to be ordered.

## 2018-07-16 NOTE — PROGRESS NOTE ADULT - PROBLEM SELECTOR PLAN 1
Sepsis 2/2 to CAP vs aspiration pneumonia  Failed outpatient therapy with cecor and levaquin. Will continue zosyn as per ID recs. Antibiotics can be dc'd if blood cx negative   CT chest no cont Small area of peripheral airspace disease at the right lung   base. Spiculated density left upper lobe, scar versus neoplasm.   Leukocytosis likely 2/2 pna  Lactic acidosis likely 2/2 infection. Now s/p NS bolus x 2. F/u repeat lactate at 17:15  Duonebs prn  Robitussin for cough  F/u blood cx  F/u sputum cx, legionella antigen   Pulm Dr Messer Sepsis 2/2 to CAP vs aspiration pneumonia  Failed outpatient therapy with cecor and levaquin. Will continue zosyn as per ID recs. Antibiotics can be dc'd if blood cx negative   CT chest no cont Small area of peripheral airspace disease at the right lung   base. Spiculated density left upper lobe, scar versus neoplasm.   Leukocytosis resolved   Lactic acidosis likely 2/2 infection.  Duonebs prn  Robitussin for cough  F/u blood cx  F/u sputum cx, legionella antigen   Pulm Dr Messer

## 2018-07-16 NOTE — PROGRESS NOTE ADULT - PROBLEM SELECTOR PLAN 1
pulm congestion, dementia, dysphagia, ckd and reginaldo  oral and skin care  assist with ADL  spoke with son, he is leaning toward DNR  cont emp ABX  on IVF for CKD and REGINALDO  am labs pending  cough rx regimen  NEBS prn  monitor for volume overload, there is some degree of HF in this patient  aspiration risk very high  will follow  prognosis poor, advanced age and comorbidities

## 2018-07-16 NOTE — PROGRESS NOTE ADULT - PROBLEM SELECTOR PLAN 5
Possible neoplasm noted in CT chest   Patient's son denies hx of neoplasm   Pulm Dr Messer consulted
Possible neoplasm noted in CT chest   Patient's son denies hx of neoplasm   Pulm Dr Messer consulted

## 2018-07-16 NOTE — PROGRESS NOTE ADULT - PROBLEM SELECTOR PROBLEM 1
Pneumonia of right lower lobe due to infectious organism
R/O Sepsis
Pneumonia of right lower lobe due to infectious organism
Pneumonia of right lower lobe due to infectious organism
R/O Sepsis

## 2018-07-16 NOTE — GOALS OF CARE CONVERSATION - PERSONAL ADVANCE DIRECTIVE - NS PRO AD PATIENT TYPE ON CHART
copy of molst from 2/26/18/Do Not Resuscitate (DNR)/Medical Orders for Life-Sustaining Treatment (MOLST)

## 2018-07-16 NOTE — DISCHARGE NOTE ADULT - PATIENT PORTAL LINK FT
You can access the [facility] Patient Portal, offered by [facility], by registering with the following website: [email or web address]

## 2018-07-16 NOTE — PROGRESS NOTE ADULT - PROBLEM SELECTOR PLAN 3
REGINALDO on CKD, likely 2/2 volume depletion in setting of sepsis  Now s/p NS bolus x 1. To receive another NS bolus   F/u repeat BMP in am  avoid nephrotoxic agents  Dose vanc daily
REGINALDO on CKD, likely 2/2 volume depletion   Continue IVF. Monitor for fluid overload   F/u renal ultrasound  F/u repeat BMP in am  avoid nephrotoxic agents

## 2018-07-16 NOTE — SWALLOW BEDSIDE ASSESSMENT ADULT - COMMENTS
Pt is 89 y/o female, brought to hospital for decreased appetite, confusion and chest congestion. pt is alert/agitated and confused. CT chest + for PNA. Pt's family reports pt requires soft food at home Pt exhibits delayed/incomplete bolus formation given soft/hard solids. Delayed pharyngeal swallow across consistencies presented. No overt s/s aspiration. Recommend puree/thin liquids. Discussed w/RN and MD

## 2018-07-16 NOTE — PROGRESS NOTE ADULT - ASSESSMENT
89f with ckd 3, dementia, htn  admitted with chest congestion  found to have tachycardia, leukocytosis and laura on ckd  sepsis v sirs  hospital course complicated by new onset Atrial fib  + rhinovirus

## 2018-07-16 NOTE — PROGRESS NOTE ADULT - PROBLEM SELECTOR PLAN 7
Chronic, however currently hypotensive. Receiving 2nd liter bolus of NS   Continue home dose metoprolol and procardia with hold parameters
Chronic.  Continue metoprolol 12.5 mg q6 with hold parameters  Hold nifedipine for now due to episodes of hypotension

## 2018-07-16 NOTE — CHART NOTE - NSCHARTNOTEFT_GEN_A_CORE
Expiration Note- PGY1    Called by RN because patient appears to no longer be breathing.   Assessed patient at bedside.  Patient found to be unresponsive to verbal, physical and painful stimuli. No pulses palpable at radial carotid or femoral arteries. No heart or lung sounds heard on auscultation. No corneal reflex noted.     Time of death: 13:29  Attending Dr. Penn notified.   Family; Son Daron notified and present at bedside.

## 2018-07-17 LAB — LEGIONELLA AB SER-ACNC: NEGATIVE — SIGNIFICANT CHANGE UP

## 2018-09-06 NOTE — BEHAVIORAL HEALTH ASSESSMENT NOTE - DESCRIPTION
appt is made for today 9/6/2018 at [facility] office HTN, Back pain

## 2018-10-24 PROCEDURE — 87581 M.PNEUMON DNA AMP PROBE: CPT

## 2018-10-24 PROCEDURE — 36415 COLL VENOUS BLD VENIPUNCTURE: CPT

## 2018-10-24 PROCEDURE — 96367 TX/PROPH/DG ADDL SEQ IV INF: CPT

## 2018-10-24 PROCEDURE — 94640 AIRWAY INHALATION TREATMENT: CPT

## 2018-10-24 PROCEDURE — 83880 ASSAY OF NATRIURETIC PEPTIDE: CPT

## 2018-10-24 PROCEDURE — 85730 THROMBOPLASTIN TIME PARTIAL: CPT

## 2018-10-24 PROCEDURE — 82962 GLUCOSE BLOOD TEST: CPT

## 2018-10-24 PROCEDURE — 85610 PROTHROMBIN TIME: CPT

## 2018-10-24 PROCEDURE — 83605 ASSAY OF LACTIC ACID: CPT

## 2018-10-24 PROCEDURE — 99221 1ST HOSP IP/OBS SF/LOW 40: CPT

## 2018-10-24 PROCEDURE — 87040 BLOOD CULTURE FOR BACTERIA: CPT

## 2018-10-24 PROCEDURE — 71045 X-RAY EXAM CHEST 1 VIEW: CPT

## 2018-10-24 PROCEDURE — 96365 THER/PROPH/DIAG IV INF INIT: CPT

## 2018-10-24 PROCEDURE — 94760 N-INVAS EAR/PLS OXIMETRY 1: CPT

## 2018-10-24 PROCEDURE — 84100 ASSAY OF PHOSPHORUS: CPT

## 2018-10-24 PROCEDURE — 87086 URINE CULTURE/COLONY COUNT: CPT

## 2018-10-24 PROCEDURE — 83735 ASSAY OF MAGNESIUM: CPT

## 2018-10-24 PROCEDURE — 87486 CHLMYD PNEUM DNA AMP PROBE: CPT

## 2018-10-24 PROCEDURE — 80202 ASSAY OF VANCOMYCIN: CPT

## 2018-10-24 PROCEDURE — 71250 CT THORAX DX C-: CPT

## 2018-10-24 PROCEDURE — 80053 COMPREHEN METABOLIC PANEL: CPT

## 2018-10-24 PROCEDURE — 87798 DETECT AGENT NOS DNA AMP: CPT

## 2018-10-24 PROCEDURE — 86713 LEGIONELLA ANTIBODY: CPT

## 2018-10-24 PROCEDURE — 81001 URINALYSIS AUTO W/SCOPE: CPT

## 2018-10-24 PROCEDURE — 93005 ELECTROCARDIOGRAM TRACING: CPT

## 2018-10-24 PROCEDURE — 80048 BASIC METABOLIC PNL TOTAL CA: CPT

## 2018-10-24 PROCEDURE — 84145 PROCALCITONIN (PCT): CPT

## 2018-10-24 PROCEDURE — 87633 RESP VIRUS 12-25 TARGETS: CPT

## 2018-10-24 PROCEDURE — 85027 COMPLETE CBC AUTOMATED: CPT

## 2018-10-24 PROCEDURE — 99285 EMERGENCY DEPT VISIT HI MDM: CPT | Mod: 25

## 2019-05-02 NOTE — ED ADULT NURSE NOTE - HARM RISK FACTORS
[FreeTextEntry1] : Denies Chest Pain, SOB, Dizziness, Leg edema, Orthopnea, PND, Palpitations, Syncope, ARAYA, Diaphoresis.\par Echo ordered to assess LV function/progression of SWMA/possible valvular heart disease.\par Patient denies change in appetite, fatigue, heat or cold intolerance, myalgias, polydypsia, polyphagia, polyuria, tingling, numbness.\par The patient denies restlessness, fatigue, difficulty concentrating, irritability, muscle tension, sleep disturbance, insomnia, sad mood, low energy, excessive worrying, phobia, social anxiety, obsessions.\par 
yes

## 2019-07-23 NOTE — H&P ADULT - PROBLEM SELECTOR PLAN 3
Is This A New Presentation, Or A Follow-Up?: Skin Lesion What Type Of Note Output Would You Prefer (Optional)?: Bullet Format How Severe Is Your Skin Lesion?: moderate Has Your Skin Lesion Been Treated?: not been treated Hx of dementia  -fall precautions  -continue Parnate 10mg   -SW consult for ELIAS REGINALDO 2/2 likely dehydration. BUN/Cr elevated  - Patient given 1L NS bolus in ED.   - monitor AM labs REGINALDO 2/2 likely dehydration. BUN/Cr elevated  - Patient given 1L NS bolus in ED.   - monitor AM labs but family requests labs not drawn daily as patient will be better if not "poked" daily

## 2020-03-24 NOTE — PATIENT PROFILE ADULT. - MEDICATIONS BROUGHT TO HOSPITAL, PROFILE
no Note Text (......Xxx Chief Complaint.): This diagnosis correlates with the Detail Level: Zone Other (Free Text): Recommend TCA and Microdermabrasion

## 2020-07-17 NOTE — BEHAVIORAL HEALTH ASSESSMENT NOTE - DOMICILED WITH
----- Message from Renetta Morrow sent at 7/17/2020 10:30 AM CDT -----  Type: Needs Medical Advice  Who Called:  patient  Pharmacy name and phone #:   [facility] - [place], [place] - [address]  [address] [place] [address]  Phone: [phone] Fax: [phone]  Best Call Back Number: [phone]  Additional Information: pt called Monday for a refill on her norco 10mg. She states that she called the pharmacy and was told nothing has been sent. Please send refill to pharmacy. Please give call back. thanks       Family

## 2020-08-18 NOTE — PROGRESS NOTE ADULT - PROBLEM SELECTOR PLAN 6
DOS  8/19/20  10:15/8:45  [facility]- case created  Information given to pt  9093-done  Pre op: done  Post op:  8/31 3:15 w/ [facility]  Pt notified to stop aspirin /blood thinners 5-7 days prior    Pt was told nothing to eat or drink after midnight  Someone will need to drive them home after surgery     Per Samra  Schedule:  Closed reduction & pinning L 4th & 5th metacarpal  , poss ORIIF  Cpt:  26615 x 2  45 min  General   Severely demented at baseline, unable to ascertain precise pain scale.   - continue pain management with percocet prn Severely demented at baseline, unable to ascertain precise pain scale.   - pain management with percocet prn No

## 2021-05-30 NOTE — PHYSICAL THERAPY INITIAL EVALUATION ADULT - ADDITIONAL COMMENTS
Patient information on fall and injury prevention
Information obtained from pt's son via phone secondary to pt is a poor historian. Pt lives in a house with son, no steps. Pt ambulates without device and son assisted with ADLs

## 2021-07-08 NOTE — PROVIDER CONTACT NOTE (CRITICAL VALUE NOTIFICATION) - NAME OF MD/NP/PA/DO NOTIFIED:
Jay CHEST PAIN - Discharge Care           Chest Pain    WHAT YOU NEED TO KNOW:    Chest pain can be caused by a range of conditions, from not serious to life-threatening. Chest pain can be a symptom of a digestive problem, such as acid reflux or a stomach ulcer. An anxiety attack or a strong emotion, such as anger, can also cause chest pain. Infection, inflammation, or a fracture in the bones or cartilage in your chest can cause pain or discomfort. Sometimes chest pain or pressure is caused by poor blood flow to your heart (angina). Chest pain may also be caused by life-threatening conditions such as a heart attack or blood clot in your lungs.    DISCHARGE INSTRUCTIONS:    Call your local emergency number (911 in the ) or have someone call if:   •You have any of the following signs of a heart attack: ?Squeezing, pressure, or pain in your chest      ?You may also have any of the following: ?Discomfort or pain in your back, neck, jaw, stomach, or arm      ?Shortness of breath      ?Nausea or vomiting      ?Lightheadedness or a sudden cold sweat            Seek care immediately if:   •You have chest discomfort that gets worse, even with medicine.      •You cough or vomit blood.      •Your bowel movements are black or bloody.      •You cannot stop vomiting, or it hurts to swallow.      Call your doctor if:   •You have questions or concerns about your condition or care.          Medicines:   •Medicines may be given to treat the cause of your chest pain. Examples include pain medicine, anxiety medicine, or medicines to increase blood flow to your heart.      •Do not take certain medicines without asking your healthcare provider first. These include NSAIDs, herbal or vitamin supplements, or hormones (estrogen or progestin).      •Take your medicine as directed. Contact your healthcare provider if you think your medicine is not helping or if you have side effects. Tell him or her if you are allergic to any medicine. Keep a list of the medicines, vitamins, and herbs you take. Include the amounts, and when and why you take them. Bring the list or the pill bottles to follow-up visits. Carry your medicine list with you in case of an emergency.      Healthy living tips: The following are general healthy guidelines. If the cause of your chest pain is known, your healthcare provider will give you specific guidelines to follow.  •Do not smoke. Nicotine and other chemicals in cigarettes and cigars can cause lung and heart damage. Ask your healthcare provider for information if you currently smoke and need help to quit. E-cigarettes or smokeless tobacco still contain nicotine. Talk to your healthcare provider before you use these products.      •Choose a variety of healthy foods as often as possible. Include fresh, frozen, or canned fruits and vegetables. Also include low-fat dairy products, fish, chicken (without skin), and lean meats. Your healthcare provider or a dietitian can help you create meal plans. You may need to avoid certain foods or drinks if your pain is caused by a digestion problem.  Healthy Foods           •Lower your sodium (salt) intake. Limit foods that are high in sodium, such as canned foods, salty snacks, and cold cuts. If you add salt when you cook food, do not add more at the table. Choose low-sodium canned foods as much as possible.             •Drink plenty of water every day. Water helps your body to control temperature and blood pressure. Ask your healthcare provider how much water you should drink every day.      •Ask about activity. Your healthcare provider will tell you which activities to limit or avoid. Ask when you can drive, return to work, and have sex. Ask about the best exercise plan for you.      •Maintain a healthy weight. Ask your healthcare provider what a healthy weight is for you. Ask him or her to help you create a safe weight loss plan if you are overweight.      •Ask about vaccines you may need. Get the influenza (flu) vaccine every year as soon as recommended, usually in September or October. You may also need a pneumococcal vaccine to prevent pneumonia. The vaccine is usually given every 5 years, starting at age 65. Your healthcare provider can tell you if should get other vaccines, and when to get them.      Follow up with your healthcare provider within 72 hours, or as directed: You may need to return for more tests to find the cause of your chest pain. You may be referred to a specialist, such as a cardiologist or gastroenterologist. Write down your questions so you remember to ask them during your visits.

## 2021-08-09 NOTE — PROGRESS NOTE ADULT - PROBLEM SELECTOR PLAN 5
See scanned exercise session detailed report Uncertain cause of bruising in the area   - X-ray's reviewed - negative for acute fx or concerns Uncertain cause of bruising in the area   - X-ray of foot and ankle negative for acute fx or concerns

## 2021-09-03 NOTE — H&P ADULT - NSHPROSALLOTHERNEGRD_GEN_ALL_CORE
Nubia from [place] [address] called regarding patient. They need an order for right breast ultrasound and axilla. Please call [phone].   (Breast Pain and Family History of Breast Cancer) All other review of systems negative, except as noted in HPI

## 2022-05-13 NOTE — ED ADULT NURSE REASSESSMENT NOTE - SKIN TURGOR
PDMP checked. Order authorized.     Orders Placed This Encounter   • amphetamine-dextroamphetamine (ADDERALL XR) 30 MG 24 hr capsule     Sig: Take 1 capsule by mouth daily. Indications: Attention Deficit Hyperactivity Disorder     Dispense:  30 capsule     Refill:  0     Order Specific Question:   Delegates - In compliance with state law, the Prescription Drug Monitoring Program must be reviewed prior to signing any order for a controlled substance.     Answer:   PDMP Reviewed by Delegate - No Unexpected Activity   • amphetamine-dextroamphetamine (ADDERALL) 10 MG tablet     Sig: Take 1 tablet by mouth daily.     Dispense:  30 tablet     Refill:  0     Order Specific Question:   Delegates - In compliance with state law, the Prescription Drug Monitoring Program must be reviewed prior to signing any order for a controlled substance.     Answer:   PDMP Reviewed by Delegate - No Unexpected Activity         resilient/elastic

## 2022-12-14 NOTE — ED ADULT TRIAGE NOTE - LOCATION:
"Physical Therapy Treatment    Patient Name:  Marianela Shrestha   MRN:  [id number]  Admit Date: 11/18/2022  Admitting Diagnosis: SHAZIA (cauda equina syndrome)  Recent Surgeries:     General Precautions: Standard, fall  Orthopedic Precautions: spinal precautions  Braces: AFO    Recommendations:     Discharge Recommendations: home health PT  Level of Assistance Recommended at Discharge: Intermittent assistance   Discharge Equipment Recommendations: bedside commode, bath bench, wheelchair, walker, rolling  Barriers to discharge: Decreased caregiver support    Assessment:     Marianela Shrestha is a 38 y.o. female admitted with a medical diagnosis of SHAZIA (cauda equina syndrome) . Pt tolerated well, demo good effort despite subj c/o pain, however does require mod vcs throughout session for inc safety awareness, pt moves quickly/impulsive, pt would continue to benefit from skilled PT services to improve overall functional mobility, strength and endurance.  .      Performance deficits affecting function: weakness, impaired endurance, impaired functional mobility, gait instability, impaired balance, decreased lower extremity function, decreased safety awareness, pain, decreased ROM, orthopedic precautions.    Rehab Potential is good    Activity Tolerance: Fair    Plan:     Patient to be seen 6 x/week to address the above listed problems via gait training, therapeutic activities, therapeutic exercises, neuromuscular re-education, wheelchair management/training    Plan of Care Expires: 12/19/22  Plan of Care Reviewed with: patient    Subjective       " My foot is swollen" "I told the nurse, she knows, she looked at it" agreeable to therapy.     Pain/Comfort:  Pain Rating 1: 7/10  Location - Side 1: Left  Location - Orientation 1: generalized  Location 1: foot (and LBP)  Pain Addressed 1: Pre-medicate for activity, Reposition, Distraction, Cessation of Activity, Nurse notified  Pain Rating Post-Intervention 1: " 7/10    Patient's cultural, spiritual, Christianity conflicts given the current situation:  no    Objective:       Patient found with  (in wc) upon PT entry to room.     Therapeutic Activities and Exercises: recumbent cross  x15 min L-1    Functional Mobility:  Bed mobility mod I/ I  Transfers:     Sit to Stand:  supervision and stand by assistance with rolling walker  Stand pivot SBA no AD vcs for safety WC<>nustep and WC to EOB  Gait: amb with RW CG/close SBA ~125 ft no LOB L AFO  Stairs:  asc/[place] 4 steps with BHR CGA  Wheelchair Propulsion:  ~ 100 ft with BUE mod I    AM-PAC 6 CLICK MOBILITY  20    Patient left up in chair with call button in reach and belonging sin reach .    GOALS:   Multidisciplinary Problems       Physical Therapy Goals          Problem: Physical Therapy    Goal Priority Disciplines Outcome Goal Variances Interventions   Physical Therapy Goal     PT, PT/OT Ongoing, Progressing     Description: Goals to be met by:12/19/22    Patient will increase functional independence with mobility by performing:    Sit to stand transfer with Supervision - Met 12/5  Updated goal: Sit to stand transfer with Mod I    Bed to chair transfer with Supervision using Rolling Walker - Met 12/5  Updated Goal: Bed to chair transfer with Mod I using RW    Gait  x 150 feet with Stand-by Assistance using Rolling Walker. - Met 12/13  Updated Goal: Gait x 150 feet with Supervision using RW    Ascend/descend 4 stair with bilateral Handrails Stand-by Assistance using No Assistive Device. - Met 12/13  Updated Goal: Ascend/descend 4 steps with B HR and Supervision    Ascend/Descend 4 inch curb step with Stand-by Assistance using Rolling Walker.                         Time Tracking:     PT Received On: 12/14/22  PT Start Time: 1155  PT Stop Time: 1229  PT Total Time (min): 34 min    Billable Minutes: Gait Training 10 and Therapeutic Activity 24    Treatment Type: Treatment  PT/PTA: PTA     PTA Visit Number: 1      12/14/2022   Right arm;

## 2023-11-17 NOTE — PROGRESS NOTE ADULT - PROBLEM SELECTOR PLAN 6
ambulatory
opiates are high risk medications for elderly pt's. Will hold home vicodin for now.   - Tylenol for mild, percocet for moderate pain, tramadol for severe pain.

## 2023-11-25 NOTE — PHYSICAL THERAPY INITIAL EVALUATION ADULT - GENERAL OBSERVATIONS, REHAB EVAL
SURGERY PROGRESS NOTE    ADMISSION DATE:  11/23/2023  DATE:  11/25/2023  CURRENT HOSPITAL DAY:  Hospital Day: 3  SURGEON:  Luc Mendoza MD   ATTENDING PHYSICIAN:  Kary Robertson MD  CODE STATUS:  Full Resuscitation    CHIEF COMPLAINT:    Chief Complaint   Patient presents with   • Abdominal Pain        POSTOPERATIVE DAY:  1 Day Post-Op    INTERVAL HISTORY:    Mary Meredith is a 39 year old female patient admitted with Abdominal pain, right upper quadrant [R10.11] s/p lap sri  No issues overnight  Pt has some referred R shoulder pain, discussed that this will improve with time  Tolerating clears    PLAN:   Adv to regular diet  OK to dc today if pt tolerates PO    MEDICATIONS:    The medication list was reviewed today.       OBJECTIVE:    VITAL SIGNS:    Vital Last Value 24 Hour Range   Temperature 98.6 °F (37 °C) Temp  Min: 97 °F (36.1 °C)  Max: 98.6 °F (37 °C)   Pulse 64 Pulse  Min: 54  Max: 92   Respiratory 18 Resp  Min: 15  Max: 18   Blood Pressure 110/68 BP  Min: 93/60  Max: 114/67   Pulse Oximetry 100 % SpO2  Min: 96 %  Max: 100 %     INTAKE/OUTPUT:      Intake/Output Summary (Last 24 hours) at 11/25/2023 1039  Last data filed at 11/25/2023 1020  Gross per 24 hour   Intake 3144.63 ml   Output 1650 ml   Net 1494.63 ml         PHYSICAL EXAM:    Constitutional:  The patient is alert, oriented and cooperative.   Integument:  Warm. Dry.    HENT:  Normocephalic. Atraumatic.   Cardiovascular:  Normal heart rate. Normal rhythm.   Respiratory:  Normal breath sounds. No respiratory distress.   Abdomen:  Soft, NT/ND, incisions C/D/I      Imaging:  US LIVER GALLBLADDER PANCREAS (RUQ)   Final Result      1.   Cholelithiasis. No associated gallbladder wall thickening or   pericholecystic fluid. There is an impacted stone within the gallbladder   neck.   2.   No biliary ductal dilation.               Electronically Signed by: POP EMERY M.D.    Signed on: 11/23/2023 2:26 PM    Workstation ID: [id number]           Labs:  Recent Results (from the past 24 hour(s))   Comprehensive Metabolic Panel    Collection Time: 11/25/23  5:34 AM   Result Value Ref Range    Fasting Status      Sodium 139 135 - 145 mmol/L    Potassium 4.1 3.4 - 5.1 mmol/L    Chloride 104 97 - 110 mmol/L    Carbon Dioxide 25 21 - 32 mmol/L    Anion Gap 14 7 - 19 mmol/L    Glucose 93 70 - 99 mg/dL    BUN 8 6 - 20 mg/dL    Creatinine 0.63 0.51 - 0.95 mg/dL    Glomerular Filtration Rate >90 >=60    BUN/Cr 13 7 - 25    Calcium 8.3 (L) 8.4 - 10.2 mg/dL    Bilirubin, Total 0.6 0.2 - 1.0 mg/dL    GOT/AST 79 (H) <=37 Units/L    GPT/ (H) <64 Units/L    Alkaline Phosphatase 83 45 - 117 Units/L    Albumin 3.2 (L) 3.6 - 5.1 g/dL    Protein, Total 6.1 (L) 6.4 - 8.2 g/dL    Globulin 2.9 2.0 - 4.0 g/dL    A/G Ratio 1.1 1.0 - 2.4                                  Luc Mendoza MD          Pt received in bed, private aide at bedside, NAD.

## 2024-03-05 NOTE — BEHAVIORAL HEALTH ASSESSMENT NOTE - MEDICAL RECORD REVIEWED
[facility]  Discharge Note  Short Stay    Admit Date: 3/5/2024    Discharge Date: 3/5/2024    Attending Physician: Fredi Rojas     Discharge Provider: Fredi Rojas    Diagnoses:  There are no hospital problems to display for this patient.      Discharged Condition: Good    Final Diagnoses: Lumbar radiculopathy [M54.16]    Disposition: Home or Self Care    Hospital Course: No complications, uneventful    Outcome of Hospitalization, Treatment, Procedure, or Surgery:  Patient was admitted for outpatient interventional pain management procedure. The patient tolerated the procedure well with no complications.    Follow up/Patient Instructions:  Follow up as scheduled in Pain Management office in 2-3 weeks.  Patient has received instructions and follow up date and time.    Medications:  Continue previous medications    Discharge Procedure Orders   Notify your health care provider if you experience any of the following:  temperature >100.4     Notify your health care provider if you experience any of the following:  persistent nausea and vomiting or diarrhea     Notify your health care provider if you experience any of the following:  severe uncontrolled pain     Notify your health care provider if you experience any of the following:  redness, tenderness, or signs of infection (pain, swelling, redness, odor or green/yellow discharge around incision site)     Notify your health care provider if you experience any of the following:  difficulty breathing or increased cough     Notify your health care provider if you experience any of the following:  severe persistent headache     Notify your health care provider if you experience any of the following:  worsening rash     Notify your health care provider if you experience any of the following:  persistent dizziness, light-headedness, or visual disturbances     Notify your health care provider if you experience any of the following:  increased confusion or  weakness     Activity as tolerated          Yes

## 2024-06-05 NOTE — PATIENT PROFILE ADULT. - ALCOHOL USE HISTORY SINGLE SELECT
Contact [facility] to get the patient scheduled for right deltoid muscle biopsy.  
never

## 2024-08-23 NOTE — PROGRESS NOTE ADULT - PROBLEM SELECTOR PLAN 6
Maliha Riggs is a 24 year old male presenting to the walk-in clinic today for sore throat, loss of voice, and a swollen uvula that was noticed yesterday. Pt reports 3 weeks of ear, throat, and upper respiratory symptoms. Pt using acetaminophen and ibuprofen for symptoms. Took ibuprofen before visit today.    Patient would like communication of their results via:    [employer]    Cell Phone:   Telephone Information:   Mobile [phone]     Okay to leave a message containing results? Yes  
Chronic. Continue home dose xanax and parnate
Chronic. Continue home dose xanax and parnate

## 2025-05-01 NOTE — PHYSICAL THERAPY INITIAL EVALUATION ADULT - IMPAIRED TRANSFERS: BED/CHAIR, REHAB EVAL
Orders:    MRI foot/forefoot toes left wo contrast; Future    gabapentin (Neurontin) 100 mg capsule; Take 1 capsule (100 mg total) by mouth daily at bedtime     decreased strength/decreased ROM/impaired balance/cognition